# Patient Record
Sex: MALE | Race: BLACK OR AFRICAN AMERICAN | NOT HISPANIC OR LATINO | Employment: FULL TIME | URBAN - METROPOLITAN AREA
[De-identification: names, ages, dates, MRNs, and addresses within clinical notes are randomized per-mention and may not be internally consistent; named-entity substitution may affect disease eponyms.]

---

## 2017-07-27 ENCOUNTER — RADIANT APPOINTMENT (OUTPATIENT)
Dept: GENERAL RADIOLOGY | Facility: CLINIC | Age: 37
End: 2017-07-27
Attending: FAMILY MEDICINE
Payer: COMMERCIAL

## 2017-07-27 ENCOUNTER — OFFICE VISIT (OUTPATIENT)
Dept: FAMILY MEDICINE | Facility: CLINIC | Age: 37
End: 2017-07-27
Payer: COMMERCIAL

## 2017-07-27 VITALS
OXYGEN SATURATION: 98 % | SYSTOLIC BLOOD PRESSURE: 139 MMHG | WEIGHT: 195.7 LBS | BODY MASS INDEX: 26.51 KG/M2 | HEART RATE: 73 BPM | HEIGHT: 72 IN | TEMPERATURE: 99 F | DIASTOLIC BLOOD PRESSURE: 83 MMHG

## 2017-07-27 DIAGNOSIS — S69.92XA INJURY OF HAND, LEFT, INITIAL ENCOUNTER: ICD-10-CM

## 2017-07-27 DIAGNOSIS — Z00.00 ROUTINE HISTORY AND PHYSICAL EXAMINATION OF ADULT: Primary | ICD-10-CM

## 2017-07-27 DIAGNOSIS — Z13.220 LIPID SCREENING: ICD-10-CM

## 2017-07-27 DIAGNOSIS — Z13.1 SCREENING FOR DIABETES MELLITUS: ICD-10-CM

## 2017-07-27 PROCEDURE — 36415 COLL VENOUS BLD VENIPUNCTURE: CPT | Performed by: FAMILY MEDICINE

## 2017-07-27 PROCEDURE — 80061 LIPID PANEL: CPT | Performed by: FAMILY MEDICINE

## 2017-07-27 PROCEDURE — 82947 ASSAY GLUCOSE BLOOD QUANT: CPT | Performed by: FAMILY MEDICINE

## 2017-07-27 PROCEDURE — 99213 OFFICE O/P EST LOW 20 MIN: CPT | Mod: 25 | Performed by: FAMILY MEDICINE

## 2017-07-27 PROCEDURE — 99395 PREV VISIT EST AGE 18-39: CPT | Performed by: FAMILY MEDICINE

## 2017-07-27 PROCEDURE — 73130 X-RAY EXAM OF HAND: CPT | Mod: LT

## 2017-07-27 NOTE — LETTER
Yobani Baldwin  [address]  [facility] [address]      Dear Yobani    It was nice to see you at your recent visit.  Enclosed are the lab results taken at that time.  Resulted Orders   Lipid panel reflex to direct LDL   Result Value Ref Range    Cholesterol 178 <200 mg/dL    Triglycerides 159 (H) <150 mg/dL      Comment:      Borderline high:  150-199 mg/dl   High:             200-499 mg/dl   Very high:       >499 mg/dl      HDL Cholesterol 38 (L) >39 mg/dL    LDL Cholesterol Calculated 108 (H) <100 mg/dL      Comment:      Above desirable:  100-129 mg/dl   Borderline High:  130-159 mg/dL   High:             160-189 mg/dL   Very high:       >189 mg/dl      Non HDL Cholesterol 140 (H) <130 mg/dL      Comment:      Above Desirable:  130-159 mg/dl   Borderline high:  160-189 mg/dl   High:             190-219 mg/dl   Very high:       >219 mg/dl     Glucose   Result Value Ref Range    Glucose 82 70 - 99 mg/dL       Your test results are basically the same as your previous ones, slightly high cholesterol.  Follow up as needed or in 1 year.    Sincerely,    Jaciel Wilde MD MPH

## 2017-07-27 NOTE — LETTER
[facility]  [address]  [facility] [address]  Phone: [phone]    July 27, 2017        Yobani MICKY Baldwin  [address]  [facility] [address]          To whom it may concern:    RE: Green Lakedustin Baldwin    Patient was seen and treated today at our clinic.    Please contact me for questions or concerns.      Sincerely,        Jaciel Wilde MD

## 2017-07-27 NOTE — LETTER
July 27, 2017    Yobani Baldwin  [address]  [facility] [address]      Dear Yobani Hilton is under my professional care and was seen in clinic today, July 27, 2017.    Please call my office if you have any questions or concerns.      Sincerely,        Jaciel Wilde MD

## 2017-07-27 NOTE — NURSING NOTE
"Chief Complaint   Patient presents with     Physical     /83  Pulse 73  Temp 99  F (37.2  C) (Oral)  Ht 5' 11.5\" (1.816 m)  Wt 195 lb 11.2 oz (88.8 kg)  SpO2 98%  BMI 26.91 kg/m2 Estimated body mass index is 26.91 kg/(m^2) as calculated from the following:    Height as of this encounter: 5' 11.5\" (1.816 m).    Weight as of this encounter: 195 lb 11.2 oz (88.8 kg).  BP completed using cuff size: regular       Health Maintenance due pending provider review:  PHQ9    PHQ/ACT/JUAN LUIS--Gave pt questionnare      Iraida Jones CMA  "

## 2017-07-27 NOTE — MR AVS SNAPSHOT
After Visit Summary   7/27/2017    Yobani Baldwin    MRN: [id number]           Patient Information     Date Of Birth          1980        Visit Information        Provider Department      7/27/2017 3:00 PM Jaciel Wilde MD [facility]        Today's Diagnoses     Routine history and physical examination of adult    -  1    Injury of hand, left, initial encounter        Screening for diabetes mellitus        Lipid screening          Care Instructions      Preventive Health Recommendations  Male Ages 26 - 39    Yearly exam:             See your health care provider every year in order to  o   Review health changes.   o   Discuss preventive care.    o   Review your medicines if your doctor has prescribed any.    You should be tested each year for STDs (sexually transmitted diseases), if you re at risk.     After age 35, talk to your provider about cholesterol testing. If you are at risk for heart disease, have your cholesterol tested at least every 5 years.     If you are at risk for diabetes, you should have a diabetes test (fasting glucose).  Shots: Get a flu shot each year. Get a tetanus shot every 10 years.     Nutrition:    Eat at least 5 servings of fruits and vegetables daily.     Eat whole-grain bread, whole-wheat pasta and brown rice instead of white grains and rice.     Talk to your provider about Calcium and Vitamin D.     Lifestyle    Exercise for at least 150 minutes a week (30 minutes a day, 5 days a week). This will help you control your weight and prevent disease.     Limit alcohol to one drink per day.     No smoking.     Wear sunscreen to prevent skin cancer.     See your dentist every six months for an exam and cleaning.             Follow-ups after your visit        Follow-up notes from your care team     Return in about 1 year (around 7/27/2018), or if symptoms worsen or fail to improve.      Who to contact     If you have questions or need follow up  "information about today's clinic visit or your schedule please contact [facility] directly at [phone].  Normal or non-critical lab and imaging results will be communicated to you by Nyxoahhart, letter or phone within 4 business days after the clinic has received the results. If you do not hear from us within 7 days, please contact the clinic through Nyxoahhart or phone. If you have a critical or abnormal lab result, we will notify you by phone as soon as possible.  Submit refill requests through Chatterfly or call your pharmacy and they will forward the refill request to us. Please allow 3 business days for your refill to be completed.          Additional Information About Your Visit        [employer] Information     Chatterfly lets you send messages to your doctor, view your test results, renew your prescriptions, schedule appointments and more. To sign up, go to [email or web address] . Click on \"Log in\" on the left side of the screen, which will take you to the Welcome page. Then click on \"Sign up Now\" on the right side of the page.     You will be asked to enter the access code listed below, as well as some personal information. Please follow the directions to create your username and password.     Your access code is: [email or web address]  Expires: 10/29/2017 10:31 AM     Your access code will  in 90 days. If you need help or a new code, please call your [place] clinic or [phone].        Care EveryWhere ID     This is your Care EveryWhere ID. This could be used by other organizations to access your [place] medical records  [id number]        Your Vitals Were     Pulse Temperature Height Pulse Oximetry BMI (Body Mass Index)       73 99  F (37.2  C) (Oral) 5' 11.5\" (1.816 m) 98% 26.91 kg/m2        Blood Pressure from Last 3 Encounters:   17 139/83   10/17/16 139/80   16 120/80    Weight from Last 3 Encounters:   17 195 lb 11.2 oz (88.8 kg)   10/17/16 208 lb (94.3 kg)   16 199 lb " (90.3 kg)              We Performed the Following     Glucose     Lipid panel reflex to direct LDL     OFFICE/OUTPT VISIT,PREM LYLES III        Primary Care Provider Office Phone # Fax #    Jaciel Todd Wilde [phone]       [facility] [address] [facility] [address]        Equal Access to Services     [facility]: Hadii aad ku hadasho Soomaali, waaxda luqadaha, qaybta kaalmada adeegyada, waxay idiin hayaan adeeg kharash la'aakaitlin . So [facility] [phone].    ATENCIÓN: Si habla español, tiene a watson disposición servicios gratuitos de asistencia lingüística. Rickame al [phone].    We comply with applicable federal civil rights laws and [place] laws. We do not discriminate on the basis of race, color, national origin, age, disability sex, sexual orientation or gender identity.            Thank you!     Thank you for choosing [facility]  for your care. Our goal is always to provide you with excellent care. Hearing back from our patients is one way we can continue to improve our services. Please take a few minutes to complete the written survey that you may receive in the mail after your visit with us. Thank you!             Your Updated Medication List - Protect others around you: Learn how to safely use, store and throw away your medicines at www.disposemymeds.org.          This list is accurate as of: 7/27/17 11:59 PM.  Always use your most recent med list.                   Brand Name Dispense Instructions for use Diagnosis    NO ACTIVE MEDICATIONS           sildenafil 100 MG cap/tab    VIAGRA    6 tablet    Take 0.5-1 tablets ( mg) by mouth daily as needed for erectile dysfunction Take 30 min to 4 hours before intercourse.    Drug-induced erectile dysfunction

## 2017-07-27 NOTE — PROGRESS NOTES
SUBJECTIVE:   CC: Yobani Baldwin is an 37 year old male who presents for preventative health visit.     Healthy Habits:    Do you get at least three servings of calcium containing foods daily (dairy, green leafy vegetables, etc.)? No    Amount of exercise or daily activities, outside of work: 3-4 day(s) per week    Problems taking medications regularly No    Medication side effects: No    Have you had an eye exam in the past two years? no    Do you see a dentist twice per year? yes    Do you have sleep apnea, excessive snoring or daytime drowsiness?no     additional concerns     - left hand ring finger/ palm -  Over month ago - sx sore, hard to , tender to touch    Today's PHQ-2 Score:   PHQ-2 ( 1999 Pf[employer]) 7/27/2017 12/10/2015   Q1: Little interest or pleasure in doing things 0 0   Q2: Feeling down, depressed or hopeless 0 2   PHQ-2 Score 0 2         Abuse: Current or Past(Physical, Sexual or Emotional)- No  Do you feel safe in your environment - Yes  Social History   Substance Use Topics     Smoking status: Current Some Day Smoker     Smokeless tobacco: Never Used      Comment: sociallly     Alcohol use 0.0 oz/week     0 Standard drinks or equivalent per week      Comment: socially     The patient does not drink >3 drinks per day nor >7 drinks per week.    Last PSA: No results found for: PSA    Reviewed orders with patient. Reviewed health maintenance and updated orders accordingly - Yes    Reviewed and updated as needed this visit by clinical staff  Tobacco  Allergies  Meds  Med Hx  Surg Hx  Fam Hx  Soc Hx        Reviewed and updated as needed this visit by Provider          STD screening:  History   Sexual Activity     Sexual activity: Yes     Partners: Female     no screening desired today    in addition to health maintanance patient would like to discuss the following problem:    Also having some pain in his hand.  Worse with activity  1 month of pain in his had  Hard to  things    Work  related: maybe, was using a sledge hammer for knocking down a wall    Prob pert ROS:  Neuro: no significant localized weakness, numbness, tingling.    Prob pert exam .  Normal R & L upper extremity joints for ROM symmetry & tone/ no tenderness/ no effusions/ no crepitus/ or deformities.  Except:  Hand exam - some tenderness of the metacarpals of his left hand  Neurological exam reveals normal without focal findings. DTR's, motor strength and sensation normal.    X-ray  No sign of fracture    Findings c/w soft tissue injury or bone bruise    Patient was given instruction on use of ice, rest, exercises and NSAIDs for pain and swelling.  Call or return to clinic as needed if these symptoms worsen or fail to improve as anticipated.          ROS: As per HPI.  Constitutional: no recent illness, no fevers/sweats/chills, sleep normal  Eyes: No vision changes or eye irritation  Ears/Nose/Throat: No runny nose, sore throat or ear pain  CV: no palpitations, no chest pain, no lower extremity swelling.  Resp: no shortness of breath, wheezing, or cough.  GI: no nausea/vomiting/diarrhea, normal stooling pattern, no reflux symptoms, no black or bloody stools  : no burning or urgency with urination, no blood in urine, no sores or discharge.  Skin: no changing moles or other lesions, no rash  Musculoskeletal: As noted above  Psych: no depression, no concerns about anxiety  Neuro: no new headaches, dizziness    I have reviewed and updated the patient's past medical, social and family history in the EMR. Current problems are:  Patient Active Problem List    Diagnosis Date Noted     Major depression in remission (H) 01/14/2016     Priority: Medium     Olecranon bursitis 04/23/2011     Priority: Medium     CARDIOVASCULAR SCREENING; LDL GOAL LESS THAN 160 10/31/2010     Priority: Medium     Allergic rhinitis 05/19/2005     Priority: Medium     Problem list name updated by automated process. Provider to review       Family Hx:  Family  History   Problem Relation Age of Onset     Family History Negative Father      MENTAL ILLNESS Mother      DIABETES No family hx of      Coronary Artery Disease No family hx of      Hypertension No family hx of      Hyperlipidemia No family hx of      CEREBROVASCULAR DISEASE No family hx of      Breast Cancer No family hx of      Colon Cancer No family hx of      Prostate Cancer No family hx of      Other Cancer No family hx of      Depression No family hx of      Anxiety Disorder No family hx of      Substance Abuse No family hx of      Anesthesia Reaction No family hx of      Asthma No family hx of      OSTEOPOROSIS No family hx of      Genetic Disorder No family hx of      Thyroid Disease No family hx of      Obesity No family hx of      Unknown/Adopted No family hx of      Social History:  Social History   Substance Use Topics     Smoking status: Current Some Day Smoker     Smokeless tobacco: Never Used      Comment: sociallly     Alcohol use 0.0 oz/week     0 Standard drinks or equivalent per week      Comment: socially     Social History     Social History Narrative    Social Documentation:        Balanced Diet: YES    Calcium intake: 1-2 per day    Caffeine: 1-2 per day    Exercise:  type of activity na;  na times per week    Sunscreen: No    Seatbelts:  Yes    Self Breast Exam:  na    Self Testicular Exam: No    Physical/Emotional/Sexual Abuse: No    Do you feel safe in your environment? Yes        Cholesterol screen up to date: can do today    Eye Exam up to date: No    Dental Exam up to date: just due    Pap smear up to date: Does Not Apply    Mammogram up to date: Does Not Apply    Dexa Scan up to date: Does Not Apply    Colonoscopy up to date: Does Not Apply    Immunizations up to date: Yes    Glucose screen if over 40:  na                     Allergies:   No Known Allergies   Current Medications:  Current Outpatient Prescriptions   Medication Sig Dispense Refill     sildenafil (VIAGRA) 100 MG tablet  "Take 0.5-1 tablets ( mg) by mouth daily as needed for erectile dysfunction Take 30 min to 4 hours before intercourse. 6 tablet 1     NO ACTIVE MEDICATIONS           Objective:  /83  Pulse 73  Temp 99  F (37.2  C) (Oral)  Ht 5' 11.5\" (1.816 m)  Wt 195 lb 11.2 oz (88.8 kg)  SpO2 98%  BMI 26.91 kg/m2  General Appearance: Pleasant, alert, WN/WD in no acute respiratory distress.  Head Exam: Normal. Normocephalic, atraumatic. No sinus tenderness.  Eye Exam: Normal external eye, conjunctiva, lids. DUANE.  Ear Exam: Normal auditory canals and external ears. Non-tender.  Left TM-normal. Right TM-normal.  OroPharynx Exam: Dental hygiene adequate. Normal buccal mucosa. Normal pharynx.  Neck Exam: Supple, no masses or enlarged, tender nodes.  Thyroid Exam: No nodules or enlargement or tenderness.  Chest/Respiratory Exam: Normal, comfortable, easy respirations. Chest wall normal. Lungs are clear to auscultation. No wheezing, crackles, or rhonchi.  Cardiovascular Exam: Regular rate and rhythm. No murmur, gallop, or rubs. No pedal edema.  Gastrointestinal Exam: Soft, non-tender, no masses or organomegaly.  Musculoskeletal Exam: Back is non-tender, full ROM of upper and lower extremities.  Hand exam as noted above  Skin: no rash, warm and dry.  Neurologic Exam: Nonfocal, no tremor. Normal gait.  Psychiatric Exam: Alert - appropriate, normal affect    ASSESSMENT/PLAN:    ICD-10-CM    1. Routine history and physical examination of adult Z00.00    2. Injury of hand, left, initial encounter S69.92XA XR Hand Left G/E 3 Views   3. Screening for diabetes mellitus Z13.1 Glucose   4. Lipid screening Z13.220 Lipid panel reflex to direct LDL       COUNSELING:  Reviewed preventive health counseling, as reflected in patient instructions       Regular exercise       Healthy diet/nutrition       Safe sex practices/STD prevention       Colon cancer screening       Prostate cancer screening    BP Screening:   Last 3 BP Readings:  " "  BP Readings from Last 3 Encounters:   07/27/17 139/83   10/17/16 139/80   05/27/16 120/80       The following was recommended to the patient:  Re-screen BP within a year and recommended lifestyle modifications       reports that he has been smoking.  He has never used smokeless tobacco.    Estimated body mass index is 28.61 kg/(m^2) as calculated from the following:    Height as of 10/17/16: 5' 11.5\" (1.816 m).    Weight as of 10/17/16: 208 lb (94.3 kg).         Counseling Resources:  ATP IV Guidelines  Pooled Cohorts Equation Calculator  FRAX Risk Assessment  ICSI Preventive Guidelines  Dietary Guidelines for Americans, 2010  USDA's MyPlate  ASA Prophylaxis  Lung CA Screening    Jaciel Wilde MD  [facility]  "

## 2017-07-28 LAB
CHOLEST SERPL-MCNC: 178 MG/DL
GLUCOSE SERPL-MCNC: 82 MG/DL (ref 70–99)
HDLC SERPL-MCNC: 38 MG/DL
LDLC SERPL CALC-MCNC: 108 MG/DL
NONHDLC SERPL-MCNC: 140 MG/DL
TRIGL SERPL-MCNC: 159 MG/DL

## 2017-07-28 ASSESSMENT — PATIENT HEALTH QUESTIONNAIRE - PHQ9: SUM OF ALL RESPONSES TO PHQ QUESTIONS 1-9: 3

## 2017-11-24 ENCOUNTER — OFFICE VISIT (OUTPATIENT)
Dept: INTERNAL MEDICINE | Facility: CLINIC | Age: 37
End: 2017-11-24
Payer: COMMERCIAL

## 2017-11-24 VITALS
SYSTOLIC BLOOD PRESSURE: 128 MMHG | OXYGEN SATURATION: 100 % | DIASTOLIC BLOOD PRESSURE: 64 MMHG | HEART RATE: 68 BPM | BODY MASS INDEX: 27.6 KG/M2 | TEMPERATURE: 98.1 F | WEIGHT: 200.69 LBS

## 2017-11-24 DIAGNOSIS — Z30.09 ENCOUNTER FOR VASECTOMY COUNSELING: Primary | ICD-10-CM

## 2017-11-24 PROCEDURE — 99212 OFFICE O/P EST SF 10 MIN: CPT | Performed by: INTERNAL MEDICINE

## 2017-11-24 NOTE — MR AVS SNAPSHOT
After Visit Summary   11/24/2017    Yobani Baldwin    MRN: [id number]           Patient Information     Date Of Birth          1980        Visit Information        Provider Department      11/24/2017 3:20 PM Lucas Dhaliwal MD [facility]        Today's Diagnoses     Encounter for vasectomy counseling    -  1      Care Instructions    Make an appointment at your convenience for a vasectomy.     N: [employer], [facility].   --[place] [phone]   [email or web address]  --[place] [phone]   [email or web address]  --[place] [phone]   [email or web address]    [facility]: [facility] [phone]   [email or web address]/    FM[facility]: [facility] [phone]   [email or web address]    FM[facility]: [facility] [phone]   [email or web address]              Follow-ups after your visit        Additional Services     UROLOGY ADULT REFERRAL       Your provider has referred you to:     [facility]: [employer], [employer].   --[place] [phone]   [email or web address]  --[place] [phone]   [email or web address]  --[place] [phone]   [email or web address]    FM[facility]: [facility] [phone]   [email or web address]/[email or web address]/    FMG: [facility] [phone]   [email or web address]    FMG: [facility] [phone]   [email or web address]        Please be aware that coverage of these services is subject to the terms and limitations of your health insurance plan.  Call member services at your health plan with any benefit or coverage questions.      Please bring the following with you to your appointment:    (1) Any X-Rays, CTs or MRIs which have been  "performed.  Contact the facility where they were done to arrange for  prior to your scheduled appointment.    (2) List of current medications  (3) This referral request   (4) Any documents/labs given to you for this referral                  Who to contact     If you have questions or need follow up information about today's clinic visit or your schedule please contact [facility] directly at [phone].  Normal or non-critical lab and imaging results will be communicated to you by MegaBitshart, letter or phone within 4 business days after the clinic has received the results. If you do not hear from us within 7 days, please contact the clinic through MegaBitshart or phone. If you have a critical or abnormal lab result, we will notify you by phone as soon as possible.  Submit refill requests through [employer]rix or call your pharmacy and they will forward the refill request to us. Please allow 3 business days for your refill to be completed.          Additional Information About Your Visit        [employer] Information     Sentrix lets you send messages to your doctor, view your test results, renew your prescriptions, schedule appointments and more. To sign up, go to [email or web address] . Click on \"Log in\" on the left side of the screen, which will take you to the Welcome page. Then click on \"Sign up Now\" on the right side of the page.     You will be asked to enter the access code listed below, as well as some personal information. Please follow the directions to create your username and password.     Your access code is: [email or web address]  Expires: 2018  4:13 PM     Your access code will  in 90 days. If you need help or a new code, please call your [place] clinic or [phone].        Care EveryWhere ID     This is your Care EveryWhere ID. This could be used by other organizations to access your [place] medical records  [id number]        Your Vitals Were     Pulse Temperature Pulse " Oximetry BMI (Body Mass Index)          68 98.1  F (36.7  C) (Oral) 100% 27.6 kg/m2         Blood Pressure from Last 3 Encounters:   11/24/17 128/64   07/27/17 139/83   10/17/16 139/80    Weight from Last 3 Encounters:   11/24/17 200 lb 11 oz (91 kg)   07/27/17 195 lb 11.2 oz (88.8 kg)   10/17/16 208 lb (94.3 kg)              We Performed the Following     UROLOGY ADULT REFERRAL        Primary Care Provider Office Phone # Fax #    Jaciel Todd Wilde [phone] [address] [facility] [address]        Equal Access to Services     KIKI WIGGINS : Hadii elke baileyo [place], waaxda luqadaha, qaybta kaalmada adeegyada, liam mitchell . So [facility] [phone].    ATENCIÓN: Si habla español, tiene a watson disposición servicios gratuitos de asistencia lingüística. Llame al [phone].    We comply with applicable federal civil rights laws and [place] laws. We do not discriminate on the basis of race, color, national origin, age, disability, sex, sexual orientation, or gender identity.            Thank you!     Thank you for choosing [facility]  for your care. Our goal is always to provide you with excellent care. Hearing back from our patients is one way we can continue to improve our services. Please take a few minutes to complete the written survey that you may receive in the mail after your visit with us. Thank you!             Your Updated Medication List - Protect others around you: Learn how to safely use, store and throw away your medicines at www.disposemymeds.org.          This list is accurate as of: 11/24/17  4:13 PM.  Always use your most recent med list.                   Brand Name Dispense Instructions for use Diagnosis    NO ACTIVE MEDICATIONS           sildenafil 100 MG tablet    VIAGRA    6 tablet    Take 0.5-1 tablets ( mg) by mouth daily as needed for erectile dysfunction Take 30 min to 4 hours before  intercourse.    Drug-induced erectile dysfunction

## 2017-11-24 NOTE — PROGRESS NOTES
SUBJECTIVE:   Yobani Baldwin is a 37 year old male who presents to clinic today for the following health issues:      Wants referral for vasectomy.   Denies  problems or sx.         Problem list and histories reviewed & adjusted, as indicated.  Additional history: as documented        Reviewed and updated as needed this visit by clinical staff  Tobacco  Allergies  Meds  Problems  Med Hx  Surg Hx  Fam Hx  Soc Hx        Reviewed and updated as needed this visit by Provider  Allergies  Meds  Problems           Past Medical History:  ---------------------------  History reviewed. No pertinent past medical history.    Past Surgical History:  ---------------------------  History reviewed. No pertinent surgical history.    Current Medications:  ---------------------------  Current Outpatient Prescriptions   Medication Sig Dispense Refill     sildenafil (VIAGRA) 100 MG tablet Take 0.5-1 tablets ( mg) by mouth daily as needed for erectile dysfunction Take 30 min to 4 hours before intercourse. (Patient not taking: Reported on 11/24/2017) 6 tablet 1     NO ACTIVE MEDICATIONS          Allergies:  -------------  No Known Allergies    Social History:  -------------------  Social History     Social History     Marital status:      Spouse name: N/A     Number of children: 2     Years of education: N/A     Occupational History     Not on file.     Social History Main Topics     Smoking status: Current Some Day Smoker     Smokeless tobacco: Never Used      Comment: sociallly     Alcohol use 0.0 oz/week     0 Standard drinks or equivalent per week      Comment: socially     Drug use: No     Sexual activity: Yes     Partners: Female     Other Topics Concern     Parent/Sibling W/ Cabg, Mi Or Angioplasty Before 65f 55m? No     Social History Narrative    Social Documentation:        Balanced Diet: YES    Calcium intake: 1-2 per day    Caffeine: 1-2 per day    Exercise:  type of activity na;  na times per  week    Sunscreen: No    Seatbelts:  Yes    Self Breast Exam:  na    Self Testicular Exam: No    Physical/Emotional/Sexual Abuse: No    Do you feel safe in your environment? Yes        Cholesterol screen up to date: can do today    Eye Exam up to date: No    Dental Exam up to date: just due    Pap smear up to date: Does Not Apply    Mammogram up to date: Does Not Apply    Dexa Scan up to date: Does Not Apply    Colonoscopy up to date: Does Not Apply    Immunizations up to date: Yes    Glucose screen if over 40:  na                       Family Medical History:  ------------------------------  Family History   Problem Relation Age of Onset     Family History Negative Father      MENTAL ILLNESS Mother      DIABETES No family hx of      Coronary Artery Disease No family hx of      Hypertension No family hx of      Hyperlipidemia No family hx of      CEREBROVASCULAR DISEASE No family hx of      Breast Cancer No family hx of      Colon Cancer No family hx of      Prostate Cancer No family hx of      Other Cancer No family hx of      Depression No family hx of      Anxiety Disorder No family hx of      Substance Abuse No family hx of      Anesthesia Reaction No family hx of      Asthma No family hx of      OSTEOPOROSIS No family hx of      Genetic Disorder No family hx of      Thyroid Disease No family hx of      Obesity No family hx of      Unknown/Adopted No family hx of          ROS:  REVIEW OF SYSTEMS:    RESP: negative for cough, dyspnea, wheezing, hemoptysis  CV: negative for chest pain, palpitations, PND, TELLES, orthopnea; reports no changes in their ability to perform physical activity (from cardiovascular standpoint)  GI: negative for dysphagia, N/V, pain, melena, diarrhea and constipation  NEURO: negative for numbness/tingling, paralysis, incoordination, or focal weakness     OBJECTIVE:                                                    /64  Pulse 68  Temp 98.1  F (36.7  C) (Oral)  Wt 200 lb 11 oz (91 kg)   SpO2 100%  BMI 27.6 kg/m2     Exam deffered.          ASSESSMENT/PLAN:                                                      (Z30.09) Encounter for vasectomy counseling  (primary encounter diagnosis)  Comment: wants to have vasectomy.   Refer to Urology   Answered basic questions about vaectomies.   Plan: UROLOGY ADULT REFERRAL              See Patient Instructions    ERWIN SNEED M.D., MD  [facility]

## 2018-07-20 ENCOUNTER — OFFICE VISIT (OUTPATIENT)
Dept: FAMILY MEDICINE | Facility: CLINIC | Age: 38
End: 2018-07-20
Payer: COMMERCIAL

## 2018-07-20 VITALS
TEMPERATURE: 97.5 F | HEIGHT: 72 IN | DIASTOLIC BLOOD PRESSURE: 84 MMHG | OXYGEN SATURATION: 97 % | SYSTOLIC BLOOD PRESSURE: 140 MMHG | HEART RATE: 72 BPM | BODY MASS INDEX: 27.32 KG/M2 | WEIGHT: 201.7 LBS

## 2018-07-20 DIAGNOSIS — N41.0 ACUTE PROSTATITIS: Primary | ICD-10-CM

## 2018-07-20 PROCEDURE — 99214 OFFICE O/P EST MOD 30 MIN: CPT | Performed by: FAMILY MEDICINE

## 2018-07-20 RX ORDER — CIPROFLOXACIN 500 MG/1
500 TABLET, FILM COATED ORAL 2 TIMES DAILY
Qty: 56 TABLET | Refills: 0 | Status: SHIPPED | OUTPATIENT
Start: 2018-07-20 | End: 2018-08-17

## 2018-07-20 NOTE — MR AVS SNAPSHOT
"              After Visit Summary   7/20/2018    Yobani Baldwin    MRN: [id number]           Patient Information     Date Of Birth          1980        Visit Information        Provider Department      7/20/2018 12:30 PM Jaciel Wilde MD [facility]        Today's Diagnoses     Acute prostatitis    -  1       Follow-ups after your visit        Follow-up notes from your care team     Return if symptoms worsen or fail to improve.      Who to contact     If you have questions or need follow up information about today's clinic visit or your schedule please contact [facility] directly at [phone].  Normal or non-critical lab and imaging results will be communicated to you by MyChart, letter or phone within 4 business days after the clinic has received the results. If you do not hear from us within 7 days, please contact the clinic through MyChart or phone. If you have a critical or abnormal lab result, we will notify you by phone as soon as possible.  Submit refill requests through [employer] or call your pharmacy and they will forward the refill request to us. Please allow 3 business days for your refill to be completed.          Additional Information About Your Visit        Care EveryWhere ID     This is your Care EveryWhere ID. This could be used by other organizations to access your [place] medical records  [id number]        Your Vitals Were     Pulse Temperature Height Pulse Oximetry BMI (Body Mass Index)       72 97.5  F (36.4  C) (Oral) 5' 11.5\" (1.816 m) 97% 27.74 kg/m2        Blood Pressure from Last 3 Encounters:   07/20/18 140/84   11/24/17 128/64   07/27/17 139/83    Weight from Last 3 Encounters:   07/20/18 201 lb 11.2 oz (91.5 kg)   11/24/17 200 lb 11 oz (91 kg)   07/27/17 195 lb 11.2 oz (88.8 kg)              Today, you had the following     No orders found for display         Today's Medication Changes          These changes are accurate as of 7/20/18  " 4:26 PM.  If you have any questions, ask your nurse or doctor.               Start taking these medicines.        Dose/Directions    ciprofloxacin 500 MG tablet   Commonly known as:  CIPRO   Used for:  Acute prostatitis   Started by:  Jaciel Wilde MD        Dose:  500 mg   Take 1 tablet (500 mg) by mouth 2 times daily for 28 days   Quantity:  56 tablet   Refills:  0            Where to get your medicines      These medications were sent to [facility] [place] [address]  [address] [place] [address], [facility] [address]     Phone:  [phone]     ciprofloxacin 500 MG tablet                Primary Care Provider Office Phone # Fax #    Jaciel Wilde [phone] [address] [facility] [address]        Equal Access to Services     KIKI WIGGINS AH: Hadii aad ku hadasho Soomaali, waaxda luqadaha, qaybta kaalmada adeegyada, waxay apoorva hayrtent marcelino. So [facility] [phone].    ATENCIÓN: Si habla español, tiene a watson disposición servicios gratuitos de asistencia lingüística. [facility] [phone].    We comply with applicable federal civil rights laws and [place] laws. We do not discriminate on the basis of race, color, national origin, age, disability, sex, sexual orientation, or gender identity.            Thank you!     Thank you for choosing [facility]  for your care. Our goal is always to provide you with excellent care. Hearing back from our patients is one way we can continue to improve our services. Please take a few minutes to complete the written survey that you may receive in the mail after your visit with us. Thank you!             Your Updated Medication List - Protect others around you: Learn how to safely use, store and throw away your medicines at www.disposemymeds.org.          This list is accurate as of 7/20/18  4:26 PM.  Always use your most recent med list.                   Brand Name Dispense  Instructions for use Diagnosis    ciprofloxacin 500 MG tablet    CIPRO    56 tablet    Take 1 tablet (500 mg) by mouth 2 times daily for 28 days    Acute prostatitis       NO ACTIVE MEDICATIONS           sildenafil 100 MG tablet    VIAGRA    6 tablet    Take 0.5-1 tablets ( mg) by mouth daily as needed for erectile dysfunction Take 30 min to 4 hours before intercourse.    Drug-induced erectile dysfunction

## 2018-07-20 NOTE — PROGRESS NOTES
SUBJECTIVE:   Yobani Baldwin is a 38 year old male who presents to clinic today for the following health issues:      Back Pain       Duration: x1 week - happened during intercourse         Specific cause: none    Description:   Location of pain: low back left  Character of pain: dull ache, cramping and throbbing   Pain radiation: radiating into pelvic/groin and testicles - tugging/pulling feeling   New numbness or weakness in legs, not attributed to pain:  no     Intensity: Currently 5/10, At its worst 8-9/10    History:   Pain interferes with job: No  History of back problems: no prior back problems  Any previous MRI or X-rays: None  Sees a specialist for back pain:  No  Therapies tried without relief: ibuprofen     Alleviating factors:   Improved by: has only tried ibuprofen       Precipitating factors:  Worsened by: Standing and Sitting    Functional and Psychosocial Screen (Minor STarT Back):      Not performed today    Vasectomy June 1st and he wonders if it is related      ROS: As per HPI.  Constitutional:  no fevers/sweats/chills, But overall malaise  : no burning or urgency with urination  Skin: no rash      I have reviewed and updated the patient's past medical history in the EMR. Current problems are:    Patient Active Problem List   Diagnosis     Allergic rhinitis     CARDIOVASCULAR SCREENING; LDL GOAL LESS THAN 160     Olecranon bursitis     Major depression in remission (H)     No past surgical history on file.    Social History   Substance Use Topics     Smoking status: Current Some Day Smoker     Smokeless tobacco: Never Used      Comment: sociallly     Alcohol use 0.0 oz/week     0 Standard drinks or equivalent per week      Comment: socially     Family History   Problem Relation Age of Onset     Family History Negative Father      Mental Illness Mother      Diabetes No family hx of      Coronary Artery Disease No family hx of      Hypertension No family hx of      Hyperlipidemia No family  "hx of      Cerebrovascular Disease No family hx of      Breast Cancer No family hx of      Colon Cancer No family hx of      Prostate Cancer No family hx of      Other Cancer No family hx of      Depression No family hx of      Anxiety Disorder No family hx of      Substance Abuse No family hx of      Anesthesia Reaction No family hx of      Asthma No family hx of      Osteoperosis No family hx of      Genetic Disorder No family hx of      Thyroid Disease No family hx of      Obesity No family hx of      Unknown/Adopted No family hx of          Allergies, reviewed:   No Known Allergies    Current Outpatient Prescriptions   Medication Sig Dispense Refill     ciprofloxacin (CIPRO) 500 MG tablet Take 1 tablet (500 mg) by mouth 2 times daily for 28 days 56 tablet 0     NO ACTIVE MEDICATIONS        sildenafil (VIAGRA) 100 MG tablet Take 0.5-1 tablets ( mg) by mouth daily as needed for erectile dysfunction Take 30 min to 4 hours before intercourse. (Patient not taking: Reported on 11/24/2017) 6 tablet 1       Objective:  /84  Pulse 72  Temp 97.5  F (36.4  C) (Oral)  Ht 5' 11.5\" (1.816 m)  Wt 201 lb 11.2 oz (91.5 kg)  SpO2 97%  BMI 27.74 kg/m2  General Appearance: Pleasant, alert, WN/WD in no acute respiratory distress.  Gastrointestinal Exam: Soft, non-tender, no masses or organomegaly.  Musculoskeletal Exam: Back is non-tender, full ROM of upper and lower extremities.  Skin: no rash, warm and dry.  Gyn: Penis and testicles are normal, area around the vas deferens is not significantly tender but the perineal area is tender particularly with deep palpation  Neurologic Exam: Nonfocal, no tremor. Normal gait.  Psychiatric Exam: Alert - appropriate, normal affect    ASSESSMENT/PLAN:    ICD-10-CM    1. Acute prostatitis N41.0 ciprofloxacin (CIPRO) 500 MG tablet      Based on exam patient has acute prostatitis  I do not think this is necessarily related to his recent vasectomy  Discussed the use of " fluoroquinolones  Follow-up with urology if this does not get better within a few days    He has a relationship with urologist after his vasectomy    Jaciel Wilde MD MPH

## 2018-07-20 NOTE — NURSING NOTE
"Chief Complaint   Patient presents with     Back Pain     /84  Pulse 72  Temp 97.5  F (36.4  C) (Oral)  Ht 5' 11.5\" (1.816 m)  Wt 201 lb 11.2 oz (91.5 kg)  SpO2 97%  BMI 27.74 kg/m2 Estimated body mass index is 27.74 kg/(m^2) as calculated from the following:    Height as of this encounter: 5' 11.5\" (1.816 m).    Weight as of this encounter: 201 lb 11.2 oz (91.5 kg).  Medication Reconciliation: complete      Health Maintenance that is potentially due pending provider review:  PHQ9    Completing PHQ9 today.    GEORGIA Plata  "

## 2018-07-21 ASSESSMENT — PATIENT HEALTH QUESTIONNAIRE - PHQ9: SUM OF ALL RESPONSES TO PHQ QUESTIONS 1-9: 0

## 2019-04-12 ENCOUNTER — OFFICE VISIT (OUTPATIENT)
Dept: FAMILY MEDICINE | Facility: CLINIC | Age: 39
End: 2019-04-12
Payer: COMMERCIAL

## 2019-04-12 VITALS
DIASTOLIC BLOOD PRESSURE: 84 MMHG | RESPIRATION RATE: 18 BRPM | OXYGEN SATURATION: 96 % | SYSTOLIC BLOOD PRESSURE: 134 MMHG | WEIGHT: 203.44 LBS | HEIGHT: 72 IN | HEART RATE: 84 BPM | TEMPERATURE: 98.4 F | BODY MASS INDEX: 27.56 KG/M2

## 2019-04-12 DIAGNOSIS — F32.5 MAJOR DEPRESSION IN REMISSION (H): ICD-10-CM

## 2019-04-12 DIAGNOSIS — M54.50 ACUTE RIGHT-SIDED LOW BACK PAIN WITHOUT SCIATICA: Primary | ICD-10-CM

## 2019-04-12 LAB
ALBUMIN UR-MCNC: NEGATIVE MG/DL
APPEARANCE UR: CLEAR
BILIRUB UR QL STRIP: NEGATIVE
COLOR UR AUTO: YELLOW
GLUCOSE UR STRIP-MCNC: NEGATIVE MG/DL
HGB UR QL STRIP: NEGATIVE
KETONES UR STRIP-MCNC: NEGATIVE MG/DL
LEUKOCYTE ESTERASE UR QL STRIP: NEGATIVE
NITRATE UR QL: NEGATIVE
PH UR STRIP: 6 PH (ref 5–7)
SOURCE: NORMAL
SP GR UR STRIP: 1.02 (ref 1–1.03)
UROBILINOGEN UR STRIP-ACNC: 0.2 EU/DL (ref 0.2–1)

## 2019-04-12 PROCEDURE — 99214 OFFICE O/P EST MOD 30 MIN: CPT | Performed by: FAMILY MEDICINE

## 2019-04-12 PROCEDURE — 81003 URINALYSIS AUTO W/O SCOPE: CPT | Performed by: FAMILY MEDICINE

## 2019-04-12 ASSESSMENT — ANXIETY QUESTIONNAIRES
6. BECOMING EASILY ANNOYED OR IRRITABLE: NOT AT ALL
7. FEELING AFRAID AS IF SOMETHING AWFUL MIGHT HAPPEN: NOT AT ALL
GAD7 TOTAL SCORE: 1
5. BEING SO RESTLESS THAT IT IS HARD TO SIT STILL: NOT AT ALL
IF YOU CHECKED OFF ANY PROBLEMS ON THIS QUESTIONNAIRE, HOW DIFFICULT HAVE THESE PROBLEMS MADE IT FOR YOU TO DO YOUR WORK, TAKE CARE OF THINGS AT HOME, OR GET ALONG WITH OTHER PEOPLE: NOT DIFFICULT AT ALL
2. NOT BEING ABLE TO STOP OR CONTROL WORRYING: NOT AT ALL
3. WORRYING TOO MUCH ABOUT DIFFERENT THINGS: NOT AT ALL
1. FEELING NERVOUS, ANXIOUS, OR ON EDGE: NOT AT ALL

## 2019-04-12 ASSESSMENT — PATIENT HEALTH QUESTIONNAIRE - PHQ9
5. POOR APPETITE OR OVEREATING: SEVERAL DAYS
SUM OF ALL RESPONSES TO PHQ QUESTIONS 1-9: 1

## 2019-04-12 ASSESSMENT — PAIN SCALES - GENERAL: PAINLEVEL: SEVERE PAIN (7)

## 2019-04-12 ASSESSMENT — MIFFLIN-ST. JEOR: SCORE: 1872.85
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
## 2019-04-12 NOTE — PATIENT INSTRUCTIONS
Acute Low Back Pain: Self Care at Home Instructions  Conservative Treatment    Remaining active supports a quicker recovery, keep moving. (ex. Walking, increase time & speed as tolerated).    Bed rest is not recommended. Minimize sitting. Do not remain in one position for extended periods of time.    Maintain routine activity with attention to correct posture; stop aggravating activities.    Over-the-counter pain medications for short term symptom control. (See below)    Muscle relaxants are sometimes helpful for few days, but may cause drowsiness. (See below)    Cold packs or heat based upon your preference.    Most people improve within 2 weeks; most have significant improvement within 4 weeks.    If symptoms worsen or you experience numbness, contact your provider immediately.    Medications for Pain Relief  Recommended over-the-counter non-steroidal anti-inflammatories:     Ibuprofen (Advil, Motrin) 600 mg. three times a day as needed for pain      OR   Naproxen Sodium (Aleve) 220-440 mg. two times per day as needed for pain    If you have been prescribed a muscle relaxant (*cyclobenzapine 10 mg.)  Take    - 1 tablet at bedtime  *May cause drowsiness. Do not drive when taking this medication.    Low Back Pain Exercises   Exercises that stretch and strengthen the muscles of your abdomen and spine can help prevent back problems. If your back and abdominal muscles are strong, you can maintain good posture and keep your spine in its correct position.     If your muscles are tight, take a warm shower or bath before doing the exercises. Exercise on a rug or mat. Stop doing any exercise that causes pain until you have talked with your provider.     These exercises are intended only as suggestions. Ask your provider or physical therapist to help you develop an exercise program. Check with your provider before starting the exercises. Ask your provider how many times a week you need to do the exercises.     Low Back Pain  Exercises                       Cat and camel: Get down on your hands and knees. Let your stomach sag, allowing your back to curve downward. Hold this position for 5 seconds. Then arch your back and hold for 5 seconds. Do 3 sets of 10.       Pelvic tilt: Lie on your back with your knees bent and your feet flat on the floor. Tighten your abdominal muscles and push your lower back into the floor. Hold this position for 5 seconds, then relax. Do 3 sets of 10.       Extension exercise: Lie face down on the floor for 5 minutes. If this hurts too much, lie face down with a pillow under your stomach. This should relieve your leg or back pain. When you can lie on your stomach for 5 minutes without a pillow, then you can continue with the rest of this exercise.     After lying on your stomach for 5 minutes, prop yourself up on your elbows for another 5 minutes. Lie flat again for 1 minute, then press down on your hands and extend your elbows while keeping your hips flat on the floor. Hold for 1 second and lower yourself to the floor. Repeat 10 times. Do 4 sets. Rest for 2 minutes between sets. You should have no pain in your legs when you do this, but it is normal to feel pain in your lower back. Do this several times a day.     Developed by [employer]   Published by [employer].   Last modified: 2009-02-08   Last reviewed: 2008-07-07   This content is reviewed periodically and is subject to change as new health information becomes available. The information is intended to inform and educate and is not a replacement for medical evaluation, advice, diagnosis or treatment by a healthcare professional.   Adult Health Advisor 2009.1 Index  Adult Health Advisor 2009.1 Credits     2009 [facility] and/or its affiliates. All Rights Reserved.

## 2019-04-12 NOTE — NURSING NOTE
"Chief Complaint   Patient presents with     Back Pain     /84 (BP Location: Right arm, Patient Position: Sitting, Cuff Size: Adult Regular)   Pulse 84   Temp 98.4  F (36.9  C) (Oral)   Resp 18   Ht 1.816 m (5' 11.5\")   Wt 92.3 kg (203 lb 7 oz)   SpO2 96%   BMI 27.98 kg/m   Estimated body mass index is 27.98 kg/m  as calculated from the following:    Height as of this encounter: 1.816 m (5' 11.5\").    Weight as of this encounter: 92.3 kg (203 lb 7 oz).  bp completed using cuff size: large      Health Maintenance addressed:  NONE    n/a              "

## 2019-04-13 ASSESSMENT — ANXIETY QUESTIONNAIRES: GAD7 TOTAL SCORE: 1

## 2019-07-08 NOTE — PATIENT INSTRUCTIONS
Make an appointment at your convenience for a vasectomy.     FHN: [facility]   --[place] [phone]   [email or web address]  --[place] [phone]   [email or web address]  --[place] [phone]   [email or web address]    FMG: [facility] [place] [phone]   [email or web address]    FMG: [facility] [place] [phone]   [email or web address]    FMG: [facility] - [place] [phone]   [email or web address]       no

## 2020-05-06 ENCOUNTER — TRANSFERRED RECORDS (OUTPATIENT)
Dept: HEALTH INFORMATION MANAGEMENT | Facility: CLINIC | Age: 40
End: 2020-05-06
Payer: COMMERCIAL

## 2020-10-20 ENCOUNTER — OFFICE VISIT (OUTPATIENT)
Dept: FAMILY MEDICINE | Facility: CLINIC | Age: 40
End: 2020-10-20
Payer: COMMERCIAL

## 2020-10-20 VITALS
DIASTOLIC BLOOD PRESSURE: 77 MMHG | OXYGEN SATURATION: 98 % | HEIGHT: 70 IN | TEMPERATURE: 96.7 F | HEART RATE: 77 BPM | SYSTOLIC BLOOD PRESSURE: 145 MMHG | RESPIRATION RATE: 16 BRPM | BODY MASS INDEX: 27.77 KG/M2 | WEIGHT: 194 LBS

## 2020-10-20 DIAGNOSIS — Z13.220 LIPID SCREENING: ICD-10-CM

## 2020-10-20 DIAGNOSIS — Z23 NEED FOR VACCINATION: ICD-10-CM

## 2020-10-20 DIAGNOSIS — F32.2 MAJOR DEPRESSIVE DISORDER, SINGLE EPISODE, SEVERE WITHOUT PSYCHOTIC FEATURES (H): ICD-10-CM

## 2020-10-20 DIAGNOSIS — Z13.1 DIABETES MELLITUS SCREENING: ICD-10-CM

## 2020-10-20 DIAGNOSIS — Z00.00 ROUTINE GENERAL MEDICAL EXAMINATION AT A HEALTH CARE FACILITY: Primary | ICD-10-CM

## 2020-10-20 PROCEDURE — 80061 LIPID PANEL: CPT | Performed by: FAMILY MEDICINE

## 2020-10-20 PROCEDURE — 99396 PREV VISIT EST AGE 40-64: CPT | Mod: 25 | Performed by: FAMILY MEDICINE

## 2020-10-20 PROCEDURE — 82947 ASSAY GLUCOSE BLOOD QUANT: CPT | Performed by: FAMILY MEDICINE

## 2020-10-20 PROCEDURE — 90686 IIV4 VACC NO PRSV 0.5 ML IM: CPT | Performed by: FAMILY MEDICINE

## 2020-10-20 PROCEDURE — 90471 IMMUNIZATION ADMIN: CPT | Performed by: FAMILY MEDICINE

## 2020-10-20 PROCEDURE — 90715 TDAP VACCINE 7 YRS/> IM: CPT | Performed by: FAMILY MEDICINE

## 2020-10-20 PROCEDURE — 36415 COLL VENOUS BLD VENIPUNCTURE: CPT | Performed by: FAMILY MEDICINE

## 2020-10-20 PROCEDURE — 90472 IMMUNIZATION ADMIN EACH ADD: CPT | Performed by: FAMILY MEDICINE

## 2020-10-20 RX ORDER — FLUOXETINE 20 MG/1
40 TABLET, FILM COATED ORAL DAILY
Qty: 60 TABLET | Refills: 11 | Status: SHIPPED | OUTPATIENT
Start: 2020-10-20 | End: 2021-01-25

## 2020-10-20 ASSESSMENT — MIFFLIN-ST. JEOR: SCORE: 1796.23

## 2020-10-20 NOTE — LETTER
October 22, 2020      Yobani MICKY Denny  [address]  [place] [facility] [address]        Dear ,    We are writing to inform you of your test results.    Your test results fall within the expected range(s) or remain unchanged from previous results.  Please continue with current treatment plan.    Resulted Orders   Lipid Profile (Chol, Trig, HDL, LDL calc)   Result Value Ref Range    Cholesterol 198 <200 mg/dL    Triglycerides 169 (H) <150 mg/dL      Comment:      Borderline high:  150-199 mg/dl  High:             200-499 mg/dl  Very high:       >499 mg/dl  Non Fasting      HDL Cholesterol 32 (L) >39 mg/dL    LDL Cholesterol Calculated 132 (H) <100 mg/dL      Comment:      Above desirable:  100-129 mg/dl  Borderline High:  130-159 mg/dL  High:             160-189 mg/dL  Very high:       >189 mg/dl      Non HDL Cholesterol 166 (H) <130 mg/dL      Comment:      Above Desirable:  130-159 mg/dl  Borderline high:  160-189 mg/dl  High:             190-219 mg/dl  Very high:       >219 mg/dl     Glucose   Result Value Ref Range    Glucose 96 70 - 99 mg/dL      Comment:      Non Fasting       If you have any questions or concerns, please call the clinic at the number listed above.       Sincerely,        Jaciel Wilde MD

## 2020-10-20 NOTE — PROGRESS NOTES
SUBJECTIVE:   CC: Yobani Baldwin is an 40 year old male who presents for preventative health visit.       Patient has been advised of split billing requirements and indicates understanding: Yes  Healthy Habits:     Getting at least 3 servings of Calcium per day:  Yes    Bi-annual eye exam:  NO    Dental care twice a year:  NO    Sleep apnea or symptoms of sleep apnea:  None    Diet:  Regular (no restrictions)    Frequency of exercise:  None    Taking medications regularly:  Yes    Medication side effects:  None    PHQ-2 Total Score: 2        Today's PHQ-2 Score:   PHQ-2 ( 1999 Pfizer) 10/20/2020   Q1: Little interest or pleasure in doing things 0   Q2: Feeling down, depressed or hopeless 2   PHQ-2 Score 2   Q1: Little interest or pleasure in doing things Not at all   Q2: Feeling down, depressed or hopeless More than half the days   PHQ-2 Score 2       Abuse: Current or Past(Physical, Sexual or Emotional)- No  Do you feel safe in your environment? Yes        Social History     Tobacco Use     Smoking status: Current Some Day Smoker     Smokeless tobacco: Never Used     Tobacco comment: sociallly   Substance Use Topics     Alcohol use: Yes     Alcohol/week: 0.0 standard drinks     Comment: socially     If you drink alcohol do you typically have >3 drinks per day or >7 drinks per week? No    Alcohol Use 10/20/2020   Prescreen: >3 drinks/day or >7 drinks/week? No   Prescreen: >3 drinks/day or >7 drinks/week? -   No flowsheet data found.    Last PSA: No results found for: PSA    Reviewed orders with patient. Reviewed health maintenance and updated orders accordingly - Yes  Lab work is in process  Labs reviewed in Epic    BP Readings from Last 3 Encounters:   10/20/20 (!) 145/77   04/12/19 134/84   07/20/18 140/84    Wt Readings from Last 3 Encounters:   10/20/20 88 kg (194 lb)   04/12/19 92.3 kg (203 lb 7 oz)   07/20/18 91.5 kg (201 lb 11.2 oz)                  Patient Active Problem List   Diagnosis     Allergic  rhinitis     CARDIOVASCULAR SCREENING; LDL GOAL LESS THAN 160     Olecranon bursitis     Major depression in remission (H)     History reviewed. No pertinent surgical history.    Social History     Tobacco Use     Smoking status: Current Some Day Smoker     Smokeless tobacco: Never Used     Tobacco comment: sociallly   Substance Use Topics     Alcohol use: Yes     Alcohol/week: 0.0 standard drinks     Comment: socially     Family History   Problem Relation Age of Onset     Family History Negative Father      Mental Illness Mother      Diabetes No family hx of      Coronary Artery Disease No family hx of      Hypertension No family hx of      Hyperlipidemia No family hx of      Cerebrovascular Disease No family hx of      Breast Cancer No family hx of      Colon Cancer No family hx of      Prostate Cancer No family hx of      Other Cancer No family hx of      Depression No family hx of      Anxiety Disorder No family hx of      Substance Abuse No family hx of      Anesthesia Reaction No family hx of      Asthma No family hx of      Osteoporosis No family hx of      Genetic Disorder No family hx of      Thyroid Disease No family hx of      Obesity No family hx of      Unknown/Adopted No family hx of          Current Outpatient Medications   Medication Sig Dispense Refill     sildenafil (VIAGRA) 100 MG tablet Take 0.5-1 tablets ( mg) by mouth daily as needed for erectile dysfunction Take 30 min to 4 hours before intercourse. (Patient not taking: Reported on 11/24/2017) 6 tablet 1     No Known Allergies    Reviewed and updated as needed this visit by clinical staff                 Reviewed and updated as needed this visit by Provider                    Review of Systems  CONSTITUTIONAL: NEGATIVE for fever, chills, change in weight  INTEGUMENTARY/SKIN: NEGATIVE for worrisome rashes, moles or lesions  EYES: NEGATIVE for vision changes or irritation  ENT: NEGATIVE for ear, mouth and throat problems  RESP: NEGATIVE  "for significant cough or SOB  CV: NEGATIVE for chest pain, palpitations or peripheral edema  GI: NEGATIVE for nausea, abdominal pain, heartburn, or change in bowel habits   male: negative for dysuria, hematuria, decreased urinary stream, erectile dysfunction, urethral discharge  MUSCULOSKELETAL: NEGATIVE for significant arthralgias or myalgia  NEURO: NEGATIVE for weakness, dizziness or paresthesias  PSYCHIATRIC: NEGATIVE for changes in mood or affect    OBJECTIVE:   BP (!) 145/77   Pulse 77   Temp 96.7  F (35.9  C) (Tympanic)   Resp 16   Ht 1.778 m (5' 10\")   Wt 88 kg (194 lb)   SpO2 98%   BMI 27.84 kg/m      Physical Exam    General Appearance: Pleasant, alert, in no acute respiratory distress.  Head Exam: Normal. Normocephalic, atraumatic. No sinus tenderness.  Eye Exam: Normal external eye, conjunctiva, lids. DUANE.  Ear Exam: Normal auditory canals and external ears. Non-tender.  Left TM-normal. Right TM-normal.  OroPharynx Exam: Dental hygiene adequate. Normal buccal mucosa. Normal pharynx.  Neck Exam: Supple, no masses or enlarged, tender nodes.  Thyroid Exam: No nodules or enlargement or tenderness.  Chest/Respiratory Exam: Normal, comfortable, easy respirations. Chest wall normal. Lungs are clear to auscultation. No wheezing, crackles, or rhonchi.  Cardiovascular Exam: Regular rate and rhythm. No murmur, gallop, or rubs. No pedal edema.  Gastrointestinal Exam: Soft, non-tender, no masses or organomegaly.  Musculoskeletal Exam: Back is non-tender, full ROM of upper and lower extremities.  Skin: no rash, warm and dry.    Neurologic Exam: Nonfocal, no tremor. Normal gait.  Psychiatric Exam: Alert - appropriate, normal affect      ASSESSMENT/PLAN:   1. Routine general medical examination at a health care facility      2. Major depressive disorder, single episode, severe without psychotic features (H)    reoccurance    - FLUoxetine 20 MG tablet; Take 2 tablets (40 mg) by mouth daily  Dispense: 60 tablet; " "Refill: 11  - MENTAL HEALTH REFERRAL  - Adult; Outpatient Treatment; Individual/Couples/Family/Group Therapy/Health Psychology; [facility] [place]: [facility] [phone]; We will contact you to schedule the appointment or please call with any questions    3. Need for vaccination    - INFLUENZA VACCINE IM > 6 MONTHS VALENT IIV4 [66025]  - TDAP VACCINE (Adacel, Boostrix)  [[id number]]    4. Lipid screening    - Lipid Profile (Chol, Trig, HDL, LDL calc)    5. Diabetes mellitus screening    - Glucose    Patient has been advised of split billing requirements and indicates understanding: Yes  COUNSELING:   Reviewed preventive health counseling, as reflected in patient instructions       Regular exercise       Healthy diet/nutrition    Estimated body mass index is 27.98 kg/m  as calculated from the following:    Height as of 4/12/19: 1.816 m (5' 11.5\").    Weight as of 4/12/19: 92.3 kg (203 lb 7 oz).         He reports that he has been smoking. He has never used smokeless tobacco.  Tobacco Cessation Action Plan:   Information offered: Patient not interested at this time      Counseling Resources:  ATP IV Guidelines  Pooled Cohorts Equation Calculator  FRAX Risk Assessment  ICSI Preventive Guidelines  Dietary Guidelines for Americans, 2010  Personal [employer]'s MyPlate  ASA Prophylaxis  Lung CA Screening    Jaciel Wilde MD  [facility] [place]  "

## 2020-10-21 LAB
CHOLEST SERPL-MCNC: 198 MG/DL
GLUCOSE SERPL-MCNC: 96 MG/DL (ref 70–99)
HDLC SERPL-MCNC: 32 MG/DL
LDLC SERPL CALC-MCNC: 132 MG/DL
NONHDLC SERPL-MCNC: 166 MG/DL
TRIGL SERPL-MCNC: 169 MG/DL

## 2021-01-25 ENCOUNTER — OFFICE VISIT (OUTPATIENT)
Dept: FAMILY MEDICINE | Facility: CLINIC | Age: 41
End: 2021-01-25
Payer: COMMERCIAL

## 2021-01-25 VITALS
HEIGHT: 70 IN | SYSTOLIC BLOOD PRESSURE: 136 MMHG | TEMPERATURE: 96.3 F | DIASTOLIC BLOOD PRESSURE: 82 MMHG | OXYGEN SATURATION: 99 % | BODY MASS INDEX: 27.75 KG/M2 | HEART RATE: 67 BPM | WEIGHT: 193.8 LBS | RESPIRATION RATE: 20 BRPM

## 2021-01-25 DIAGNOSIS — F32.5 MAJOR DEPRESSION IN REMISSION (H): ICD-10-CM

## 2021-01-25 DIAGNOSIS — Z80.42 FAMILY HX OF PROSTATE CANCER: ICD-10-CM

## 2021-01-25 DIAGNOSIS — E78.2 MODERATE MIXED HYPERLIPIDEMIA NOT REQUIRING STATIN THERAPY: Primary | ICD-10-CM

## 2021-01-25 PROCEDURE — 99213 OFFICE O/P EST LOW 20 MIN: CPT | Performed by: FAMILY MEDICINE

## 2021-01-25 ASSESSMENT — PATIENT HEALTH QUESTIONNAIRE - PHQ9
10. IF YOU CHECKED OFF ANY PROBLEMS, HOW DIFFICULT HAVE THESE PROBLEMS MADE IT FOR YOU TO DO YOUR WORK, TAKE CARE OF THINGS AT HOME, OR GET ALONG WITH OTHER PEOPLE: NOT DIFFICULT AT ALL
SUM OF ALL RESPONSES TO PHQ QUESTIONS 1-9: 1
SUM OF ALL RESPONSES TO PHQ QUESTIONS 1-9: 1

## 2021-01-25 ASSESSMENT — MIFFLIN-ST. JEOR: SCORE: 1801.67

## 2021-01-25 NOTE — PROGRESS NOTES
"SUBJECTIVE:   CC: Yobani Baldwin is an 40 year old male who presents for \"Checkup\"  Though just had a preventative visit 3 months ago    We discussed instead talking about high cholesterol and Depression follow up    Also he stated that he has a family history of Colon cancer  After the visit he called back and told us that this actually was prostate cancer, in which case we are cancelling the planned colonoscopy      AP:      Moderate mixed hyperlipidemia not requiring statin therapy    The 10-year ASCVD risk score (Emily DEWEY Jr., et al., 2013) is: 6.7%    Values used to calculate the score:      Age: 40 years      Sex: Male      Is Non- : Yes      Diabetic: No      Tobacco smoker: Yes      Systolic Blood Pressure: 136 mmHg      Is BP treated: No      HDL Cholesterol: 32 mg/dL      Total Cholesterol: 198 mg/dL    Reviewed dietary guidelines. Rec smoking cessation      Major depression in remission (H)  Off of prozac, but could use therapy  Referred    - MENTAL HEALTH REFERRAL  - Adult; Outpatient Treatment; Individual/Couples/Family/Group Therapy/Health Psychology; [facility] [place]: [facility] [phone]; We will contact you to schedule the appointment or please call with any questions          Family history of prostate cancer       Jaciel Wilde MD  [facility]            Chief Complaint   Patient presents with     Physical         Healthy Habits:     Getting at least 3 servings of Calcium per day:  NO    Bi-annual eye exam:  NO    Dental care twice a year:  NO    Sleep apnea or symptoms of sleep apnea:  None    Diet:  Regular (no restrictions)    Frequency of exercise:  2-3 days/week    Duration of exercise:  30-45 minutes    Taking medications regularly:  Yes    Medication side effects:  None and Other    PHQ-2 Total Score: 0    Additional concerns today:  Yes    Answers for HPI/ROS submitted by the patient on 1/25/2021   Annual Exam:  If you " checked off any problems, how difficult have these problems made it for you to do your work, take care of things at home, or get along with other people?: Not difficult at all  PHQ9 TOTAL SCORE: 1      Today's PHQ-2 Score:   PHQ-2 ( 1999 Pfizer) 1/25/2021   Q1: Little interest or pleasure in doing things 0   Q2: Feeling down, depressed or hopeless 0   PHQ-2 Score 0   Q1: Little interest or pleasure in doing things Not at all   Q2: Feeling down, depressed or hopeless Not at all   PHQ-2 Score 0       Abuse: Current or Past(Physical, Sexual or Emotional)- No  Do you feel safe in your environment? Yes        Social History     Tobacco Use     Smoking status: Current Some Day Smoker     Smokeless tobacco: Never Used     Tobacco comment: sociallly   Substance Use Topics     Alcohol use: Yes     Alcohol/week: 0.0 standard drinks     Comment: socially     If you drink alcohol do you typically have >3 drinks per day or >7 drinks per week? No    Alcohol Use 1/25/2021   Prescreen: >3 drinks/day or >7 drinks/week? No   Prescreen: >3 drinks/day or >7 drinks/week? -   No flowsheet data found.    Last PSA: No results found for: PSA    Reviewed orders with patient. Reviewed health maintenance and updated orders accordingly - Yes  Labs reviewed in EPIC  BP Readings from Last 3 Encounters:   01/25/21 136/82   10/20/20 (!) 145/77   04/12/19 134/84    Wt Readings from Last 3 Encounters:   01/25/21 87.9 kg (193 lb 12.8 oz)   10/20/20 88 kg (194 lb)   04/12/19 92.3 kg (203 lb 7 oz)                    Reviewed and updated as needed this visit by clinical staff  Tobacco  Allergies  Meds   Med Hx  Surg Hx  Fam Hx  Soc Hx        Reviewed and updated as needed this visit by Provider                    Review of Systems  10 point ROS of systems including Constitutional, Eyes, Respiratory, Cardiovascular, Gastroenterology, Genitourinary, Integumentary, Muscularskeletal, Psychiatric were all negative except for pertinent positives noted in  "my HPI.      OBJECTIVE:   /86 (Patient Position: Sitting, Cuff Size: Adult Regular)   Pulse 67   Temp 96.3  F (35.7  C) (Tympanic)   Resp 20   Ht 1.788 m (5' 10.4\")   Wt 87.9 kg (193 lb 12.8 oz)   SpO2 99%   BMI 27.49 kg/m      Physical Exam    GROOMING: Adequately groomed.  ATTIRE: Appropriate.  AGE: Appears stated.  BEHAVIOR TOWARDS EXAMINER: Cooperative.  MOTOR ACTIVITY: Unremarkable.  EYE CONTACT: Appropriate.  Mood:  good  Affect:  mood congruent  SPEECH/LANGUAGE: Unremarkable.  ATTENTION: Unremarkable.  CONCENTRATION: Unremarkable.  THOUGHT PROCESS: Unremarkable  THOUGHT CONTENT: Unremarkable for hallucinations and delusions.  ORIENTATION: Times 3.  MEMORY: No evidence of impairment.  JUDGMENT: No evidence of impairment.  ESTIMATED INTELLIGENCE: Average.  DEMONSTRATED INSIGHT: Adequate.  FUND OF KNOWLEDGE: Adequate.  SUICIDE RISK: None apparent and denied.      Diagnostic Test Results:  Labs reviewed in Epic      "

## 2021-01-25 NOTE — PATIENT INSTRUCTIONS
Elevated cholesterol numbers come from a combination of age, heredity and lifestyle.  Elevated cholesterol is not a disease in and of itself but a sign of a metabolic problem that needs changing.  Since you can't change age and heredity, you'll have to work on the lifestyle.  In general I have come under the belief that eating specifically to lower cholesterol makes little sense, and the advice is confusing and contradictory.  For example intake of dietary cholesterol does not actually translate to high blood cholesterol.  Overall what you want is good health, and eating toward that general idea should be your goal. A plant-rich diet that is low in refined foods, processed meats, and added sugars is best. If you have excess weight to lose, eating well should help you lose it, and the cholesterol is just a marker that should improve with your overall health.    In general diets that are more whole food and plant-based tend to have lower risk for heart disease.  The traditional Mediterranean diet is well-known for being a diet like this and while it has some meat in it, that is mostly fish, and not with every meal.  Also, large amounts of refined carbohydrates such as white bread and pasta is the [employer], not the traditional Mediterranean diet.  For information on this  I would refer you to https://oldOpenTablept.org/traditional-diets    Exercise is also important for overall health.  You shouldn't make the mistake thinking that exercise is the mireles to either weight loss or lowering cholesterol, as research shows it's not a terribly effective method to achieve either,  though generally people will have improved cholesterol numbers when their fitness is improved.  Shoot for 30 minutes of sweat-producing exercise 5 times a week.

## 2021-03-17 ENCOUNTER — TRANSFERRED RECORDS (OUTPATIENT)
Dept: HEALTH INFORMATION MANAGEMENT | Facility: CLINIC | Age: 41
End: 2021-03-17
Payer: COMMERCIAL

## 2021-08-23 ENCOUNTER — TRANSFERRED RECORDS (OUTPATIENT)
Dept: HEALTH INFORMATION MANAGEMENT | Facility: CLINIC | Age: 41
End: 2021-08-23

## 2021-09-03 ENCOUNTER — TRANSFERRED RECORDS (OUTPATIENT)
Dept: HEALTH INFORMATION MANAGEMENT | Facility: CLINIC | Age: 41
End: 2021-09-03

## 2021-09-09 ENCOUNTER — OFFICE VISIT (OUTPATIENT)
Dept: FAMILY MEDICINE | Facility: CLINIC | Age: 41
End: 2021-09-09
Payer: COMMERCIAL

## 2021-09-09 VITALS
RESPIRATION RATE: 20 BRPM | OXYGEN SATURATION: 99 % | BODY MASS INDEX: 26.05 KG/M2 | HEIGHT: 72 IN | TEMPERATURE: 98.5 F | HEART RATE: 64 BPM | WEIGHT: 192.3 LBS | SYSTOLIC BLOOD PRESSURE: 128 MMHG | DIASTOLIC BLOOD PRESSURE: 79 MMHG

## 2021-09-09 DIAGNOSIS — R93.89 ABNORMAL FINDING ON RADIOLOGY EXAM: Primary | ICD-10-CM

## 2021-09-09 PROCEDURE — 99212 OFFICE O/P EST SF 10 MIN: CPT | Performed by: FAMILY MEDICINE

## 2021-09-09 ASSESSMENT — MIFFLIN-ST. JEOR: SCORE: 1815.27

## 2021-09-09 NOTE — PROGRESS NOTES
Assessment & Plan     Abnormal finding on radiology exam  Some coordination of care records requested  I will call him once I get this to review it      Jaciel Wilde MD  [facility]      Tran Hilton is a 41 year old who presents for the following health issues     HPI     Pt here to discuss CDI XR results.  He thought that these results would be here because he was told that they would fax over but we were unable to find them  Apparently there was an abnormality in a CT scan of his head    We reviewed some recent CT scans these were related to other injuries    But could not find what he is talking about    Records obtained through care everywhere    Review of Systems   Constitutional, HEENT, cardiovascular, pulmonary, gi and gu systems are negative, except as otherwise noted.      Objective    /79 (Patient Position: Sitting, Cuff Size: Adult Regular)   Pulse 64   Temp 98.5  F (36.9  C)   Resp 20   Ht 1.829 m (6')   Wt 87.2 kg (192 lb 4.8 oz)   SpO2 99%   BMI 26.08 kg/m    Body mass index is 26.08 kg/m .  Physical Exam   GENERAL: healthy, alert and no distress

## 2021-11-02 ENCOUNTER — TRANSFERRED RECORDS (OUTPATIENT)
Dept: HEALTH INFORMATION MANAGEMENT | Facility: CLINIC | Age: 41
End: 2021-11-02
Payer: COMMERCIAL

## 2022-01-20 NOTE — PROGRESS NOTES
SUBJECTIVE:   CC: Yobani Baldwin is an 41 year old male who presents for preventative health visit.         Healthy Habits:     Getting at least 3 servings of Calcium per day:  Yes    Bi-annual eye exam:  NO    Dental care twice a year:  NO    Sleep apnea or symptoms of sleep apnea:  Daytime drowsiness    Diet:  Vegetarian/vegan    Frequency of exercise:  1 day/week    Duration of exercise:  15-30 minutes    Taking medications regularly:  Yes    Medication side effects:  None and Other    PHQ-2 Total Score: 0    Additional concerns today:  No     Answers for HPI/ROS submitted by the patient on 1/25/2022  If you checked off any problems, how difficult have these problems made it for you to do your work, take care of things at home, or get along with other people?: Somewhat difficult  PHQ9 TOTAL SCORE: 2      Stools were watery, and very dark    Almost black    Has hip pain, every other day taking ibuprofen 800mg     Also night sweats while sleeping    Having daily headaches    Doing workup today for melena  Upper abdominal pain Differential Dx includes Gall bladder issue, ulcer, dyspepsia, pancreatitis, bowel problem,  GI bleeding is the major concern    Will Follow up pending lab tests with patient by phone        Today's PHQ-2 Score:   PHQ-2 ( 1999 [employer]) 1/25/2022   Q1: Little interest or pleasure in doing things 0   Q2: Feeling down, depressed or hopeless 0   PHQ-2 Score 0   PHQ-2 Total Score (12-17 Years)- Positive if 3 or more points; Administer PHQ-A if positive -   Q1: Little interest or pleasure in doing things Not at all   Q2: Feeling down, depressed or hopeless Not at all   PHQ-2 Score 0       Abuse: Current or Past(Physical, Sexual or Emotional)- No  Do you feel safe in your environment? Yes    Have you ever done Advance Care Planning? (For example, a Health Directive, POLST, or a discussion with a medical provider or your loved ones about your wishes): No, advance care planning information given  to patient to review.  Patient declined advance care planning discussion at this time.    Social History     Tobacco Use     Smoking status: Current Some Day Smoker     Types: Cigars     Smokeless tobacco: Never Used     Tobacco comment: sociallly   Substance Use Topics     Alcohol use: Yes     Alcohol/week: 0.0 standard drinks     Comment: socially     If you drink alcohol do you typically have >3 drinks per day or >7 drinks per week? No    Alcohol Use 1/25/2022   Prescreen: >3 drinks/day or >7 drinks/week? No   Prescreen: >3 drinks/day or >7 drinks/week? -   No flowsheet data found.    Last PSA: No results found for: PSA    Reviewed orders with patient. Reviewed health maintenance and updated orders accordingly - Yes  Lab work is in process  Labs reviewed in EPIC  BP Readings from Last 3 Encounters:   01/25/22 (!) 154/89   09/09/21 128/79   01/25/21 136/82    Wt Readings from Last 3 Encounters:   01/25/22 88.5 kg (195 lb 3.2 oz)   09/09/21 87.2 kg (192 lb 4.8 oz)   01/25/21 87.9 kg (193 lb 12.8 oz)                  Patient Active Problem List   Diagnosis     Allergic rhinitis     CARDIOVASCULAR SCREENING; LDL GOAL LESS THAN 160     Olecranon bursitis     Major depression in remission (H)     Family hx of prostate cancer     No past surgical history on file.    Social History     Tobacco Use     Smoking status: Current Some Day Smoker     Types: Cigars     Smokeless tobacco: Never Used     Tobacco comment: sociallly   Substance Use Topics     Alcohol use: Yes     Alcohol/week: 0.0 standard drinks     Comment: socially     Family History   Problem Relation Age of Onset     Family History Negative Father      Mental Illness Mother      Diabetes No family hx of      Coronary Artery Disease No family hx of      Hypertension No family hx of      Hyperlipidemia No family hx of      Cerebrovascular Disease No family hx of      Breast Cancer No family hx of      Colon Cancer No family hx of      Prostate Cancer No family  "hx of      Other Cancer No family hx of      Depression No family hx of      Anxiety Disorder No family hx of      Substance Abuse No family hx of      Anesthesia Reaction No family hx of      Asthma No family hx of      Osteoporosis No family hx of      Genetic Disorder No family hx of      Thyroid Disease No family hx of      Obesity No family hx of      Unknown/Adopted No family hx of          No current outpatient medications on file.       Reviewed and updated as needed this visit by clinical staff                Reviewed and updated as needed this visit by Provider                 Review of Systems   Constitutional: Negative for chills and fever.   HENT: Negative for congestion, ear pain, hearing loss and sore throat.    Eyes: Negative for pain.   Respiratory: Negative for cough.    Cardiovascular: Negative for chest pain, palpitations and peripheral edema.   Gastrointestinal: Positive for diarrhea and heartburn. Negative for abdominal pain, constipation, hematochezia and nausea.   Genitourinary: Negative for dysuria, frequency, genital sores, hematuria and urgency.   Musculoskeletal: Positive for arthralgias and myalgias. Negative for joint swelling.   Skin: Negative for rash.   Neurological: Positive for headaches. Negative for dizziness and paresthesias.   Psychiatric/Behavioral: Negative for mood changes. The patient is nervous/anxious.          OBJECTIVE:   BP (!) 154/89   Pulse 66   Temp 98  F (36.7  C) (Temporal)   Ht 1.774 m (5' 9.84\")   Wt 88.5 kg (195 lb 3.2 oz)   SpO2 100%   BMI 28.13 kg/m      Physical Exam    General Appearance: Pleasant, alert, in no acute respiratory distress.  Head Exam: Normal. Normocephalic, atraumatic. No sinus tenderness.  Eye Exam: Normal external eye, conjunctiva, lids. DUANE.  Ear Exam: Normal auditory canals and external ears. Non-tender.  Left TM-normal. Right TM-normal.  Neck Exam: Supple, no obvious thyroid enlargement  Chest/Respiratory Exam: Normal, " "comfortable, easy respirations. Chest wall normal. Lungs are clear to auscultation. No wheezing, crackles, or rhonchi.  Cardiovascular Exam: Regular rate and rhythm. No murmur, gallop, or rubs.  Musculoskeletal Exam: Back is non-tender, full ROM of upper and lower extremities.  Skin: no rash, warm and dry.    Neurologic Exam: Nonfocal, no tremor. Normal gait.  Psychiatric Exam: Alert - appropriate, normal affect        ASSESSMENT/PLAN:       ICD-10-CM    1. Routine general medical examination at a health care facility  Z00.00    2. Melena  K92.1 CBC with platelets and differential     Fecal colorectal cancer screen (FIT)     Comprehensive metabolic panel (BMP + Alb, Alk Phos, ALT, AST, Total. Bili, TP)   3. Need for vaccination  Z23 COVID-19,PF,PFIZER (12+ Yrs GRAY LABEL)         COUNSELING:   Reviewed preventive health counseling, as reflected in patient instructions       Regular exercise       Healthy diet/nutrition    Estimated body mass index is 28.13 kg/m  as calculated from the following:    Height as of this encounter: 1.774 m (5' 9.84\").    Weight as of this encounter: 88.5 kg (195 lb 3.2 oz).       He reports that he has been smoking cigars. He has never used smokeless tobacco.  Tobacco Cessation Action Plan:   Information offered: Patient not interested at this time      Counseling Resources:  ATP IV Guidelines  Pooled Cohorts Equation Calculator  FRAX Risk Assessment  ICSI Preventive Guidelines  Dietary Guidelines for Americans, 2010  USDA's MyPlate  ASA Prophylaxis  Lung CA Screening    Jaciel Wilde MD  [facility] [place]  "

## 2022-01-25 ENCOUNTER — OFFICE VISIT (OUTPATIENT)
Dept: FAMILY MEDICINE | Facility: CLINIC | Age: 42
End: 2022-01-25
Payer: COMMERCIAL

## 2022-01-25 VITALS
DIASTOLIC BLOOD PRESSURE: 89 MMHG | TEMPERATURE: 98 F | WEIGHT: 195.2 LBS | BODY MASS INDEX: 27.94 KG/M2 | SYSTOLIC BLOOD PRESSURE: 154 MMHG | HEIGHT: 70 IN | OXYGEN SATURATION: 100 % | HEART RATE: 66 BPM

## 2022-01-25 DIAGNOSIS — F32.5 MAJOR DEPRESSION IN REMISSION (H): ICD-10-CM

## 2022-01-25 DIAGNOSIS — Z23 NEED FOR VACCINATION: ICD-10-CM

## 2022-01-25 DIAGNOSIS — Z00.00 ROUTINE GENERAL MEDICAL EXAMINATION AT A HEALTH CARE FACILITY: Primary | ICD-10-CM

## 2022-01-25 DIAGNOSIS — K92.1 MELENA: ICD-10-CM

## 2022-01-25 LAB
BASOPHILS # BLD AUTO: 0 10E3/UL (ref 0–0.2)
BASOPHILS NFR BLD AUTO: 0 %
EOSINOPHIL # BLD AUTO: 0.3 10E3/UL (ref 0–0.7)
EOSINOPHIL NFR BLD AUTO: 3 %
ERYTHROCYTE [DISTWIDTH] IN BLOOD BY AUTOMATED COUNT: 14.1 % (ref 10–15)
HCT VFR BLD AUTO: 44.7 % (ref 40–53)
HGB BLD-MCNC: 14.7 G/DL (ref 13.3–17.7)
LYMPHOCYTES # BLD AUTO: 2.8 10E3/UL (ref 0.8–5.3)
LYMPHOCYTES NFR BLD AUTO: 29 %
MCH RBC QN AUTO: 29.1 PG (ref 26.5–33)
MCHC RBC AUTO-ENTMCNC: 32.9 G/DL (ref 31.5–36.5)
MCV RBC AUTO: 88 FL (ref 78–100)
MONOCYTES # BLD AUTO: 0.7 10E3/UL (ref 0–1.3)
MONOCYTES NFR BLD AUTO: 7 %
NEUTROPHILS # BLD AUTO: 5.7 10E3/UL (ref 1.6–8.3)
NEUTROPHILS NFR BLD AUTO: 61 %
PLATELET # BLD AUTO: 239 10E3/UL (ref 150–450)
RBC # BLD AUTO: 5.06 10E6/UL (ref 4.4–5.9)
WBC # BLD AUTO: 9.4 10E3/UL (ref 4–11)

## 2022-01-25 PROCEDURE — 91305 COVID-19,PF,PFIZER (12+ YRS): CPT | Performed by: FAMILY MEDICINE

## 2022-01-25 PROCEDURE — 99213 OFFICE O/P EST LOW 20 MIN: CPT | Mod: 25 | Performed by: FAMILY MEDICINE

## 2022-01-25 PROCEDURE — 80053 COMPREHEN METABOLIC PANEL: CPT | Performed by: FAMILY MEDICINE

## 2022-01-25 PROCEDURE — 85025 COMPLETE CBC W/AUTO DIFF WBC: CPT | Performed by: FAMILY MEDICINE

## 2022-01-25 PROCEDURE — 82274 ASSAY TEST FOR BLOOD FECAL: CPT | Performed by: FAMILY MEDICINE

## 2022-01-25 PROCEDURE — 0054A COVID-19,PF,PFIZER (12+ YRS): CPT | Performed by: FAMILY MEDICINE

## 2022-01-25 PROCEDURE — 36415 COLL VENOUS BLD VENIPUNCTURE: CPT | Performed by: FAMILY MEDICINE

## 2022-01-25 PROCEDURE — 99396 PREV VISIT EST AGE 40-64: CPT | Performed by: FAMILY MEDICINE

## 2022-01-25 RX ORDER — CYCLOBENZAPRINE HCL 10 MG
10 TABLET ORAL PRN
Status: ON HOLD | COMMUNITY
Start: 2021-05-21 | End: 2023-02-02

## 2022-01-25 RX ORDER — IBUPROFEN 800 MG/1
800 TABLET, FILM COATED ORAL EVERY 8 HOURS PRN
COMMUNITY
Start: 2021-09-20 | End: 2023-07-05

## 2022-01-25 ASSESSMENT — ENCOUNTER SYMPTOMS
FEVER: 0
PALPITATIONS: 0
CHILLS: 0
COUGH: 0
HEADACHES: 1
HEMATURIA: 0
HEMATOCHEZIA: 0
PARESTHESIAS: 0
FREQUENCY: 0
DIARRHEA: 1
DIZZINESS: 0
NAUSEA: 0
NERVOUS/ANXIOUS: 1
MYALGIAS: 1
ABDOMINAL PAIN: 0
CONSTIPATION: 0
ARTHRALGIAS: 1
SORE THROAT: 0
DYSURIA: 0
EYE PAIN: 0
HEARTBURN: 1
JOINT SWELLING: 0

## 2022-01-25 ASSESSMENT — PATIENT HEALTH QUESTIONNAIRE - PHQ9
SUM OF ALL RESPONSES TO PHQ QUESTIONS 1-9: 2
SUM OF ALL RESPONSES TO PHQ QUESTIONS 1-9: 2
10. IF YOU CHECKED OFF ANY PROBLEMS, HOW DIFFICULT HAVE THESE PROBLEMS MADE IT FOR YOU TO DO YOUR WORK, TAKE CARE OF THINGS AT HOME, OR GET ALONG WITH OTHER PEOPLE: SOMEWHAT DIFFICULT

## 2022-01-25 ASSESSMENT — MIFFLIN-ST. JEOR: SCORE: 1794.18

## 2022-01-26 ENCOUNTER — TELEPHONE (OUTPATIENT)
Dept: GASTROENTEROLOGY | Facility: CLINIC | Age: 42
End: 2022-01-26
Payer: COMMERCIAL

## 2022-01-26 LAB
ALBUMIN SERPL-MCNC: 4 G/DL (ref 3.4–5)
ALP SERPL-CCNC: 78 U/L (ref 40–150)
ALT SERPL W P-5'-P-CCNC: 50 U/L (ref 0–70)
ANION GAP SERPL CALCULATED.3IONS-SCNC: 2 MMOL/L (ref 3–14)
AST SERPL W P-5'-P-CCNC: 28 U/L (ref 0–45)
BILIRUB SERPL-MCNC: 0.3 MG/DL (ref 0.2–1.3)
BUN SERPL-MCNC: 12 MG/DL (ref 7–30)
CALCIUM SERPL-MCNC: 9.2 MG/DL (ref 8.5–10.1)
CHLORIDE BLD-SCNC: 114 MMOL/L (ref 94–109)
CO2 SERPL-SCNC: 26 MMOL/L (ref 20–32)
CREAT SERPL-MCNC: 0.93 MG/DL (ref 0.66–1.25)
GFR SERPL CREATININE-BSD FRML MDRD: >90 ML/MIN/1.73M2
GLUCOSE BLD-MCNC: 80 MG/DL (ref 70–99)
POTASSIUM BLD-SCNC: 4.5 MMOL/L (ref 3.4–5.3)
PROT SERPL-MCNC: 7.3 G/DL (ref 6.8–8.8)
SODIUM SERPL-SCNC: 142 MMOL/L (ref 133–144)

## 2022-01-26 NOTE — TELEPHONE ENCOUNTER
Screening Questions  Blue=prep questions Red=location Green=sedation   1. Are you active on mychart? NO - USPS     2. What insurance is in the chart? PO      3.  Ordering/Referring Provider: Jaciel Wilde MD    4. BMI 26.4, If greater than 40 review exclusion criteria also will need EXTENDED PREP    5.  Respiratory Screening (If yes to any of the following HOSPITAL setting only):     Do you use daily home oxygen? N  Do you have mod to severe Obstructive Sleep Apnea? N (can be seen at [facility] or hospital setting)    Do you have Pulmonary Hypertension? N   Do you have UNCONTROLLED asthma? N    6. Have you had a heart or lung transplant? N  (If yes, please review exclusion criteria)    7. Are you currently on dialysis?N  (If yes, schedule in HOSPITAL setting only)(If yes, please send Golytely prep)    8. Do you have chronic kidney disease? N (If yes, please send Golytely prep)    9. Have you had a stroke or Transient ischemic attack (TIA) within 6 months? N (If yes, do not schedule at [facility])    10. In the past 6 months, have you had any heart related issues including cardiomyopathy or heart attack? N (If yes, please review exclusion criteria)           If yes, did it require cardiac stenting or other implantable device?N  (If yes, please review exclusion criteria)      11. Do you have any implantable devices in your body (pacemaker, defib, LVAD)? N (If yes, schedule at [facility])    12. Do you take nitroglycerin? If yes, how often? N (if yes, schedule at HOSPITAL setting)    13. Are you currently taking any blood thinners?N (If yes- inform patient to follow up with PCP or provider for follow up instructions)     14. Are you a diabetic? N (If yes, please send Golytely prep)    15. (Females) Are you currently pregnant?   If yes, how many weeks?      16. Are you taking any prescription pain medications on a routine schedule? N  If yes, MAC sedation and patient will need EXTENDED PREP.    17. Do you have any chemical  dependencies such as alcohol, street drugs, or methadone? N If yes, MAC sedation     18. Do you have any history of post-traumatic stress syndrome, severe anxiety or history of psychosis? N  If yes, MAC sedation.     19. Do you transfer independently? Y    20.  Do you have any issues with constipation? N   If yes, pt will need EXTENDED PREP     21. Preferred Pharmacy for Pre Prescription [facility]/ On Chart     Scheduling Details    Which Colonoscopy Prep was Sent?: LESTER   Type of Procedure Scheduled: Colonoscopy   Surgeon: Elise   Date of Procedure: 02/09/2022  Location:  GI   Caller (Please ask for phone number if not scheduled by patient): Yobani Baldwin         Sedation Type: CS   Conscious Sedation- Needs  for 6 hours after the procedure  MAC/General-Needs  for 24 hours after procedure    Pre-op Required at [facility], [place], [place] and OR for MAC sedation: N  (if yes advise patient they will need a pre-op prior to procedure)      Informed patient they will need an adult  Y  Cannot take any type of public or medical transportation alone    Pre-Procedure Covid test to be completed at [facility] or Externally: Y    Confirmed Nurse will call to complete assessment Y    Additional comments: N  ([facility] PATI[facility] NEED EXTENDED PREP)

## 2022-01-28 LAB — HEMOCCULT STL QL IA: POSITIVE

## 2022-01-31 ENCOUNTER — TELEPHONE (OUTPATIENT)
Dept: FAMILY MEDICINE | Facility: CLINIC | Age: 42
End: 2022-01-31
Payer: COMMERCIAL

## 2022-01-31 DIAGNOSIS — Z11.59 ENCOUNTER FOR SCREENING FOR OTHER VIRAL DISEASES: Primary | ICD-10-CM

## 2022-01-31 NOTE — TELEPHONE ENCOUNTER
JF,  Informed pt below.  He says you also left a message about his kidneys but he had more questions.  I am not able to see that message in chart.  Please advise.  Thanks,  La Steve RN

## 2022-01-31 NOTE — TELEPHONE ENCOUNTER
Left message for patient to call [facility] back  When patient calls back please transfer to LAZ ARRIAZA RN

## 2022-01-31 NOTE — TELEPHONE ENCOUNTER
Good.  He should also get an Upper gi scheduled as well.  And do that if the colonoscopy looks normal.  Or whichever he can get done first really

## 2022-01-31 NOTE — TELEPHONE ENCOUNTER
JF,   Pt calling for results  Couple things off in CMP  FIT positive - pt already has colonoscopy scheduled for 2/9/2022  Please advise  Thanks,  Lana ARRIAZA RN

## 2022-02-05 ENCOUNTER — LAB (OUTPATIENT)
Dept: LAB | Facility: CLINIC | Age: 42
End: 2022-02-05
Payer: COMMERCIAL

## 2022-02-05 DIAGNOSIS — Z11.59 ENCOUNTER FOR SCREENING FOR OTHER VIRAL DISEASES: ICD-10-CM

## 2022-02-05 PROCEDURE — 99000 SPECIMEN HANDLING OFFICE-LAB: CPT | Performed by: PATHOLOGY

## 2022-02-05 PROCEDURE — U0003 INFECTIOUS AGENT DETECTION BY NUCLEIC ACID (DNA OR RNA); SEVERE ACUTE RESPIRATORY SYNDROME CORONAVIRUS 2 (SARS-COV-2) (CORONAVIRUS DISEASE [COVID-19]), AMPLIFIED PROBE TECHNIQUE, MAKING USE OF HIGH THROUGHPUT TECHNOLOGIES AS DESCRIBED BY CMS-2020-01-R: HCPCS | Mod: 90 | Performed by: PATHOLOGY

## 2022-02-05 PROCEDURE — U0005 INFEC AGEN DETEC AMPLI PROBE: HCPCS | Mod: 90 | Performed by: PATHOLOGY

## 2022-02-06 LAB — SARS-COV-2 RNA RESP QL NAA+PROBE: NEGATIVE

## 2022-02-09 ENCOUNTER — HOSPITAL ENCOUNTER (OUTPATIENT)
Facility: CLINIC | Age: 42
Discharge: HOME | End: 2022-02-09
Attending: INTERNAL MEDICINE | Admitting: INTERNAL MEDICINE
Payer: COMMERCIAL

## 2022-02-09 VITALS
RESPIRATION RATE: 10 BRPM | BODY MASS INDEX: 27.24 KG/M2 | DIASTOLIC BLOOD PRESSURE: 79 MMHG | HEART RATE: 55 BPM | WEIGHT: 189 LBS | OXYGEN SATURATION: 100 % | SYSTOLIC BLOOD PRESSURE: 136 MMHG

## 2022-02-09 DIAGNOSIS — Z12.11 SPECIAL SCREENING FOR MALIGNANT NEOPLASMS, COLON: Primary | ICD-10-CM

## 2022-02-09 LAB
COLONOSCOPY: NORMAL
UPPER GI ENDOSCOPY: NORMAL

## 2022-02-09 PROCEDURE — 43239 EGD BIOPSY SINGLE/MULTIPLE: CPT | Performed by: INTERNAL MEDICINE

## 2022-02-09 PROCEDURE — 99153 MOD SED SAME PHYS/QHP EA: CPT | Performed by: INTERNAL MEDICINE

## 2022-02-09 PROCEDURE — 250N000011 HC RX IP 250 OP 636: Performed by: INTERNAL MEDICINE

## 2022-02-09 PROCEDURE — 88305 TISSUE EXAM BY PATHOLOGIST: CPT | Mod: 26 | Performed by: PATHOLOGY

## 2022-02-09 PROCEDURE — 88305 TISSUE EXAM BY PATHOLOGIST: CPT | Mod: TC | Performed by: INTERNAL MEDICINE

## 2022-02-09 PROCEDURE — 45385 COLONOSCOPY W/LESION REMOVAL: CPT | Performed by: INTERNAL MEDICINE

## 2022-02-09 PROCEDURE — 250N000009 HC RX 250: Performed by: INTERNAL MEDICINE

## 2022-02-09 PROCEDURE — G0500 MOD SEDAT ENDO SERVICE >5YRS: HCPCS | Performed by: INTERNAL MEDICINE

## 2022-02-09 RX ORDER — FENTANYL CITRATE 50 UG/ML
INJECTION, SOLUTION INTRAMUSCULAR; INTRAVENOUS PRN
Status: COMPLETED | OUTPATIENT
Start: 2022-02-09 | End: 2022-02-09

## 2022-02-09 RX ORDER — LIDOCAINE 40 MG/G
CREAM TOPICAL
Status: DISCONTINUED | OUTPATIENT
Start: 2022-02-09 | End: 2022-02-09 | Stop reason: HOSPADM

## 2022-02-09 RX ORDER — METHOCARBAMOL 750 MG/1
750 TABLET, FILM COATED ORAL PRN
Status: ON HOLD | COMMUNITY
Start: 2022-01-21 | End: 2023-02-02

## 2022-02-09 RX ORDER — ONDANSETRON 2 MG/ML
4 INJECTION INTRAMUSCULAR; INTRAVENOUS
Status: DISCONTINUED | OUTPATIENT
Start: 2022-02-09 | End: 2022-02-09 | Stop reason: HOSPADM

## 2022-02-09 RX ADMIN — TOPICAL ANESTHETIC 1 SPRAY: 200 SPRAY DENTAL; PERIODONTAL at 15:13

## 2022-02-09 RX ADMIN — FENTANYL CITRATE 50 MCG: 50 INJECTION, SOLUTION INTRAMUSCULAR; INTRAVENOUS at 15:16

## 2022-02-09 RX ADMIN — FENTANYL CITRATE 50 MCG: 50 INJECTION, SOLUTION INTRAMUSCULAR; INTRAVENOUS at 15:35

## 2022-02-09 RX ADMIN — MIDAZOLAM 1 MG: 1 INJECTION INTRAMUSCULAR; INTRAVENOUS at 15:15

## 2022-02-09 RX ADMIN — MIDAZOLAM 1 MG: 1 INJECTION INTRAMUSCULAR; INTRAVENOUS at 15:13

## 2022-02-09 RX ADMIN — MIDAZOLAM 1 MG: 1 INJECTION INTRAMUSCULAR; INTRAVENOUS at 15:27

## 2022-02-09 RX ADMIN — FENTANYL CITRATE 50 MCG: 50 INJECTION, SOLUTION INTRAMUSCULAR; INTRAVENOUS at 15:14

## 2022-02-09 RX ADMIN — FENTANYL CITRATE 50 MCG: 50 INJECTION, SOLUTION INTRAMUSCULAR; INTRAVENOUS at 15:26

## 2022-02-09 RX ADMIN — MIDAZOLAM 1 MG: 1 INJECTION INTRAMUSCULAR; INTRAVENOUS at 15:34

## 2022-02-09 RX ADMIN — FENTANYL CITRATE 100 MCG: 50 INJECTION, SOLUTION INTRAMUSCULAR; INTRAVENOUS at 15:11

## 2022-02-09 RX ADMIN — MIDAZOLAM 2 MG: 1 INJECTION INTRAMUSCULAR; INTRAVENOUS at 15:12

## 2022-02-09 NOTE — H&P
ENDOSCOPY PRE-SEDATION H&P FOR OUTPATIENT PROCEDURES    Yobani Baldwin  [id number]  1980    Procedure: Colonoscopy Endoscopy    Pre-procedure diagnosis: Melena, heartburn    Past medical history:   Past Medical History:   Diagnosis Date     Back pain      Patient Active Problem List   Diagnosis     Allergic rhinitis     CARDIOVASCULAR SCREENING; LDL GOAL LESS THAN 160     Olecranon bursitis     Major depression in remission (H)     Family hx of prostate cancer       Past surgical history: History reviewed. No pertinent surgical history.    Current Facility-Administered Medications   Medication     lidocaine (LMX4) cream     lidocaine 1 % 0.1-1 mL     ondansetron (ZOFRAN) injection 4 mg     sodium chloride (PF) 0.9% PF flush 3 mL     sodium chloride (PF) 0.9% PF flush 3 mL       No Known Allergies        Physical Exam:    Mental status: alert  Heart: Normal  Lung: Normal  Assessment of patient's airway: Normal  Other as pertinent for procedure: None     Lab Results   Component Value Date    WBC 9.4 01/25/2022    WBC 8.8 03/17/2015     Lab Results   Component Value Date    RBC 5.06 01/25/2022    RBC 5.50 03/17/2015     Lab Results   Component Value Date    HGB 14.7 01/25/2022    HGB 15.9 03/17/2015     Lab Results   Component Value Date    HCT 44.7 01/25/2022    HCT 48.8 03/17/2015     Lab Results   Component Value Date    MCV 88 01/25/2022    MCV 89 03/17/2015     Lab Results   Component Value Date    MCH 29.1 01/25/2022    MCH 28.9 03/17/2015     Lab Results   Component Value Date    MCHC 32.9 01/25/2022    MCHC 32.6 03/17/2015     Lab Results   Component Value Date    RDW 14.1 01/25/2022    RDW 13.5 03/17/2015     Lab Results   Component Value Date     01/25/2022     03/17/2015     No results found for: INR     ASA Score: See Provation note    Mallampati score:  I - Faucial pillars, soft palate, and uvula are visible    Assessment/Plan:     The patient is an appropriate candidate to receive  sedation.    Informed consent was discussed with the patient/family, including the risks, benefits, potential complications and any alternative options associated with sedation.    Patient assessment completed just prior to sedation and while under constant observation by the provider. Condition determined to be adequate for proceeding with sedation.    The specific risks for the procedure were discussed with the patient at the time of informed consent and include but are not limited to perforation which could require surgery, missing significant neoplasm or lesion, hemorrhage and adverse sedative complication.      Cory Grande MD

## 2022-02-11 LAB
PATH REPORT.COMMENTS IMP SPEC: NORMAL
PATH REPORT.COMMENTS IMP SPEC: NORMAL
PATH REPORT.FINAL DX SPEC: NORMAL
PATH REPORT.GROSS SPEC: NORMAL
PATH REPORT.MICROSCOPIC SPEC OTHER STN: NORMAL
PATH REPORT.RELEVANT HX SPEC: NORMAL
PHOTO IMAGE: NORMAL

## 2022-03-30 ENCOUNTER — TRANSFERRED RECORDS (OUTPATIENT)
Dept: HEALTH INFORMATION MANAGEMENT | Facility: CLINIC | Age: 42
End: 2022-03-30
Payer: COMMERCIAL

## 2022-04-12 NOTE — TELEPHONE ENCOUNTER
DIAGNOSIS: LBP fusion consult, 1st opinion done at [facility], MRI and Xrays at [facility]   APPOINTMENT DATE: 4.13.22   NOTES STATUS DETAILS   OFFICE NOTE from referring provider     OFFICE NOTE from other specialist     DISCHARGE SUMMARY from hospital     DISCHARGE REPORT from the ER     OPERATIVE REPORT     MEDICATION LIST Internal    EMG (for Spine)     IMPLANT RECORD/STICKER     LABS     CBC/DIFF Internal    CULTURES     INJECTIONS DONE IN RADIOLOGY Received 1.17.22   MRI Received 3.30.22 lumbar  3.17.21 lumbar   CT SCAN     XRAYS (IMAGES & REPORTS) Received 3.30.22 lumbar   TUMOR     PATHOLOGY  Slides & report       Action 4.12.22 MJ   Action Taken Called [facility] and transferred to Amy . WENDI.  Sent urgent request to [facility]     Action 4.13.22 MJ   Action Taken Received report from [facility] Sent to urgent scanning.      Action 4.14.22 MJ   Action Taken Received phone call from Amy at [place]. Stated there are zero medical records.      Action 4.19.22 MJ   Action Taken Called pt to see where else he has been seen. MISTY

## 2022-04-13 ENCOUNTER — PRE VISIT (OUTPATIENT)
Dept: ORTHOPEDICS | Facility: CLINIC | Age: 42
End: 2022-04-13
Payer: COMMERCIAL

## 2022-04-13 DIAGNOSIS — M54.50 LOW BACK PAIN: Primary | ICD-10-CM

## 2022-04-20 ENCOUNTER — TELEPHONE (OUTPATIENT)
Dept: ORTHOPEDICS | Facility: CLINIC | Age: 42
End: 2022-04-20
Payer: COMMERCIAL

## 2022-04-20 ENCOUNTER — OFFICE VISIT (OUTPATIENT)
Dept: ORTHOPEDICS | Facility: CLINIC | Age: 42
End: 2022-04-20
Payer: COMMERCIAL

## 2022-04-20 VITALS — BODY MASS INDEX: 27.16 KG/M2 | WEIGHT: 194 LBS | HEIGHT: 71 IN

## 2022-04-20 DIAGNOSIS — M43.10 SPONDYLOLISTHESIS, GRADE 1: Primary | ICD-10-CM

## 2022-04-20 DIAGNOSIS — M54.16 LUMBAR RADICULOPATHY: ICD-10-CM

## 2022-04-20 PROCEDURE — 99205 OFFICE O/P NEW HI 60 MIN: CPT | Mod: GC | Performed by: ORTHOPAEDIC SURGERY

## 2022-04-20 NOTE — LETTER
"[employer]  [facility]  [address] , [address]  [place] [address]  Fax: [phone]  Phone: [phone]      2022      Yobani Baldwin  [address] [facility] [address]        Dear Yobani Baldwin,    Thank you for your interest in becoming a [employer] user!    Your access code is: [email or web address]  Expires: 2022  5:24 AM     Please access the [employer] website at [email or web address].  Below the ID and password fields, select the  Sign Up Now  as New User.  You will be prompted to enter the access code listed above as well as additional personal information.  Please follow the directions carefully when creating your username and password.    If you allow your access code to , or if you have any questions please call a [employer] Representative at [phone] during normal clinic hours.     Sincerely,      "[employer]  [facility]
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
## 2022-04-20 NOTE — TELEPHONE ENCOUNTER
Called patient and offered him an appointment with Dr. Zheng.  Patient declined the appointment and stated that he was fine and thanked me for the call.
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
## 2022-04-20 NOTE — TELEPHONE ENCOUNTER
[facility] Call Center    Phone Message    May a detailed message be left on voicemail: yes     Reason for Call: Other: Patient would like a call back to discuss some further surgery questions that didn't come up during his visit this morning.     Action Taken: Message routed to:  [facility] ([facility] Orthopedics    Travel Screening: Not Applicable

## 2022-04-21 PROBLEM — M54.16 LUMBAR RADICULOPATHY: Status: ACTIVE | Noted: 2022-04-21

## 2022-04-21 PROBLEM — M43.10 SPONDYLOLISTHESIS, GRADE 1: Status: ACTIVE | Noted: 2022-04-21

## 2022-04-25 NOTE — TELEPHONE ENCOUNTER
DIAGNOSIS: 3rd opinion on LBP , per pt *prev seen by [place] & 1st opinion done by [place]   APPOINTMENT DATE: 4/26/22   NOTES STATUS DETAILS   OFFICE NOTE from referring provider Internal  Ov 4/20/22- Cristo Zheng MD- [facility]   LABS     CBC/DIFF Internal CBC done 1/25/22   MRI Internal/received MR Lumbar Spine 3/30/22- [facility]  MR Hip 9/30/21- [facility]  MR Cervical Spine 3/17/21- [facility]     XRAYS (IMAGES & REPORTS) Internal/received  XR Lumbar Spine 4/20/22- Internal  XR Lumbar Spine 3/30/22- [facility]  XR Pelvis/hip 8/23/21

## 2022-04-26 ENCOUNTER — PRE VISIT (OUTPATIENT)
Dept: ORTHOPEDICS | Facility: CLINIC | Age: 42
End: 2022-04-26
Payer: COMMERCIAL

## 2022-04-26 ENCOUNTER — OFFICE VISIT (OUTPATIENT)
Dept: ORTHOPEDICS | Facility: CLINIC | Age: 42
End: 2022-04-26
Payer: COMMERCIAL

## 2022-04-26 VITALS — BODY MASS INDEX: 26.28 KG/M2 | WEIGHT: 194 LBS | HEIGHT: 72 IN

## 2022-04-26 DIAGNOSIS — M54.16 LUMBAR RADICULOPATHY: Primary | ICD-10-CM

## 2022-04-26 PROCEDURE — 99214 OFFICE O/P EST MOD 30 MIN: CPT | Mod: GC | Performed by: ORTHOPAEDIC SURGERY
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
## 2022-05-06 ENCOUNTER — TELEPHONE (OUTPATIENT)
Dept: ORTHOPEDICS | Facility: CLINIC | Age: 42
End: 2022-05-06
Payer: COMMERCIAL

## 2022-05-06 NOTE — TELEPHONE ENCOUNTER
RN called and spoke with patient. Patient expressed desire to proceed with surgery with Dr. Verde.  RN then explain to patient the PA process, PAC appt, Covid 19 testing, potentially other imaging needed pre surgery and patient expressed understanding and stated he would like to do so late summer, early Fall. RN told patient sometimes we can hold a date and advise patient to reach back out to Shannan. Patient expressed understanding.    Alex Stewart RN

## 2022-09-07 ENCOUNTER — TELEPHONE (OUTPATIENT)
Dept: ORTHOPEDICS | Facility: CLINIC | Age: 42
End: 2022-09-07

## 2022-09-07 NOTE — TELEPHONE ENCOUNTER
Returned call to patient regarding scheduling surgery with Dr Verde. I left him my direct number to call back at his convenience. [phone]

## 2022-09-16 ENCOUNTER — TELEPHONE (OUTPATIENT)
Dept: ORTHOPEDICS | Facility: CLINIC | Age: 42
End: 2022-09-16

## 2022-09-16 NOTE — TELEPHONE ENCOUNTER
Patient is scheduled for surgery with Dr. Verde    Spoke with: Patient    Date of Surgery: 10/12/22    Location: [facility]    Post op: 6 weeks    Pre op with Provider: Complete    H&P: Scheduled with PAC 9/23/22    Pre-procedure COVID-19 Test: Will do home test    Additional imaging/appointments: N/A    Surgery packet: Mailed to patient today     Additional comments: N/A

## 2022-09-19 NOTE — TELEPHONE ENCOUNTER
FUTURE VISIT INFORMATION      SURGERY INFORMATION:    Date: 10/12/22    [facility]: [facility]    Surgeon:  Florin Verde MD    Anesthesia Type:  General    Procedure: Right Far Lateral lumbar 4-5 discetomy and midline Lumbar 5 to sacral 1 decompression  RECORDS REQUESTED FROM:        Primary Care Provider: Jaciel Wilde MD- [facility]    Most recent EKG+ Tracing: 10/13/19- [facility]    Most recent ECHO: 3/9/11    Most recent Cardiac Stress Test: 3/9/11

## 2022-09-23 ENCOUNTER — PRE VISIT (OUTPATIENT)
Dept: SURGERY | Facility: CLINIC | Age: 42
End: 2022-09-23

## 2022-09-23 ENCOUNTER — ANESTHESIA EVENT (OUTPATIENT)
Dept: SURGERY | Facility: CLINIC | Age: 42
End: 2022-09-23

## 2022-09-23 NOTE — TELEPHONE ENCOUNTER
Returned call to patient to confirm cancellation. Patient stated he will call back when he knows it will align better with his work schedule.

## 2022-09-23 NOTE — TELEPHONE ENCOUNTER
[facility] Call Center    Phone Message    May a detailed message be left on voicemail: yes     Reason for Call: Other: patient needs to cancel his surgery     Action Taken: Message routed to:  [facility]o    Travel Screening: Not Applicable     10/12 will not work for patient and his work -- he would like to cancel surgery

## 2022-10-04 ENCOUNTER — TELEPHONE (OUTPATIENT)
Dept: ORTHOPEDICS | Facility: CLINIC | Age: 42
End: 2022-10-04

## 2022-10-04 NOTE — TELEPHONE ENCOUNTER
Received call from patient.   Patient stated that he would like to rescheduled but had a couple of clinical questions that wants answered before he is ready to proceed with rescheduling.     Patient was transferred to [facility].

## 2022-12-25 ENCOUNTER — HEALTH MAINTENANCE LETTER (OUTPATIENT)
Age: 42
End: 2022-12-25

## 2023-01-11 ENCOUNTER — TELEPHONE (OUTPATIENT)
Dept: ORTHOPEDICS | Facility: CLINIC | Age: 43
End: 2023-01-11
Payer: COMMERCIAL

## 2023-01-11 NOTE — TELEPHONE ENCOUNTER
Returned call to patient about rescheduling surgery with Dr Verde. I left him my direct number to call back when he is able. [phone]

## 2023-01-17 NOTE — TELEPHONE ENCOUNTER
Received call back from patient, rescheduled surgery with Dr Verde from 10/12/22 to 2/2/23. He will have his H&P with PAC on 1/25/23 and a phone visit with Dr Verde to go over questions on 1/30/23.

## 2023-01-17 NOTE — TELEPHONE ENCOUNTER
FUTURE VISIT INFORMATION      SURGERY INFORMATION:    Date: 2/2/23    Location: [facility]    Surgeon:  Florin Verde MD    Anesthesia Type:  General    Procedure: Right Far Lateral lumbar 4-5 discetomy and midline Lumbar 5 to sacral 1 decompression    RECORDS REQUESTED FROM:          Primary Care Provider: Jaciel Wilde MD- [facility]     Most recent EKG+ Tracing: 10/13/19- [facility]     Most recent ECHO: 3/9/11     Most recent Cardiac Stress Test: 3/9/11

## 2023-01-25 ENCOUNTER — VIRTUAL VISIT (OUTPATIENT)
Dept: SURGERY | Facility: CLINIC | Age: 43
End: 2023-01-25
Payer: COMMERCIAL

## 2023-01-25 ENCOUNTER — PRE VISIT (OUTPATIENT)
Dept: SURGERY | Facility: CLINIC | Age: 43
End: 2023-01-25

## 2023-01-25 ENCOUNTER — ANESTHESIA EVENT (OUTPATIENT)
Dept: SURGERY | Facility: CLINIC | Age: 43
End: 2023-01-25
Payer: COMMERCIAL

## 2023-01-25 ENCOUNTER — TELEPHONE (OUTPATIENT)
Dept: ORTHOPEDICS | Facility: CLINIC | Age: 43
End: 2023-01-25

## 2023-01-25 DIAGNOSIS — Z01.818 PREOP EXAMINATION: Primary | ICD-10-CM

## 2023-01-25 PROCEDURE — 99203 OFFICE O/P NEW LOW 30 MIN: CPT | Mod: TEL | Performed by: PHYSICIAN ASSISTANT

## 2023-01-25 ASSESSMENT — ENCOUNTER SYMPTOMS: SEIZURES: 0

## 2023-01-25 NOTE — TELEPHONE ENCOUNTER
RN called and spoke with and verified patient does want to proceed with surgery with Dr. Verde next Thursday.  RN then informed the Neuro/spine Prior Auth team via e-mail.    Alex Stewart RN

## 2023-01-25 NOTE — PROGRESS NOTES
Yobani is a 42 year old who is being evaluated via a billable video visit.      How would you like to obtain your AVS? Araseli Mayfield   Yobani is a 42 year old; presenting for the following health issues:  Pre-Op Exam (/)      HPI         Review of Systems       Physical Exam       GABY Cortes LPN

## 2023-01-25 NOTE — PATIENT INSTRUCTIONS
Preparing for Your Surgery      Name:  Yobani Baldwin   MRN:  [id number]   :  1980   Today's Date:  2023       Arriving for surgery:  Surgery date:  23  Arrival time:  8:20AM     Surgeries and procedures: Adult patients can have 2 visitors all through the surgery process.     Visiting hours: 8 a.m. to 8:30 p.m.     Hospital: Adult patients and children under age 18 can have 4 visitor at a time     No visitors under the age of 5 are allowed for hospital patients.  Double occupancy rooms: Patients can have only two visitors at a time.     Patients with disabilities: Can have a support person with them (family member, service provider     Or someone well informed about their needs) plus the allowed number of visitors     Patients confirmed or suspected to have symptoms of COVID 19 or flu:     No visitors allowed for adult patients.   Children (under age 18) can have 1 named visitor.     People who are sick or showing symptoms of COVID 19 or flu:    Are not allowed to visit patients--we can only make exceptions in special situations.       Please follow these guidelines for your visit:   Arrive wearing a mask over your mouth and nose; we will give you a medical mask to wear    If you arrive wearing a cloth mask.   Keep it on during your entire visit, even when in patient's room.   If you don't wear a mask we'll ask you to leave.     Clean your hands with alcohol hand . Do this when you arrive at and leave the building and patient room,    And again after you touch your mask or anything in the room.     You can t visit if you have a fever, cough, shortness of breath, muscle aches, headaches, sore throat    Or diarrhea      Stay 6 feet away from others during your visit and between visits     Go directly to and from the room you are visiting.     Stay in the patient s room during your visit. Limit going to other places in the hospital as much as possible     Leave bags and jackets at home  or in the car.     For everyone s health, please don t come and go during your visit. That includes for smoking   during your visit.     Please come to:     [facility] [address]  [address] [place]  [address]    - parking is available in front of [facility] from 5:15AM to 8:00PM. If you prefer, park your car in the Green Lot.    -Proceed to the 3rd floor, check in at the Adult Surgery Waiting Lounge. [phone]    If an escort is needed stop at the Information Desk in the lobby. Inform the information person that you are here for surgery. An escort to the Adult Surgery Waiting Lounge will be provided.    What can I eat or drink?  -  You may eat and drink normally up to 8 hours prior to arrival time. (Until Midnight)  -  You may have clear liquids until 2 hours prior to arrival time. (Until 2/2/23, 6:20AM)    Examples of clear liquids:  Water  Clear broth  Juices (apple, white grape, white cranberry  and cider) without pulp  Noncarbonated, powder based beverages  (lemonade and Demond-Aid)  Sodas (Sprite, 7-Up, ginger ale and seltzer)  Coffee or tea (without milk or cream)  Gatorade    -  No Alcohol for at least 24 hours before surgery.     Which medicines can I take?    Hold Aspirin for 7 days before surgery.   Hold Multivitamins for 7 days before surgery.  Hold Supplements for 7 days before surgery.  Hold Ibuprofen (Advil, Motrin) for 1 day before surgery--unless otherwise directed by surgeon.  Hold Naproxen (Aleve) for 4 days before surgery.      -  PLEASE TAKE these medications the day of surgery:    Methocarbamol(Robaxin) as needed    How do I prepare myself?  - Please take 2 showers before surgery using Scrubcare or Hibiclens soap.    Use this soap only from the neck to your toes.     Leave the soap on your skin for one minute--then rinse thoroughly.      You may use your own shampoo and conditioner. No other hair products.    - Please remove all jewelry and body piercings.  - No lotions, deodorants or fragrance.   - Bring your ID and insurance card.    -If you have a Deep Brain Stimulator, Spinal Cord Stimulator, or any Neuro Stimulator device---you must bring the remote control to the hospital.      Covid testing policy as of 12/06/2022  Your surgeon will notify and schedule you for a COVID test if one is needed before surgery--please direct any questions or COVID symptoms to your surgeon      Questions or Concerns:    - For any questions regarding the day of surgery or your hospital stay, please contact the Pre Admission Nursing Office at [phone].       - If you have health changes between today and your surgery, please call your surgeon.       - For questions after surgery, please call your surgeons office.
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
## 2023-01-26 ENCOUNTER — TELEPHONE (OUTPATIENT)
Dept: ORTHOPEDICS | Facility: CLINIC | Age: 43
End: 2023-01-26
Payer: COMMERCIAL

## 2023-01-26 NOTE — TELEPHONE ENCOUNTER
RN received call from patient. Returned call to patient.  Patient has new insurance, [employer].   RN told patient to call our main clinic line so a representative can assist him with updating his insurance information. Of note, he has upcoming surgery with Dr. Verde next week but RN wanted to play it safe so RN advised patient to call Mary with this new information.    Alex Stewart RN

## 2023-01-30 ENCOUNTER — VIRTUAL VISIT (OUTPATIENT)
Dept: ORTHOPEDICS | Facility: CLINIC | Age: 43
End: 2023-01-30
Payer: COMMERCIAL

## 2023-01-30 DIAGNOSIS — M54.16 LUMBAR RADICULOPATHY: Primary | ICD-10-CM

## 2023-01-30 PROCEDURE — 99214 OFFICE O/P EST MOD 30 MIN: CPT | Mod: 95 | Performed by: ORTHOPAEDIC SURGERY

## 2023-01-30 NOTE — NURSING NOTE
Yobani is a 42 year old who is being evaluated via a billable telephone visit.      What phone number would you like to be contacted at? [phone]   How would you like to obtain your AVS? Araseli

## 2023-01-30 NOTE — LETTER
1/30/2023       RE: Yobani Baldwin  [address]  [facility] [address]      Dear Colleague,    Thank you for referring your patient, Yobani Baldwin, to the [facility] [place]. Please see a copy of my visit note below.    Yobani is a 42 year old who is being evaluated via a billable telephone visit.      What phone number would you like to be contacted at? Home  How would you like to obtain your AVS? MyChart    Distant Location (provider location):  Off-site  Phone call duration: 14 minutes    Diagnosis: Lumbar Stenosis    I called and spoke with Yobani.  He saw me last year with radicular leg symptoms inboth right L4 and bilateral S1 distributions.  He had a far lateral L4-5 herniation and bilateral latera recess stenosis at 5-1.  We planned  Decompressions in both areas.  I went over the risks and benefits and he wishes to proceed.    Risks of this surgery include risk of infection, risk of dural tear resulting in CSF leak which might result in headaches, or possible need for lumbar drain, or possible revision surgery in the setting of a persistent leak. Risk of seroma or hematoma requiring revision surgery. Possible nerve root injury resulting in numbness weakness or paralysis into the leg. Possible radiculitis which could result in similar symptoms or could result in significant neurogenic type pain into the leg. Risk of incomplete decompression which might require revision surgery in the future.  Risk of pars fracture or postoperative instability requiring conversion to a fusion in the future. Risk of adjacent segment problems requiring surgery in the future. Risk of incomplete relief of symptoms possibly requiring revision surgery in the future. There is a risk of blood clots in the legs or the lungs.  Furthermore, although rare, there are risks of major vessel or major organ injury from the surgery, and risks of the anesthetic including stroke heart  attack and death.      Florin Verde MD

## 2023-01-30 NOTE — PROGRESS NOTES
Yobani is a 42 year old who is being evaluated via a billable telephone visit.      What phone number would you like to be contacted at? Home  How would you like to obtain your AVS? Araseli    Distant Location (provider location):  Off-site  Phone call duration: 14 minutes    Diagnosis: Lumbar Stenosis    I called and spoke with Yobani.  He saw me last year with radicular leg symptoms inboth right L4 and bilateral S1 distributions.  He had a far lateral L4-5 herniation and bilateral latera recess stenosis at 5-1.  We planned  Decompressions in both areas.  I went over the risks and benefits and he wishes to proceed.    Risks of this surgery include risk of infection, risk of dural tear resulting in CSF leak which might result in headaches, or possible need for lumbar drain, or possible revision surgery in the setting of a persistent leak. Risk of seroma or hematoma requiring revision surgery. Possible nerve root injury resulting in numbness weakness or paralysis into the leg. Possible radiculitis which could result in similar symptoms or could result in significant neurogenic type pain into the leg. Risk of incomplete decompression which might require revision surgery in the future.  Risk of pars fracture or postoperative instability requiring conversion to a fusion in the future. Risk of adjacent segment problems requiring surgery in the future. Risk of incomplete relief of symptoms possibly requiring revision surgery in the future. There is a risk of blood clots in the legs or the lungs.  Furthermore, although rare, there are risks of major vessel or major organ injury from the surgery, and risks of the anesthetic including stroke heart attack and death.

## 2023-02-02 ENCOUNTER — APPOINTMENT (OUTPATIENT)
Dept: GENERAL RADIOLOGY | Facility: CLINIC | Age: 43
End: 2023-02-02
Attending: ORTHOPAEDIC SURGERY
Payer: COMMERCIAL

## 2023-02-02 ENCOUNTER — ANESTHESIA (OUTPATIENT)
Dept: SURGERY | Facility: CLINIC | Age: 43
End: 2023-02-02
Payer: COMMERCIAL

## 2023-02-02 ENCOUNTER — HOSPITAL ENCOUNTER (OUTPATIENT)
Facility: CLINIC | Age: 43
Discharge: HOME | End: 2023-02-04
Attending: ORTHOPAEDIC SURGERY | Admitting: ORTHOPAEDIC SURGERY
Payer: COMMERCIAL

## 2023-02-02 DIAGNOSIS — M54.16 LUMBAR RADICULOPATHY: Primary | ICD-10-CM

## 2023-02-02 DIAGNOSIS — Z98.890 S/P LUMBAR DISCECTOMY: ICD-10-CM

## 2023-02-02 LAB
ANION GAP SERPL CALCULATED.3IONS-SCNC: 5 MMOL/L (ref 3–14)
BUN SERPL-MCNC: 13 MG/DL (ref 7–30)
CALCIUM SERPL-MCNC: 9 MG/DL (ref 8.5–10.1)
CHLORIDE BLD-SCNC: 111 MMOL/L (ref 94–109)
CO2 SERPL-SCNC: 22 MMOL/L (ref 20–32)
CREAT SERPL-MCNC: 0.94 MG/DL (ref 0.66–1.25)
ERYTHROCYTE [DISTWIDTH] IN BLOOD BY AUTOMATED COUNT: 13.3 % (ref 10–15)
GFR SERPL CREATININE-BSD FRML MDRD: >90 ML/MIN/1.73M2
GLUCOSE BLD-MCNC: 82 MG/DL (ref 70–99)
HCT VFR BLD AUTO: 48.5 % (ref 40–53)
HGB BLD-MCNC: 15.5 G/DL (ref 13.3–17.7)
MCH RBC QN AUTO: 28.8 PG (ref 26.5–33)
MCHC RBC AUTO-ENTMCNC: 32 G/DL (ref 31.5–36.5)
MCV RBC AUTO: 90 FL (ref 78–100)
PLATELET # BLD AUTO: 265 10E3/UL (ref 150–450)
POTASSIUM BLD-SCNC: 3.8 MMOL/L (ref 3.4–5.3)
RBC # BLD AUTO: 5.38 10E6/UL (ref 4.4–5.9)
SODIUM SERPL-SCNC: 138 MMOL/L (ref 133–144)
WBC # BLD AUTO: 16.5 10E3/UL (ref 4–11)

## 2023-02-02 PROCEDURE — 250N000011 HC RX IP 250 OP 636: Performed by: INTERNAL MEDICINE

## 2023-02-02 PROCEDURE — 250N000013 HC RX MED GY IP 250 OP 250 PS 637: Performed by: PHYSICIAN ASSISTANT

## 2023-02-02 PROCEDURE — 99222 1ST HOSP IP/OBS MODERATE 55: CPT | Performed by: INTERNAL MEDICINE

## 2023-02-02 PROCEDURE — 93010 ELECTROCARDIOGRAM REPORT: CPT | Performed by: INTERNAL MEDICINE

## 2023-02-02 PROCEDURE — 63047 LAM FACETEC & FORAMOT LUMBAR: CPT | Mod: GC | Performed by: ORTHOPAEDIC SURGERY

## 2023-02-02 PROCEDURE — 250N000011 HC RX IP 250 OP 636: Performed by: PHYSICIAN ASSISTANT

## 2023-02-02 PROCEDURE — 272N000004 HC RX 272: Performed by: ORTHOPAEDIC SURGERY

## 2023-02-02 PROCEDURE — 250N000025 HC SEVOFLURANE, PER MIN: Performed by: ORTHOPAEDIC SURGERY

## 2023-02-02 PROCEDURE — 85027 COMPLETE CBC AUTOMATED: CPT | Performed by: PHYSICIAN ASSISTANT

## 2023-02-02 PROCEDURE — 93005 ELECTROCARDIOGRAM TRACING: CPT

## 2023-02-02 PROCEDURE — 999N000063 XR CROSSTABLE LATERAL LUMBAR SPINE PORTABLE

## 2023-02-02 PROCEDURE — 72020 X-RAY EXAM OF SPINE 1 VIEW: CPT | Mod: 26 | Performed by: STUDENT IN AN ORGANIZED HEALTH CARE EDUCATION/TRAINING PROGRAM

## 2023-02-02 PROCEDURE — 999N000141 HC STATISTIC PRE-PROCEDURE NURSING ASSESSMENT: Performed by: ORTHOPAEDIC SURGERY

## 2023-02-02 PROCEDURE — 250N000011 HC RX IP 250 OP 636: Performed by: ANESTHESIOLOGY

## 2023-02-02 PROCEDURE — 250N000009 HC RX 250: Performed by: NURSE ANESTHETIST, CERTIFIED REGISTERED

## 2023-02-02 PROCEDURE — 258N000003 HC RX IP 258 OP 636: Performed by: NURSE ANESTHETIST, CERTIFIED REGISTERED

## 2023-02-02 PROCEDURE — 272N000001 HC OR GENERAL SUPPLY STERILE: Performed by: ORTHOPAEDIC SURGERY

## 2023-02-02 PROCEDURE — 370N000017 HC ANESTHESIA TECHNICAL FEE, PER MIN: Performed by: ORTHOPAEDIC SURGERY

## 2023-02-02 PROCEDURE — 360N000084 HC SURGERY LEVEL 4 W/ FLUORO, PER MIN: Performed by: ORTHOPAEDIC SURGERY

## 2023-02-02 PROCEDURE — 250N000013 HC RX MED GY IP 250 OP 250 PS 637: Performed by: ANESTHESIOLOGY

## 2023-02-02 PROCEDURE — 710N000010 HC RECOVERY PHASE 1, LEVEL 2, PER MIN: Performed by: ORTHOPAEDIC SURGERY

## 2023-02-02 PROCEDURE — 63056 DECOMPRESS SPINAL CORD LMBR: CPT | Mod: GC | Performed by: ORTHOPAEDIC SURGERY

## 2023-02-02 PROCEDURE — 250N000009 HC RX 250: Performed by: ORTHOPAEDIC SURGERY

## 2023-02-02 PROCEDURE — 80048 BASIC METABOLIC PNL TOTAL CA: CPT | Performed by: PHYSICIAN ASSISTANT

## 2023-02-02 PROCEDURE — 63048 LAM FACETEC &FORAMOT EA ADDL: CPT | Mod: GC | Performed by: ORTHOPAEDIC SURGERY

## 2023-02-02 PROCEDURE — 250N000011 HC RX IP 250 OP 636: Performed by: NURSE ANESTHETIST, CERTIFIED REGISTERED

## 2023-02-02 RX ORDER — NALOXONE HYDROCHLORIDE 0.4 MG/ML
0.2 INJECTION, SOLUTION INTRAMUSCULAR; INTRAVENOUS; SUBCUTANEOUS
Status: DISCONTINUED | OUTPATIENT
Start: 2023-02-02 | End: 2023-02-04 | Stop reason: HOSPADM

## 2023-02-02 RX ORDER — NALOXONE HYDROCHLORIDE 0.4 MG/ML
0.4 INJECTION, SOLUTION INTRAMUSCULAR; INTRAVENOUS; SUBCUTANEOUS
Status: DISCONTINUED | OUTPATIENT
Start: 2023-02-02 | End: 2023-02-02 | Stop reason: HOSPADM

## 2023-02-02 RX ORDER — HYDROMORPHONE HYDROCHLORIDE 1 MG/ML
0.2 INJECTION, SOLUTION INTRAMUSCULAR; INTRAVENOUS; SUBCUTANEOUS
Status: DISCONTINUED | OUTPATIENT
Start: 2023-02-02 | End: 2023-02-04 | Stop reason: HOSPADM

## 2023-02-02 RX ORDER — PROPOFOL 10 MG/ML
INJECTION, EMULSION INTRAVENOUS PRN
Status: DISCONTINUED | OUTPATIENT
Start: 2023-02-02 | End: 2023-02-02

## 2023-02-02 RX ORDER — ACETAMINOPHEN 325 MG/1
975 TABLET ORAL EVERY 8 HOURS
Status: DISCONTINUED | OUTPATIENT
Start: 2023-02-02 | End: 2023-02-04 | Stop reason: HOSPADM

## 2023-02-02 RX ORDER — METHOCARBAMOL 750 MG/1
750 TABLET, FILM COATED ORAL EVERY 6 HOURS PRN
Qty: 60 TABLET | Refills: 0 | Status: SHIPPED | OUTPATIENT
Start: 2023-02-02 | End: 2023-08-15

## 2023-02-02 RX ORDER — ACETAMINOPHEN 325 MG/1
650 TABLET ORAL EVERY 4 HOURS PRN
Qty: 50 TABLET | Refills: 0 | Status: SHIPPED | OUTPATIENT
Start: 2023-02-02

## 2023-02-02 RX ORDER — CEFAZOLIN SODIUM 2 G/100ML
2 INJECTION, SOLUTION INTRAVENOUS EVERY 8 HOURS
Status: DISCONTINUED | OUTPATIENT
Start: 2023-02-02 | End: 2023-02-04 | Stop reason: HOSPADM

## 2023-02-02 RX ORDER — ONDANSETRON 4 MG/1
4 TABLET, ORALLY DISINTEGRATING ORAL EVERY 6 HOURS PRN
Status: DISCONTINUED | OUTPATIENT
Start: 2023-02-02 | End: 2023-02-04 | Stop reason: HOSPADM

## 2023-02-02 RX ORDER — HYDROMORPHONE HYDROCHLORIDE 1 MG/ML
0.2 INJECTION, SOLUTION INTRAMUSCULAR; INTRAVENOUS; SUBCUTANEOUS EVERY 5 MIN PRN
Status: DISCONTINUED | OUTPATIENT
Start: 2023-02-02 | End: 2023-02-02 | Stop reason: HOSPADM

## 2023-02-02 RX ORDER — FENTANYL CITRATE 50 UG/ML
25 INJECTION, SOLUTION INTRAMUSCULAR; INTRAVENOUS EVERY 5 MIN PRN
Status: DISCONTINUED | OUTPATIENT
Start: 2023-02-02 | End: 2023-02-02 | Stop reason: HOSPADM

## 2023-02-02 RX ORDER — ACETAMINOPHEN 325 MG/1
975 TABLET ORAL ONCE
Status: COMPLETED | OUTPATIENT
Start: 2023-02-02 | End: 2023-02-02

## 2023-02-02 RX ORDER — OXYCODONE HYDROCHLORIDE 5 MG/1
5 TABLET ORAL EVERY 6 HOURS PRN
Qty: 26 TABLET | Refills: 0 | Status: SHIPPED | OUTPATIENT
Start: 2023-02-02 | End: 2023-02-20

## 2023-02-02 RX ORDER — ONDANSETRON 2 MG/ML
4 INJECTION INTRAMUSCULAR; INTRAVENOUS EVERY 6 HOURS PRN
Status: DISCONTINUED | OUTPATIENT
Start: 2023-02-02 | End: 2023-02-04 | Stop reason: HOSPADM

## 2023-02-02 RX ORDER — DIAZEPAM 10 MG/2ML
2.5 INJECTION, SOLUTION INTRAMUSCULAR; INTRAVENOUS EVERY 6 HOURS PRN
Status: DISCONTINUED | OUTPATIENT
Start: 2023-02-02 | End: 2023-02-02

## 2023-02-02 RX ORDER — BISACODYL 10 MG
10 SUPPOSITORY, RECTAL RECTAL DAILY PRN
Status: DISCONTINUED | OUTPATIENT
Start: 2023-02-02 | End: 2023-02-04 | Stop reason: HOSPADM

## 2023-02-02 RX ORDER — SODIUM CHLORIDE, SODIUM LACTATE, POTASSIUM CHLORIDE, CALCIUM CHLORIDE 600; 310; 30; 20 MG/100ML; MG/100ML; MG/100ML; MG/100ML
INJECTION, SOLUTION INTRAVENOUS CONTINUOUS
Status: DISCONTINUED | OUTPATIENT
Start: 2023-02-02 | End: 2023-02-02 | Stop reason: HOSPADM

## 2023-02-02 RX ORDER — SODIUM CHLORIDE, SODIUM LACTATE, POTASSIUM CHLORIDE, CALCIUM CHLORIDE 600; 310; 30; 20 MG/100ML; MG/100ML; MG/100ML; MG/100ML
INJECTION, SOLUTION INTRAVENOUS CONTINUOUS PRN
Status: DISCONTINUED | OUTPATIENT
Start: 2023-02-02 | End: 2023-02-02

## 2023-02-02 RX ORDER — AMOXICILLIN 250 MG
1 CAPSULE ORAL 2 TIMES DAILY
Status: DISCONTINUED | OUTPATIENT
Start: 2023-02-02 | End: 2023-02-04 | Stop reason: HOSPADM

## 2023-02-02 RX ORDER — ONDANSETRON 4 MG/1
4 TABLET, ORALLY DISINTEGRATING ORAL EVERY 30 MIN PRN
Status: DISCONTINUED | OUTPATIENT
Start: 2023-02-02 | End: 2023-02-02 | Stop reason: HOSPADM

## 2023-02-02 RX ORDER — OXYCODONE HYDROCHLORIDE 5 MG/1
5 TABLET ORAL EVERY 4 HOURS PRN
Qty: 20 TABLET | Refills: 0 | Status: SHIPPED | OUTPATIENT
Start: 2023-02-02 | End: 2023-02-03

## 2023-02-02 RX ORDER — MAGNESIUM SULFATE HEPTAHYDRATE 40 MG/ML
2 INJECTION, SOLUTION INTRAVENOUS ONCE
Status: COMPLETED | OUTPATIENT
Start: 2023-02-02 | End: 2023-02-02

## 2023-02-02 RX ORDER — CALCIUM CARBONATE 500 MG/1
500 TABLET, CHEWABLE ORAL 4 TIMES DAILY PRN
Status: DISCONTINUED | OUTPATIENT
Start: 2023-02-02 | End: 2023-02-04 | Stop reason: HOSPADM

## 2023-02-02 RX ORDER — LIDOCAINE HYDROCHLORIDE 20 MG/ML
INJECTION, SOLUTION INFILTRATION; PERINEURAL PRN
Status: DISCONTINUED | OUTPATIENT
Start: 2023-02-02 | End: 2023-02-02

## 2023-02-02 RX ORDER — AMOXICILLIN 250 MG
1 CAPSULE ORAL DAILY
Qty: 30 TABLET | Refills: 0 | Status: SHIPPED | OUTPATIENT
Start: 2023-02-02

## 2023-02-02 RX ORDER — OXYCODONE HYDROCHLORIDE 10 MG/1
10 TABLET ORAL EVERY 4 HOURS PRN
Status: DISCONTINUED | OUTPATIENT
Start: 2023-02-02 | End: 2023-02-02 | Stop reason: HOSPADM

## 2023-02-02 RX ORDER — HYDROMORPHONE HYDROCHLORIDE 1 MG/ML
0.4 INJECTION, SOLUTION INTRAMUSCULAR; INTRAVENOUS; SUBCUTANEOUS EVERY 5 MIN PRN
Status: DISCONTINUED | OUTPATIENT
Start: 2023-02-02 | End: 2023-02-02 | Stop reason: HOSPADM

## 2023-02-02 RX ORDER — FENTANYL CITRATE 50 UG/ML
INJECTION, SOLUTION INTRAMUSCULAR; INTRAVENOUS PRN
Status: DISCONTINUED | OUTPATIENT
Start: 2023-02-02 | End: 2023-02-02

## 2023-02-02 RX ORDER — ONDANSETRON 2 MG/ML
INJECTION INTRAMUSCULAR; INTRAVENOUS PRN
Status: DISCONTINUED | OUTPATIENT
Start: 2023-02-02 | End: 2023-02-02

## 2023-02-02 RX ORDER — METHOCARBAMOL 500 MG/1
500-1000 TABLET, FILM COATED ORAL 4 TIMES DAILY PRN
COMMUNITY
End: 2023-07-05

## 2023-02-02 RX ORDER — CEFAZOLIN SODIUM/WATER 2 G/20 ML
2 SYRINGE (ML) INTRAVENOUS
Status: COMPLETED | OUTPATIENT
Start: 2023-02-02 | End: 2023-02-02

## 2023-02-02 RX ORDER — OXYCODONE HYDROCHLORIDE 5 MG/1
5 TABLET ORAL EVERY 4 HOURS PRN
Status: DISCONTINUED | OUTPATIENT
Start: 2023-02-02 | End: 2023-02-04 | Stop reason: HOSPADM

## 2023-02-02 RX ORDER — METHOCARBAMOL 750 MG/1
750 TABLET, FILM COATED ORAL EVERY 6 HOURS PRN
Status: DISCONTINUED | OUTPATIENT
Start: 2023-02-02 | End: 2023-02-04 | Stop reason: HOSPADM

## 2023-02-02 RX ORDER — NALOXONE HYDROCHLORIDE 0.4 MG/ML
0.2 INJECTION, SOLUTION INTRAMUSCULAR; INTRAVENOUS; SUBCUTANEOUS
Status: DISCONTINUED | OUTPATIENT
Start: 2023-02-02 | End: 2023-02-02 | Stop reason: HOSPADM

## 2023-02-02 RX ORDER — HYDROMORPHONE HYDROCHLORIDE 1 MG/ML
0.4 INJECTION, SOLUTION INTRAMUSCULAR; INTRAVENOUS; SUBCUTANEOUS
Status: DISCONTINUED | OUTPATIENT
Start: 2023-02-02 | End: 2023-02-04 | Stop reason: HOSPADM

## 2023-02-02 RX ORDER — HYDROXYZINE HYDROCHLORIDE 25 MG/1
25 TABLET, FILM COATED ORAL EVERY 6 HOURS PRN
Qty: 30 TABLET | Refills: 0 | Status: SHIPPED | OUTPATIENT
Start: 2023-02-02 | End: 2023-07-05

## 2023-02-02 RX ORDER — HYDROXYZINE HYDROCHLORIDE 25 MG/1
25 TABLET, FILM COATED ORAL EVERY 6 HOURS PRN
Status: DISCONTINUED | OUTPATIENT
Start: 2023-02-02 | End: 2023-02-04 | Stop reason: HOSPADM

## 2023-02-02 RX ORDER — ACETAMINOPHEN 325 MG/1
650 TABLET ORAL EVERY 4 HOURS PRN
Status: DISCONTINUED | OUTPATIENT
Start: 2023-02-05 | End: 2023-02-04 | Stop reason: HOSPADM

## 2023-02-02 RX ORDER — NALOXONE HYDROCHLORIDE 0.4 MG/ML
0.4 INJECTION, SOLUTION INTRAMUSCULAR; INTRAVENOUS; SUBCUTANEOUS
Status: DISCONTINUED | OUTPATIENT
Start: 2023-02-02 | End: 2023-02-04 | Stop reason: HOSPADM

## 2023-02-02 RX ORDER — LIDOCAINE 40 MG/G
CREAM TOPICAL
Status: DISCONTINUED | OUTPATIENT
Start: 2023-02-02 | End: 2023-02-04 | Stop reason: HOSPADM

## 2023-02-02 RX ORDER — BUPIVACAINE HYDROCHLORIDE AND EPINEPHRINE 2.5; 5 MG/ML; UG/ML
INJECTION, SOLUTION INFILTRATION; PERINEURAL PRN
Status: DISCONTINUED | OUTPATIENT
Start: 2023-02-02 | End: 2023-02-02 | Stop reason: HOSPADM

## 2023-02-02 RX ORDER — ACETAMINOPHEN 500 MG
500-1000 TABLET ORAL EVERY 6 HOURS PRN
Status: ON HOLD | COMMUNITY
End: 2023-02-03

## 2023-02-02 RX ORDER — POLYETHYLENE GLYCOL 3350 17 G/17G
17 POWDER, FOR SOLUTION ORAL DAILY
Status: DISCONTINUED | OUTPATIENT
Start: 2023-02-03 | End: 2023-02-04 | Stop reason: HOSPADM

## 2023-02-02 RX ORDER — LIDOCAINE 40 MG/G
CREAM TOPICAL
Status: DISCONTINUED | OUTPATIENT
Start: 2023-02-02 | End: 2023-02-02 | Stop reason: HOSPADM

## 2023-02-02 RX ORDER — OXYCODONE HYDROCHLORIDE 5 MG/1
5 TABLET ORAL EVERY 4 HOURS PRN
Status: DISCONTINUED | OUTPATIENT
Start: 2023-02-02 | End: 2023-02-02 | Stop reason: HOSPADM

## 2023-02-02 RX ORDER — OXYCODONE HYDROCHLORIDE 10 MG/1
10 TABLET ORAL EVERY 4 HOURS PRN
Status: DISCONTINUED | OUTPATIENT
Start: 2023-02-02 | End: 2023-02-04 | Stop reason: HOSPADM

## 2023-02-02 RX ORDER — DEXAMETHASONE SODIUM PHOSPHATE 4 MG/ML
INJECTION, SOLUTION INTRA-ARTICULAR; INTRALESIONAL; INTRAMUSCULAR; INTRAVENOUS; SOFT TISSUE PRN
Status: DISCONTINUED | OUTPATIENT
Start: 2023-02-02 | End: 2023-02-02

## 2023-02-02 RX ORDER — DEXMEDETOMIDINE HYDROCHLORIDE 4 UG/ML
INJECTION, SOLUTION INTRAVENOUS PRN
Status: DISCONTINUED | OUTPATIENT
Start: 2023-02-02 | End: 2023-02-02

## 2023-02-02 RX ORDER — MAGNESIUM HYDROXIDE 1200 MG/15ML
LIQUID ORAL PRN
Status: DISCONTINUED | OUTPATIENT
Start: 2023-02-02 | End: 2023-02-02 | Stop reason: HOSPADM

## 2023-02-02 RX ORDER — FENTANYL CITRATE 50 UG/ML
50 INJECTION, SOLUTION INTRAMUSCULAR; INTRAVENOUS EVERY 5 MIN PRN
Status: DISCONTINUED | OUTPATIENT
Start: 2023-02-02 | End: 2023-02-02 | Stop reason: HOSPADM

## 2023-02-02 RX ORDER — ONDANSETRON 2 MG/ML
4 INJECTION INTRAMUSCULAR; INTRAVENOUS EVERY 30 MIN PRN
Status: DISCONTINUED | OUTPATIENT
Start: 2023-02-02 | End: 2023-02-02 | Stop reason: HOSPADM

## 2023-02-02 RX ORDER — KETAMINE HYDROCHLORIDE 10 MG/ML
INJECTION INTRAMUSCULAR; INTRAVENOUS PRN
Status: DISCONTINUED | OUTPATIENT
Start: 2023-02-02 | End: 2023-02-02

## 2023-02-02 RX ORDER — CEFAZOLIN SODIUM/WATER 2 G/20 ML
2 SYRINGE (ML) INTRAVENOUS SEE ADMIN INSTRUCTIONS
Status: DISCONTINUED | OUTPATIENT
Start: 2023-02-02 | End: 2023-02-02 | Stop reason: HOSPADM

## 2023-02-02 RX ADMIN — PROCHLORPERAZINE EDISYLATE 5 MG: 5 INJECTION INTRAMUSCULAR; INTRAVENOUS at 14:27

## 2023-02-02 RX ADMIN — Medication 20 MG: at 08:06

## 2023-02-02 RX ADMIN — CEFAZOLIN SODIUM 2 G: 2 INJECTION, SOLUTION INTRAVENOUS at 22:14

## 2023-02-02 RX ADMIN — FENTANYL CITRATE 25 MCG: 50 INJECTION INTRAMUSCULAR; INTRAVENOUS at 10:01

## 2023-02-02 RX ADMIN — Medication 10 MG: at 08:19

## 2023-02-02 RX ADMIN — HYDROMORPHONE HYDROCHLORIDE 0.4 MG: 1 INJECTION, SOLUTION INTRAMUSCULAR; INTRAVENOUS; SUBCUTANEOUS at 10:40

## 2023-02-02 RX ADMIN — HYDROMORPHONE HYDROCHLORIDE 0.4 MG: 1 INJECTION, SOLUTION INTRAMUSCULAR; INTRAVENOUS; SUBCUTANEOUS at 10:33

## 2023-02-02 RX ADMIN — METHOCARBAMOL 750 MG: 750 TABLET ORAL at 19:13

## 2023-02-02 RX ADMIN — Medication 10 MG: at 08:13

## 2023-02-02 RX ADMIN — OXYCODONE HYDROCHLORIDE 10 MG: 10 TABLET ORAL at 15:20

## 2023-02-02 RX ADMIN — SODIUM CHLORIDE, POTASSIUM CHLORIDE, SODIUM LACTATE AND CALCIUM CHLORIDE: 600; 310; 30; 20 INJECTION, SOLUTION INTRAVENOUS at 08:56

## 2023-02-02 RX ADMIN — FENTANYL CITRATE 50 MCG: 50 INJECTION, SOLUTION INTRAMUSCULAR; INTRAVENOUS at 08:06

## 2023-02-02 RX ADMIN — Medication 10 MG: at 09:10

## 2023-02-02 RX ADMIN — PHENYLEPHRINE HYDROCHLORIDE 100 MCG: 10 INJECTION INTRAVENOUS at 08:30

## 2023-02-02 RX ADMIN — Medication 2 G: at 07:23

## 2023-02-02 RX ADMIN — METHOCARBAMOL 750 MG: 750 TABLET ORAL at 11:20

## 2023-02-02 RX ADMIN — PROPOFOL 200 MG: 10 INJECTION, EMULSION INTRAVENOUS at 07:36

## 2023-02-02 RX ADMIN — FENTANYL CITRATE 50 MCG: 50 INJECTION, SOLUTION INTRAMUSCULAR; INTRAVENOUS at 07:32

## 2023-02-02 RX ADMIN — FENTANYL CITRATE 25 MCG: 50 INJECTION INTRAMUSCULAR; INTRAVENOUS at 10:08

## 2023-02-02 RX ADMIN — FENTANYL CITRATE 50 MCG: 50 INJECTION INTRAMUSCULAR; INTRAVENOUS at 10:22

## 2023-02-02 RX ADMIN — DEXAMETHASONE SODIUM PHOSPHATE 4 MG: 4 INJECTION, SOLUTION INTRA-ARTICULAR; INTRALESIONAL; INTRAMUSCULAR; INTRAVENOUS; SOFT TISSUE at 08:13

## 2023-02-02 RX ADMIN — CEFAZOLIN SODIUM 2 G: 2 INJECTION, SOLUTION INTRAVENOUS at 15:20

## 2023-02-02 RX ADMIN — PHENYLEPHRINE HYDROCHLORIDE 100 MCG: 10 INJECTION INTRAVENOUS at 08:55

## 2023-02-02 RX ADMIN — Medication 10 MG: at 09:22

## 2023-02-02 RX ADMIN — DEXMEDETOMIDINE 20 MCG: 100 INJECTION, SOLUTION, CONCENTRATE INTRAVENOUS at 09:13

## 2023-02-02 RX ADMIN — OXYCODONE HYDROCHLORIDE 10 MG: 5 TABLET ORAL at 11:19

## 2023-02-02 RX ADMIN — PHENYLEPHRINE HYDROCHLORIDE 100 MCG: 10 INJECTION INTRAVENOUS at 08:50

## 2023-02-02 RX ADMIN — SODIUM CHLORIDE, POTASSIUM CHLORIDE, SODIUM LACTATE AND CALCIUM CHLORIDE: 600; 310; 30; 20 INJECTION, SOLUTION INTRAVENOUS at 07:23

## 2023-02-02 RX ADMIN — HYDROMORPHONE HYDROCHLORIDE 0.4 MG: 1 INJECTION, SOLUTION INTRAMUSCULAR; INTRAVENOUS; SUBCUTANEOUS at 12:02

## 2023-02-02 RX ADMIN — SUGAMMADEX 400 MG: 100 INJECTION, SOLUTION INTRAVENOUS at 09:24

## 2023-02-02 RX ADMIN — MIDAZOLAM 2 MG: 1 INJECTION INTRAMUSCULAR; INTRAVENOUS at 07:23

## 2023-02-02 RX ADMIN — LIDOCAINE HYDROCHLORIDE 80 MG: 20 INJECTION, SOLUTION INFILTRATION; PERINEURAL at 07:35

## 2023-02-02 RX ADMIN — ACETAMINOPHEN 975 MG: 325 TABLET, FILM COATED ORAL at 12:04

## 2023-02-02 RX ADMIN — SENNOSIDES AND DOCUSATE SODIUM 1 TABLET: 50; 8.6 TABLET ORAL at 19:13

## 2023-02-02 RX ADMIN — PHENYLEPHRINE HYDROCHLORIDE 100 MCG: 10 INJECTION INTRAVENOUS at 08:38

## 2023-02-02 RX ADMIN — MAGNESIUM SULFATE 2 G: 2 INJECTION INTRAVENOUS at 07:50

## 2023-02-02 RX ADMIN — Medication 10 MG: at 08:24

## 2023-02-02 RX ADMIN — DIAZEPAM 2.5 MG: 5 INJECTION, SOLUTION INTRAMUSCULAR; INTRAVENOUS at 12:46

## 2023-02-02 RX ADMIN — Medication 50 MG: at 07:37

## 2023-02-02 RX ADMIN — HYDROMORPHONE HYDROCHLORIDE 0.4 MG: 1 INJECTION, SOLUTION INTRAMUSCULAR; INTRAVENOUS; SUBCUTANEOUS at 11:16

## 2023-02-02 RX ADMIN — Medication 10 MG: at 08:05

## 2023-02-02 RX ADMIN — OXYCODONE HYDROCHLORIDE 10 MG: 10 TABLET ORAL at 19:57

## 2023-02-02 RX ADMIN — ACETAMINOPHEN 975 MG: 325 TABLET ORAL at 07:07

## 2023-02-02 RX ADMIN — FENTANYL CITRATE 50 MCG: 50 INJECTION, SOLUTION INTRAMUSCULAR; INTRAVENOUS at 07:30

## 2023-02-02 RX ADMIN — CALCIUM CARBONATE (ANTACID) CHEW TAB 500 MG 500 MG: 500 CHEW TAB at 23:41

## 2023-02-02 RX ADMIN — ONDANSETRON 4 MG: 2 INJECTION INTRAMUSCULAR; INTRAVENOUS at 11:28

## 2023-02-02 RX ADMIN — ONDANSETRON 4 MG: 2 INJECTION INTRAMUSCULAR; INTRAVENOUS at 09:23

## 2023-02-02 RX ADMIN — HYDROMORPHONE HYDROCHLORIDE 0.2 MG: 1 INJECTION, SOLUTION INTRAMUSCULAR; INTRAVENOUS; SUBCUTANEOUS at 18:53

## 2023-02-02 RX ADMIN — ACETAMINOPHEN 975 MG: 325 TABLET, FILM COATED ORAL at 19:57

## 2023-02-02 ASSESSMENT — ACTIVITIES OF DAILY LIVING (ADL)
ADLS_ACUITY_SCORE: 18

## 2023-02-02 ASSESSMENT — ENCOUNTER SYMPTOMS
ORTHOPNEA: 0
SEIZURES: 0

## 2023-02-02 NOTE — OR NURSING
Spoke with Dr Umana about pt's continued rating of back pain 9/10 and amount of pain meds given already by PACU nursing.  Pt able to move in bed slowly and able to stand at the bedside in an attempt to use urinal. Pt seemingly otherwise comfortable.  Sleeps between cares and VSS on RA.  No c/o numbness/tingling. Asked to give pt his next dose of Tylenol and give another dose of Dilaudid. Dr Umana will come to bedside to assess.

## 2023-02-02 NOTE — OR NURSING
Pt continues to rate pain between 7-9 in back.  Generalized soreness.  Surgery paged regarding pain level and they declined to do a ketamine drip stating it was too much pain medication based on the surgery the pt had done.  Surgery came to bedside to assess pt.  All neuros are intact.  VSS on room air.  2.5 mg valium ordered to help ease muscle pain for pt.  Given as ordered.  Pt is resting comfortably now and sleeping after administration.

## 2023-02-02 NOTE — ANESTHESIA POSTPROCEDURE EVALUATION
Patient: Yobani Baldwin    Procedure: Procedure(s):  Right Far Lateral lumbar 4-5 discetomy and midline Lumbar 5 to sacral 1 decompression       Anesthesia Type:  General    Note:  Disposition: Admission   Postop Pain Control: Challenging            Challenges/Interventions: Acute Pain; Multimodal therapy            Sign Out: Well controlled pain   PONV: No   Neuro/Psych: Uneventful            Sign Out: Acceptable/Baseline neuro status   Airway/Respiratory: Uneventful            Sign Out: Acceptable/Baseline resp. status   CV/Hemodynamics: Uneventful            Sign Out: Acceptable CV status; No obvious hypovolemia; No obvious fluid overload   Other NRE:    DID A NON-ROUTINE EVENT OCCUR?     Event details/Postop Comments:  Doing well. Alert, oriented. Mild sore throat. No nausea. Multimodal analgesics for pain control.  Patient denies any concerns.                Last vitals:  Vitals Value Taken Time   /83 02/02/23 1315   Temp 36.6  C (97.9  F) 02/02/23 1300   Pulse 63 02/02/23 1316   Resp 12 02/02/23 1316   SpO2 100 % 02/02/23 1317   Vitals shown include unvalidated device data.    Electronically Signed By: Naty Umana MD  February 2, 2023  2:13 PM

## 2023-02-02 NOTE — OR NURSING
"Pt attempted x 2 to stand at bedside and void into urinal. Pt unable to void and states there is \"pressure\" down there. Pt initially resistent to being straight cath'd but explained reasoning to pt. Pt straight cath'd for 550 mls at 1240.  States relief afterwards.  "

## 2023-02-02 NOTE — ANESTHESIA PROCEDURE NOTES
Airway       Patient location during procedure: OR       Procedure Start/Stop Times: 2/2/2023 7:40 AM  Staff -        Anesthesiologist:  Naty Umana MD       CRNA: Sea Ordoñez APRN CRNA       Performed By: CRNA  Consent for Airway        Urgency: elective  Indications and Patient Condition       Indications for airway management: paulina-procedural       Induction type:intravenous       Mask difficulty assessment: 1 - vent by mask    Final Airway Details       Final airway type: endotracheal airway       Successful airway: ETT - single  Endotracheal Airway Details        ETT size (mm): 8.0       Cuffed: yes       Inital cuff pressure (cm H2O): 25       Successful intubation technique: video laryngoscopy       VL Blade Size: MAC 4       Grade View of Cords: 1       Adjucts: stylet       Position: Right       Measured from: gums/teeth       Secured at (cm): 24       Bite block used: None    Post intubation assessment        Placement verified by: capnometry, equal breath sounds and chest rise        Number of attempts at approach: 1       Number of other approaches attempted: 0       Secured with: pink tape       Ease of procedure: easy       Dentition: Intact and Unchanged    Medication(s) Administered   Medication Administration Time: 2/2/2023 7:40 AM

## 2023-02-02 NOTE — OR NURSING
Spoke with surgery again as well as Dr Umana regarding pt pain level.  Pt with expected amount of pain per surgery.  All neuros intact.  VSS.  Sleepy between cares/movement. PACU has given full amount of narcotics and benzodiazepines possible to make pt as comfortable as possible.  Pt rating pain at 4 while at rest.  Ok to send to floor per Dr Umana.  Full report given to [facility] 5th floor.  Pt also sent with his discharge medications to floor that were filled by pharmacy.

## 2023-02-02 NOTE — PHARMACY-ADMISSION MEDICATION HISTORY
Admission Medication History Completed by Pharmacy    See [facility] Admission Navigator for allergy information, preferred outpatient pharmacy, prior to admission medications and immunization status.     Medication History Sources:     Patient     Surescripts Dispense History    Changes made to PTA medication list (reason):    Added: melatonin, acetaminophen (per patient)    Deleted: None    Changed:   o Added directions to ibuprofen and methocarbamol  o Changed methocarbamol 750 PRN to 500-1000 mg QID PRN (per Surescripts, last dispensed 11/1/22)    Additional Information:    Patient reports that he still has both cyclobenzaprine and methocarbamol 750 mg and 500 mg tablets that he uses as needed. States he used cyclobenzaprine at night and methocarbamol during the day.    There were no fills in past year for cyclobenzaprine or 750 mg methocarbamol tablets but may have some from previous prescription.     Prior to Admission medications    Medication Sig Last Dose Taking? Auth Provider Long Term End Date   acetaminophen (TYLENOL) 325 MG tablet Take 2 tablets (650 mg) by mouth every 4 hours as needed for mild pain  Yes Bishop BA Peralta, CHIQUIS     ibuprofen (ADVIL/MOTRIN) 800 MG tablet Take 800 mg by mouth every 8 hours as needed Past Month Yes Reported, Patient     melatonin 5 MG tablet Take 5 mg by mouth nightly as needed for sleep Past Month Yes Unknown, Entered By History     methocarbamol (ROBAXIN) 500 MG tablet Take 500-1,000 mg by mouth 4 times daily as needed for muscle spasms  Yes Unknown, Entered By History     oxyCODONE (ROXICODONE) 5 MG tablet Take 1 tablet (5 mg) by mouth every 4 hours as needed for moderate to severe pain  Yes Florin Verde MD  2/5/23       Date completed: 02/02/23    Medication history completed by: Talia Hui [facility]

## 2023-02-02 NOTE — PROGRESS NOTES
PACU to Inpatient Nursing Handoff    Patient Yobani Baldwin is a 42 year old male who speaks English.   Procedure Procedure(s):  Right Far Lateral lumbar 4-5 discetomy and midline Lumbar 5 to sacral 1 decompression   Surgeon(s) Primary: Florin Verde MD  Assisting: Bishop BA Peralta PA-C  Resident - Assisting: Clifford Pereira MD     No Known Allergies    Isolation  No active isolations     Past Medical History   has a past medical history of Back pain and Lumbar radiculopathy.    Anesthesia General   Dermatome Level     Preop Meds acetaminophen (Tylenol) - time given: 0707   Nerve block Not applicable   Intraop Meds dexamethasone (Decadron)  dexmedetomidine (Precedex): 20 mcg total  fentanyl (Sublimaze): 150 mcg total  ketamine (Ketalar): 50 mg given  ondansetron (Zofran): last given at 0830  versed 2mg, MGSO4 2g, lidocaine drip   Local Meds No   Antibiotics cefazolin (Ancef) - last given at 0723     Pain Patient Currently in Pain: other (see comments) (patient sleeping)   PACU meds  fentanyl (Sublimaze): 100 mcg (total dose) last given at 1023   hydromorphone (Dilaudid): 1.2 mg (total dose) last given at 1119   oxycodone (Roxicodone): 10 mg (total dose) last given at 1120   Robaxin 750 mg at 1120, Zofran given at 1128   PCA / epidural No   Capnography     Telemetry ECG Rhythm: Normal sinus rhythm   Inpatient Telemetry Monitor Ordered? No        Labs Glucose Lab Results   Component Value Date    GLC 82 02/02/2023    GLC 96 10/20/2020       Hgb Lab Results   Component Value Date    HGB 15.5 02/02/2023    HGB 15.9 03/17/2015       INR No results found for: INR   PACU Imaging Not applicable     Wound/Incision Incision/Surgical Site 02/02/23 Right;Lower Back (Active)   Incision Assessment UTV 02/02/23 1100   Gita-Incision Assessment UTV 02/02/23 0954   Closure Liquid bandage 02/02/23 0930   Incision Drainage Amount None 02/02/23 1100   Dressing Intervention Clean, dry, intact 02/02/23 1100    Number of days: 0       Incision/Surgical Site 02/02/23 Midline;Lower Back (Active)   Incision Assessment UTV 02/02/23 1100   Gita-Incision Assessment UTV 02/02/23 0954   Closure Liquid bandage 02/02/23 0931   Incision Drainage Amount None 02/02/23 1100   Dressing Intervention Clean, dry, intact 02/02/23 1100   Number of days: 0      CMS        Equipment ice pack   Other LDA       IV Access Peripheral IV 02/02/23 Right Hand (Active)   Site Assessment L 02/02/23 1100   Line Status Infusing 02/02/23 1100   Dressing Intervention New dressing  02/02/23 0715   Number of days: 0       Peripheral IV 02/02/23 Left Lower forearm (Active)   Site Assessment [facility] 02/02/23 1100   Line Status Saline locked 02/02/23 1100   Number of days: 0      Blood Products Not applicable EBL 10 mL   Intake/Output Date 02/02/23 0700 - 02/03/23 0659   Shift 1769-3577 7101-6871 7243-4759 24 Hour Total   INTAKE   I.V. 1300   1300   Shift Total(mL/kg) 1300(14.76)   1300(14.76)   OUTPUT   Blood 10   10   Shift Total(mL/kg) 10(0.11)   10(0.11)   Weight (kg) 88.1 88.1 88.1 88.1      Drains / Lilly Closed/Suction Drain 1 Left;Midline Back Accordion 10 Syriac hemovac placed to the upper left of midline incision (Active)   Site Description [facility] 02/02/23 1100   Drainage Appearance Bloody/Bright Red 02/02/23 1100   Status Other (Comment) 02/02/23 1100   Number of days: 0      Time of void PreOp Void Prior to Procedure: 0515 (02/02/23 0710)    PostOp      Diapered? No   Bladder Scan     PO    tolerating sips     Vitals    B/P: 133/81  T: 97.5  F (36.4  C)    Temp src: Axillary  P:  Pulse: 62 (02/02/23 1100)          R: (!) 7  O2:  SpO2: 98 %    O2 Device: None (Room air) (02/02/23 1100)    Oxygen Delivery: 8 LPM (02/02/23 0954)         Family/support present significant other updated   Patient belongings  2 bags returned to patient   Patient transported on cart and air mat   DC meds/scripts (obs/outpt) Yes, meds    Inpatient Pain Meds Released? Yes        Special needs/considerations None   Tasks needing completion None       Astrid Rico, RN  ASCOM ***

## 2023-02-02 NOTE — OP NOTE
DATE OF SURGERY: 2/2/2023    PREOPERATIVE DIAGNOSIS:  Lumbar radiculopathy  Lumbar stenosis with claudication              POSTOPERATIVE DIAGNOSIS: Same    PROCEDURES:  1. L5 and S1 laminectomy and partial medial facetectomies and foraminotomies for decompression of the L5 and S1 nerve roots.  2. Far Lateral Right L4-5 discectomy for decompression of the exiting L4 root, which was performed through a separate skin and fascial incision.    PRIMARY SURGEON: Florin Verde MD    FIRST ASSISTANT: Marck Pereira, PGY4    ANESTHESIA: General Endotracheal    COMPLICATIONS:  None.    SPECIMENS: None.    ESTIMATED BLOOD LOSS: 10 mL    INDICATIONS:                          Yobani Baldwin is a 42 year old male who elected surgical treatment, and understood the indications for this surgery, as well as its risks, benefits, and alternatives as documented in the pre-operative H&P.  Specifically, we reviewed the risks and benefits of the surgery in detail. The risks include, but are not limited to, the general risks associated with anesthesia, including death, pulmonary embolism, DVT, stroke, myocardial infarction, pneumonia, and urinary tract infection. Additional risks specific to the surgery include the risk of infection, dural tear with resultant CSF leak which might necessitate placement of a drain or revision surgery or could result in headaches, nerve injury resulting in weakness or paralysis, risk of adjacent segment disease, the risks of vascular injury, need for revision surgery in the future due to one of the above issues, or risk of incomplete symptom relief. Yobani Baldwin understands the risks of the surgery and wishes to proceed.  No Guarantees were given.       DESCRIPTION OF PROCEDURE:           Yobani Baldwin was taken to the operating room, where the Anesthesiology Service induced satisfactory general anesthesia. Ancef was given IV.  Venous thromboembolic prophylaxis was performed with  sequential devices.  The patient was placed prone on an open OSI frame with the abdomen hanging free and all bony prominences well padded.  The low back was then prepped and draped in its entirety in the usual sterile fashion.  We then held a multidisciplinary time out in which we verified the patient, procedure, antibiotics, and operative plan.  All team members were in agreement.    Digital Radiography was brought into the sterile field to obtain a true lateral view and needles were placed to thomas the intended point of incision.  We made a midline incision and a bilateral subperiosteal exposure was performed of the L5 through S1 spinous processes and medial lamina.  A needle was placed into the medial facet joint at the caudal most level and a final image was then obtained to verify our position.     The decompression was performed in the same fashion at each level sequentially including the L5 and S1 levels which resulted in the decompression of the L5 and S1 nerve roots.      For each level, the spinous process was removed with a rongeur. The dorsal cortex of the lamina was then thinned with the rongeur.  We palpated the lateral border of the pars to verify the planned width of the decompression.  I used a cautery to thomas out the planned limits of the resection to provide a visual reference.  A 4mm round bur was then used to remove the remaining dorsal cortical and cancellous layers.  Eventually, the ligamentum flavum was uncovered by the bur in the midline.  The proximal origin of the ligamentum flavum and epidural fat were identified. A curette was used to split and elevate the flavum in the midline, exposing the epidural fat underneath.  Kerrison punches were then used to resect the flavum down to the caudad laminar edge.  At this point the central decompression was complete, and I turned my attention to the partial medial facetectomy.  A 5mm strait osteotome was used to resect the remaining overhanging  medial facet.  Where necessary the resection was completed with a kerrison.  This was continued laterally until the medial wall of the pedicle was readily palpable.  I then completed the foraminotomies.  A pina was used to trace out the edge of the pedicle.  I then introduced a 2mm kerrison into the foramen below the pedicle, keeping the shoe of the kerrison facing the nerve root.  Using multiple bites in this way, I was able to remove the remaining facet capsule and osteophytes that were compressing the exiting nerve root.  In the same fashion, I was able to reach out into the foramen above the pedicle and remove any compression on the exiting nerve root above. This process was performed sequentially at each of the levels noted.       In performing the decompression, I found he was quite tight in the lateral recess.  After the decompression this area seemed quite free.    I then moved up to the L4-5 far lateral disc herniation.  A separate right paramedian incision was made and I dssected down to the L4 and L5 transverse processes.  A digital image was taken to evaluate the location.  I then detached the inter-transverse ligaments with a curette to expose the exiting L4 root underneath.  I identified the L4-5 disc underneath this and there was an obvious ventral herniation and bulging of the annulus.  It seemed quite scarred in this area and the nerve was purple in color.  I moblized the root and then incised into the disc with a 15 blade.  About 1cc of loose disc material was removed.  I also used an osteotomy to cut away the bulging annulus from the inferior aspect of L4 body and to make sure the root was completely free from this bulge throughout its course.      The wound was then thoroughly irrigated.  Hemostasis was achieved.  A hemovac drain was placed.  The wound was closed in layers with vicryl suture, followed by monocryl and dermabond for the skin.  A sterile dressing was applied. The patient was  turned supine, extubated, and returned to the recovery area in stable condition.      I was present and scrubbed for the critical portions of the procedure including the exposure and decompression.    Florin Verde MD

## 2023-02-02 NOTE — CONSULTS
"[facility]  Consult Note - Hospitalist Service, GOLD TEAM 19  Date of Admission:  2/2/2023  Consult Requested by: Bishop Kathryn PA-C  Reason for Consult: Co-medical management    Assessment & Plan   Yobani Baldwin is a very pleasant 42-year-old black male admitted on 2/2/2023.  Patient underwent lumbosacral spinal surgery on 2/2/2023 with Dr. Florin Verde.  Patient is understandably reporting some postoperative back discomfort.  Patient reports nausea, despite recent Zofran administration.  Per nursing staff, there is some concern for urinary retention.  Patient has already required straight catheterization.  Internal medicine hospitalist service was consulted for co-medical management.    #Status post lumbosacral spinal surgery  -- Orthopedic surgery primary service  -- As needed pain control  -- Will add pneumatic compression devices for postoperative DVT prophylaxis  -- Physical, occupational therapy evaluations    #Post-operative nausea  -- Patient previously administered Zofran  -- Will add additional order for Compazine as needed  -- Check EKG to evaluate corrected QT duration    #Urinary retention  -- Continue to monitor bladder volume  -- Will evaluate the need for straight cath on an ongoing basis     Clinically Significant Risk Factors Present on Admission                       # Overweight: Estimated body mass index is 27.09 kg/m  as calculated from the following:    Height as of this encounter: 1.803 m (5' 11\").    Weight as of this encounter: 88.1 kg (194 lb 3.6 oz).           Charles Guy DO, S  Hospitalist Service, GOLD TEAM 19  Securely message with [employer] (more info)  Text page via [place]. com[employer] cn Paging/Directory   See signed in provider for up to date coverage information  ______________________________________________________________________    Chief Complaint   Status post lumbosacral spinal surgery.    History is obtained from the patient " & electronic medical record.    History of Present Illness   Yobani Baldwin is a very pleasant 42-year-old black male admitted on 2/2/2023.  Patient underwent lumbosacral spinal surgery on 2/2/2023 with Dr. Florin Verde.  Patient is understandably reporting some postoperative back discomfort.  Patient reports nausea, despite recent Zofran administration.  Per nursing staff, there is some concern for urinary retention.  Patient has already required straight catheterization.  Internal medicine hospitalist service was consulted for co-medical management.    Past Medical History    Past Medical History:   Diagnosis Date     Back pain      Lumbar radiculopathy        Past Surgical History   Past Surgical History:   Procedure Laterality Date     COLONOSCOPY N/A 02/09/2022    Procedure: COLONOSCOPY, FLEXIBLE, WITH LESION REMOVAL USING SNARE;  Surgeon: Cory Grande MD;  Location:  GI     DENTAL SURGERY      wisdom teeth     ESOPHAGOSCOPY, GASTROSCOPY, DUODENOSCOPY (EGD), COMBINED N/A 02/09/2022    Procedure: Esophagoscopy, gastroscopy, duodenoscopy (EGD), combined;  Surgeon: Cory Grande MD;  Location:  GI       Medications   Medications Prior to Admission   Medication Sig Dispense Refill Last Dose     ibuprofen (ADVIL/MOTRIN) 800 MG tablet Take 800 mg by mouth as needed   Past Month     methocarbamol (ROBAXIN) 750 MG tablet Take 750 mg by mouth as needed   Past Month     [DISCONTINUED] cyclobenzaprine (FLEXERIL) 10 MG tablet Take 10 mg by mouth as needed   Past Month          Review of Systems    The 5 point Review of Systems is negative other than noted in the HPI.    Social History   I have reviewed this patient's social history and updated it with pertinent information if needed.  Social History     Tobacco Use     Smoking status: Some Days     Types: Cigars     Smokeless tobacco: Never     Tobacco comments:     sociallly   Vaping Use     Vaping Use: Never used   Substance  Use Topics     Alcohol use: Yes     Alcohol/week: 0.0 standard drinks     Comment: socially     Drug use: No       Family History   I have reviewed this patient's family history and updated it with pertinent information if needed.  Family History   Problem Relation Age of Onset     Mental Illness Mother      Family History Negative Father      Diabetes No family hx of      Coronary Artery Disease No family hx of      Hypertension No family hx of      Hyperlipidemia No family hx of      Cerebrovascular Disease No family hx of      Breast Cancer No family hx of      Colon Cancer No family hx of      Prostate Cancer No family hx of      Other Cancer No family hx of      Depression No family hx of      Anxiety Disorder No family hx of      Substance Abuse No family hx of      Anesthesia Reaction No family hx of      Asthma No family hx of      Osteoporosis No family hx of      Genetic Disorder No family hx of      Thyroid Disease No family hx of      Obesity No family hx of      Unknown/Adopted No family hx of      Deep Vein Thrombosis (DVT) No family hx of        Allergies   No Known Allergies     Physical Exam   Vital Signs: Temp: 97.9  F (36.6  C) Temp src: Axillary BP: 130/83 Pulse: 60   Resp: 16 SpO2: 100 % O2 Device: None (Room air) Oxygen Delivery: 8 LPM  Weight: 194 lbs 3.6 oz    GENERAL: Alert and oriented x 3; some difficulty transferring from sitting to laying flat.  HEENT: Normocephalic; atraumatic; PERRLA; MMM.  CV: RRR; normal S1, S2; no rubs, murmurs, or gallops.  RESP: Lung fields clear to aucultation B/L; no wheezing or crepitations.  GI: Abdomen is soft, nontender, nondistended; no organomegaly; normal bowel sounds.  : Deferred genital examination.   MSK: No clubbing, cyanosis, or edema.  DERM: Skin is intact; no rash, lesions, or skin breakdown.  NEURO: No focal deficits appreciated; strength & sensorium are grossly intact.  PSYCH: No active hallucinations; affect, insight appear within normal  limits.    Medical Decision Making       45 MINUTES SPENT BY ME on the date of service doing chart review, history, exam, documentation & further activities per the note.      Data     I have personally reviewed the following data over the past 24 hrs:    16.5 (H)  \   15.5   / 265     138 111 (H) 13 /  82   3.8 22 0.94 \       Imaging results reviewed over the past 24 hrs:   Recent Results (from the past 24 hour(s))   XR Lumbar Spine Port 1 View    Narrative    EXAMINATION:  XR CROSSTABLE LATERAL LUMBAR SPINE PORTABLE 2/2/2023  8:44 AM.    COMPARISON: 3/30/2020.    HISTORY:  right far lateral L4-L5 discectomy & midline L5-S1  decompression    FINDINGS: Lateral intraoperative view of the lumbar spine. 5 lumbar  type vertebral bodies are assumed.    Radiopaque probes at the level of the inferior endplate of L5 and at  the S2 vertebral body. No acute osseous abnormalities.      Impression    IMPRESSION: Radiopaque probes at the level of the inferior endplate of  L5 and at the S2 vertebral body segment. No acute osseous  abnormalities.    OLIVER FU DO         SYSTEM ID:  [id number]   XR Lumbar Spine Port 1 View    Narrative    EXAMINATION:  XR CROSSTABLE LATERAL LUMBAR SPINE PORTABLE 2/2/2023  8:45 AM.    COMPARISON: Same-day radiograph.    HISTORY:  right far lateral L4-L5 discectomy & midline L5-S1  decompression    FINDINGS: Lateral intraoperative views of the lumbar spine. 5 lumbar  type vertebral bodies are seen.    Radiopaque probes at the level of the superior plate of L5 and at the  superior endplate of the S1 vertebral body segment. No acute osseous  abnormalities.      Impression    IMPRESSION: Radiopaque probes at the level of the superior plate of L5  and at the superior endplate of the S1 vertebral body segment. No  acute osseous abnormalities.    OLIVER FU DO         SYSTEM ID:  [id number]   XR Lumbar Spine Port 1 View    Narrative    EXAMINATION:  XR CROSSTABLE LATERAL LUMBAR SPINE PORTABLE  2/2/2023  8:46 AM.    COMPARISON: Same-day radiograph.    HISTORY:  right far lateral L4-L5 discectomy & midline L5-S1  decompression    FINDINGS: Lateral intraoperative view of the lumbar spine. 5 lumbar  type vertebral bodies are seen.    Radiopaque intraoperative probe has been advanced and is located near  the superior endplate of L5. Probe at the level of the S1 superior  endplate has been removed. No acute osseous abnormalities.      Impression    IMPRESSION: Radiopaque intraoperative probe has been advanced and is  located near the superior endplate of L5.     OLIVER FU DO         SYSTEM ID:  [id number]   XR Lumbar Spine Port 1 View    Narrative    EXAMINATION:  XR CROSSTABLE LATERAL LUMBAR SPINE PORTABLE 2/2/2023  8:47 AM.    COMPARISON: Same-day radiographs.    HISTORY:  right far lateral L4-L5 discectomy & midline L5-S1  decompression    FINDINGS: Lateral intraoperative view of the lumbar spine. Radiopaque  probe at the level of the superior endplate of the L5 vertebral body.  Soft tissue retractors in place. No acute osseous abnormalities. Soft  tissue air. Additional metallic foreign body projects over the gluteal  soft tissues.      Impression    IMPRESSION: Radiopaque probe at the superior endplate of L5. Soft  tissue retractors in place.    OLIVER FU DO         SYSTEM ID:  [id number]

## 2023-02-02 NOTE — ANESTHESIA CARE TRANSFER NOTE
Patient: Yobani Baldwin    Procedure: Procedure(s):  Right Far Lateral lumbar 4-5 discetomy and midline Lumbar 5 to sacral 1 decompression       Diagnosis: Lumbar radiculopathy [M54.16]  Diagnosis Additional Information: No value filed.    Anesthesia Type:   General     Note:    Oropharynx: oropharynx clear of all foreign objects and spontaneously breathing  Level of Consciousness: awake and drowsy  Oxygen Supplementation: face mask  Level of Supplemental Oxygen (L/min / FiO2): 8  Independent Airway: airway patency satisfactory and stable  Dentition: dentition unchanged  Vital Signs Stable: post-procedure vital signs reviewed and stable  Report to RN Given: handoff report given  Patient transferred to: PACU    Handoff Report: Identifed the Patient, Identified the Reponsible Provider, Reviewed the pertinent medical history, Discussed the surgical course, Reviewed Intra-OP anesthesia mangement and issues during anesthesia, Set expectations for post-procedure period and Allowed opportunity for questions and acknowledgement of understanding      Vitals:  Vitals Value Taken Time   /69 02/02/23 0955   Temp     Pulse 70 02/02/23 0959   Resp 15 02/02/23 0959   SpO2 100 % 02/02/23 0959   Vitals shown include unvalidated device data.    Electronically Signed By: SUKHWINDER Tapia CRNA  February 2, 2023  10:00 AM

## 2023-02-02 NOTE — ANESTHESIA PREPROCEDURE EVALUATION
Anesthesia Pre-Procedure Evaluation    Patient: Yobani Baldwin   MRN: [id number] : 1980        Procedure : Procedure(s):  Right Far Lateral lumbar 4-5 discetomy and midline Lumbar 5 to sacral 1 decompression          Past Medical History:   Diagnosis Date     Back pain      Lumbar radiculopathy       Past Surgical History:   Procedure Laterality Date     COLONOSCOPY N/A 2022    Procedure: COLONOSCOPY, FLEXIBLE, WITH LESION REMOVAL USING SNARE;  Surgeon: Cory Grande MD;  Location:  GI     DENTAL SURGERY      wisdom teeth     ESOPHAGOSCOPY, GASTROSCOPY, DUODENOSCOPY (EGD), COMBINED N/A 2022    Procedure: Esophagoscopy, gastroscopy, duodenoscopy (EGD), combined;  Surgeon: Cory Grande MD;  Location:  GI      No Known Allergies   Social History     Tobacco Use     Smoking status: Some Days     Types: Cigars     Smokeless tobacco: Never     Tobacco comments:     sociallly   Substance Use Topics     Alcohol use: Yes     Alcohol/week: 0.0 standard drinks     Comment: socially      Wt Readings from Last 1 Encounters:   23 88.1 kg (194 lb 3.6 oz)        Anesthesia Evaluation   Pt has not had prior anesthetic Type: MAC.    No history of anesthetic complications       ROS/MED HX  ENT/Pulmonary: Comment: Occasional cigar. No history of cigarette smoking.    (+) allergic rhinitis,  (-) asthma and sleep apnea   Neurologic:  - neg neurologic ROS  (-) no seizures and no CVA   Cardiovascular:    (-) TELLES, orthopnea/PND, syncope and wheezes   METS/Exercise Tolerance: >4 METS Comment: Activity limited due to lumbar radiculopathy     Hematologic:  - neg hematologic  ROS  (-) history of blood clots and history of blood transfusion   Musculoskeletal: Comment: Lumbar radiculopathy    Shoulder pain        GI/Hepatic:  - neg GI/hepatic ROS  (-) GERD and liver disease   Renal/Genitourinary:  - neg Renal ROS  (-) renal disease   Endo:  - neg endo ROS  (-) Type II DM    Psychiatric/Substance Use:     (+) psychiatric history depression     Infectious Disease:  - neg infectious disease ROS     Malignancy:  - neg malignancy ROS     Other:  - neg other ROS          Physical Exam    Airway        Mallampati: I   TM distance: > 3 FB   Neck ROM: full   Mouth opening: > 3 cm    Respiratory Devices and Support         Dental     Comment: Broken left upper molar      B=Bridge, C=Chipped, L=Loose, M=Missing    Cardiovascular          Rhythm and rate: regular and normal     Pulmonary           breath sounds clear to auscultation   (-) no wheezes        OUTSIDE LABS:  CBC:   Lab Results   Component Value Date    WBC 9.4 01/25/2022    WBC 8.8 03/17/2015    HGB 14.7 01/25/2022    HGB 15.9 03/17/2015    HCT 44.7 01/25/2022    HCT 48.8 03/17/2015     01/25/2022     03/17/2015     BMP:   Lab Results   Component Value Date     01/25/2022     03/17/2015    POTASSIUM 4.5 01/25/2022    POTASSIUM 4.4 03/17/2015    CHLORIDE 114 (H) 01/25/2022    CHLORIDE 107 03/17/2015    CO2 26 01/25/2022    CO2 28 03/17/2015    BUN 12 01/25/2022    BUN 13 03/17/2015    CR 0.93 01/25/2022    CR 0.94 03/17/2015    GLC 80 01/25/2022    GLC 96 10/20/2020     COAGS: No results found for: PTT, INR, FIBR  POC: No results found for: BGM, HCG, HCGS  HEPATIC:   Lab Results   Component Value Date    ALBUMIN 4.0 01/25/2022    PROTTOTAL 7.3 01/25/2022    ALT 50 01/25/2022    AST 28 01/25/2022    ALKPHOS 78 01/25/2022    BILITOTAL 0.3 01/25/2022     OTHER:   Lab Results   Component Value Date    JOSE 9.2 01/25/2022    TSH 1.39 12/10/2015       Anesthesia Plan    ASA Status:  2   NPO Status:  NPO Appropriate    Anesthesia Type: General.     - Airway: ETT   Induction: Intravenous.   Maintenance: Balanced.   Techniques and Equipment:     - Lines/Monitors: 2nd IV     Consents    Anesthesia Plan(s) and associated risks, benefits, and realistic alternatives discussed. Questions answered and  patient/representative(s) expressed understanding.    - Discussed:     - Discussed with:  Patient         Postoperative Care    Pain management: IV analgesics, Oral pain medications.   PONV prophylaxis: Ondansetron (or other 5HT-3), Dexamethasone or Solumedrol     Comments:    Other Comments: Discussed risks of general anesthesia, including aspiration pneumonia, sore throat/hoarse voice, abrasions/damage to lips/tongue/teeth, nausea, rare complications (including medication reactions, cardiac, pulmonary, hypoxia/low oxygen, recall). Ensured understanding, invited questions and all questions were answered. Patient wishes to proceed.    Discussed prone positioning and associated risks of pressure injury, vision loss, airway edema requiring postop intubation.        H&P reviewed: Unable to attach H&P to encounter due to EHR limitations. H&P Update: appropriate H&P reviewed, patient examined. No interval changes since H&P (within 30 days).         Naty Umana MD

## 2023-02-02 NOTE — BRIEF OP NOTE
Brief Operative Note    Preop Dx:   Lumbar radiculopathy [M54.16]  Post op Dx:   Same  Procedure:    Procedure(s):  Right Far Lateral lumbar 4-5 discetomy and midline Lumbar 5 to sacral 1 decompression  Surgeon:     Amomn   Assistants:    Bishop Kathryn PA-C  Anesthesia:   General  EBL:    25mL   Total IV Fluids:  See Anesthesia Record  Specimens:   None  Findings:   See Operative Dictation  Complications:  None.    Assessment and Plan: Yobani Baldwin is a 42 year old male now s/p above procedure on 2/2/2023 with Dr. Verde.     Ammon Primary  Activity:   - Up with assist until independent. No excessive bending or twisting. No lifting >10 lbs x 6 weeks.   Weight bearing status: WBAT.  Pain management: PO narcotics as tolerated.   Antibiotics: Ancef until drains removed  Diet: Begin with clear fluids and progress diet as tolerated.   DVT prophylaxis: SCDs only. No chemical DVT ppx needed.  Imaging: none  Labs: none  Bracing/Splinting: None.  Dressings: Keep dressing c/d/i x 2 days.  Drains: none  Lilly catheter: not used.  Physical Therapy/Occupational Therapy: Eval and treat.  Cultures: none.    Follow-up: Clinic with Dr. Verde in 6 weeks with repeat x-rays.   Disposition: Pending progress with therapies, pain control on orals, and medical stability, anticipate discharge to home on POD #1-2.    Patient may discharge tomorrow with stable drain output, pain control, and no troubles ambulating with therapies.    The procedure was medically necessary for an assistant. My assistance was necessary for patient positioning, prepping and draping, soft tissue retraction, and closure. The assistance that I provided reduced operative time which meant less general anesthetic for the patient.    Bishop Kathryn PA-C  Orthopedic Spine Surgery    Thank you for allowing me to participate in this patient's care. Please page me directly any questions/concerns.   Securely message with the Vocera Web Console (learn more  here)  Text page via [facility] Paging/Directory    If there is no response, if it is a weekend, or if it is during evening hours, please page the orthopaedic surgery resident on call via [facility] Paging/Directory

## 2023-02-03 ENCOUNTER — APPOINTMENT (OUTPATIENT)
Dept: PHYSICAL THERAPY | Facility: CLINIC | Age: 43
End: 2023-02-03
Attending: ORTHOPAEDIC SURGERY
Payer: COMMERCIAL

## 2023-02-03 LAB
ALBUMIN SERPL-MCNC: 3.3 G/DL (ref 3.4–5)
ALP SERPL-CCNC: 67 U/L (ref 40–150)
ALT SERPL W P-5'-P-CCNC: 26 U/L (ref 0–70)
ANION GAP SERPL CALCULATED.3IONS-SCNC: 4 MMOL/L (ref 3–14)
AST SERPL W P-5'-P-CCNC: 40 U/L (ref 0–45)
ATRIAL RATE - MUSE: 67 BPM
BASOPHILS # BLD AUTO: 0 10E3/UL (ref 0–0.2)
BASOPHILS NFR BLD AUTO: 0 %
BILIRUB SERPL-MCNC: 0.7 MG/DL (ref 0.2–1.3)
BUN SERPL-MCNC: 9 MG/DL (ref 7–30)
CALCIUM SERPL-MCNC: 8.5 MG/DL (ref 8.5–10.1)
CHLORIDE BLD-SCNC: 107 MMOL/L (ref 94–109)
CO2 SERPL-SCNC: 27 MMOL/L (ref 20–32)
CREAT SERPL-MCNC: 1.03 MG/DL (ref 0.66–1.25)
DIASTOLIC BLOOD PRESSURE - MUSE: NORMAL MMHG
EOSINOPHIL # BLD AUTO: 0 10E3/UL (ref 0–0.7)
EOSINOPHIL NFR BLD AUTO: 0 %
ERYTHROCYTE [DISTWIDTH] IN BLOOD BY AUTOMATED COUNT: 13.2 % (ref 10–15)
GFR SERPL CREATININE-BSD FRML MDRD: >90 ML/MIN/1.73M2
GLUCOSE BLD-MCNC: 108 MG/DL (ref 70–99)
HCT VFR BLD AUTO: 44.1 % (ref 40–53)
HGB BLD-MCNC: 14.2 G/DL (ref 13.3–17.7)
IMM GRANULOCYTES # BLD: 0.1 10E3/UL
IMM GRANULOCYTES NFR BLD: 1 %
INTERPRETATION ECG - MUSE: NORMAL
LYMPHOCYTES # BLD AUTO: 1.7 10E3/UL (ref 0.8–5.3)
LYMPHOCYTES NFR BLD AUTO: 9 %
MCH RBC QN AUTO: 28.8 PG (ref 26.5–33)
MCHC RBC AUTO-ENTMCNC: 32.2 G/DL (ref 31.5–36.5)
MCV RBC AUTO: 90 FL (ref 78–100)
MONOCYTES # BLD AUTO: 1.7 10E3/UL (ref 0–1.3)
MONOCYTES NFR BLD AUTO: 9 %
NEUTROPHILS # BLD AUTO: 16.2 10E3/UL (ref 1.6–8.3)
NEUTROPHILS NFR BLD AUTO: 81 %
NRBC # BLD AUTO: 0 10E3/UL
NRBC BLD AUTO-RTO: 0 /100
P AXIS - MUSE: 62 DEGREES
PLATELET # BLD AUTO: 227 10E3/UL (ref 150–450)
POTASSIUM BLD-SCNC: 4.2 MMOL/L (ref 3.4–5.3)
PR INTERVAL - MUSE: 136 MS
PROT SERPL-MCNC: 6.7 G/DL (ref 6.8–8.8)
QRS DURATION - MUSE: 80 MS
QT - MUSE: 410 MS
QTC - MUSE: 433 MS
R AXIS - MUSE: 85 DEGREES
RBC # BLD AUTO: 4.93 10E6/UL (ref 4.4–5.9)
SODIUM SERPL-SCNC: 138 MMOL/L (ref 133–144)
SYSTOLIC BLOOD PRESSURE - MUSE: NORMAL MMHG
T AXIS - MUSE: 65 DEGREES
VENTRICULAR RATE- MUSE: 67 BPM
WBC # BLD AUTO: 19.8 10E3/UL (ref 4–11)

## 2023-02-03 PROCEDURE — 250N000011 HC RX IP 250 OP 636: Performed by: PHYSICIAN ASSISTANT

## 2023-02-03 PROCEDURE — 97161 PT EVAL LOW COMPLEX 20 MIN: CPT | Mod: GP | Performed by: PHYSICAL THERAPIST

## 2023-02-03 PROCEDURE — 36415 COLL VENOUS BLD VENIPUNCTURE: CPT | Performed by: INTERNAL MEDICINE

## 2023-02-03 PROCEDURE — 258N000003 HC RX IP 258 OP 636

## 2023-02-03 PROCEDURE — 250N000013 HC RX MED GY IP 250 OP 250 PS 637: Performed by: PHYSICIAN ASSISTANT

## 2023-02-03 PROCEDURE — 85025 COMPLETE CBC W/AUTO DIFF WBC: CPT | Performed by: INTERNAL MEDICINE

## 2023-02-03 PROCEDURE — 99232 SBSQ HOSP IP/OBS MODERATE 35: CPT | Performed by: INTERNAL MEDICINE

## 2023-02-03 PROCEDURE — 80053 COMPREHEN METABOLIC PANEL: CPT | Performed by: INTERNAL MEDICINE

## 2023-02-03 RX ORDER — SODIUM CHLORIDE 9 MG/ML
INJECTION, SOLUTION INTRAVENOUS
Status: COMPLETED
Start: 2023-02-03 | End: 2023-02-03

## 2023-02-03 RX ADMIN — HYDROMORPHONE HYDROCHLORIDE 0.4 MG: 1 INJECTION, SOLUTION INTRAMUSCULAR; INTRAVENOUS; SUBCUTANEOUS at 13:01

## 2023-02-03 RX ADMIN — SENNOSIDES AND DOCUSATE SODIUM 1 TABLET: 50; 8.6 TABLET ORAL at 20:17

## 2023-02-03 RX ADMIN — ACETAMINOPHEN 975 MG: 325 TABLET, FILM COATED ORAL at 20:16

## 2023-02-03 RX ADMIN — CEFAZOLIN SODIUM 2 G: 2 INJECTION, SOLUTION INTRAVENOUS at 06:33

## 2023-02-03 RX ADMIN — CEFAZOLIN SODIUM 2 G: 2 INJECTION, SOLUTION INTRAVENOUS at 22:28

## 2023-02-03 RX ADMIN — OXYCODONE HYDROCHLORIDE 10 MG: 10 TABLET ORAL at 08:10

## 2023-02-03 RX ADMIN — METHOCARBAMOL 750 MG: 750 TABLET ORAL at 01:23

## 2023-02-03 RX ADMIN — HYDROXYZINE HYDROCHLORIDE 25 MG: 25 TABLET ORAL at 22:28

## 2023-02-03 RX ADMIN — ACETAMINOPHEN 975 MG: 325 TABLET, FILM COATED ORAL at 04:22

## 2023-02-03 RX ADMIN — METHOCARBAMOL 750 MG: 750 TABLET ORAL at 15:44

## 2023-02-03 RX ADMIN — OXYCODONE HYDROCHLORIDE 10 MG: 10 TABLET ORAL at 04:22

## 2023-02-03 RX ADMIN — SODIUM CHLORIDE 500 ML: 9 INJECTION, SOLUTION INTRAVENOUS at 13:00

## 2023-02-03 RX ADMIN — HYDROXYZINE HYDROCHLORIDE 25 MG: 25 TABLET ORAL at 01:23

## 2023-02-03 RX ADMIN — CEFAZOLIN SODIUM 2 G: 2 INJECTION, SOLUTION INTRAVENOUS at 15:44

## 2023-02-03 RX ADMIN — OXYCODONE HYDROCHLORIDE 10 MG: 10 TABLET ORAL at 15:43

## 2023-02-03 RX ADMIN — SENNOSIDES AND DOCUSATE SODIUM 1 TABLET: 50; 8.6 TABLET ORAL at 08:10

## 2023-02-03 RX ADMIN — METHOCARBAMOL 750 MG: 750 TABLET ORAL at 08:24

## 2023-02-03 RX ADMIN — OXYCODONE HYDROCHLORIDE 10 MG: 10 TABLET ORAL at 00:03

## 2023-02-03 RX ADMIN — ACETAMINOPHEN 975 MG: 325 TABLET, FILM COATED ORAL at 12:11

## 2023-02-03 RX ADMIN — HYDROXYZINE HYDROCHLORIDE 25 MG: 25 TABLET ORAL at 09:57

## 2023-02-03 RX ADMIN — METHOCARBAMOL 750 MG: 750 TABLET ORAL at 22:28

## 2023-02-03 RX ADMIN — OXYCODONE HYDROCHLORIDE 10 MG: 10 TABLET ORAL at 20:17

## 2023-02-03 RX ADMIN — OXYCODONE HYDROCHLORIDE 10 MG: 10 TABLET ORAL at 12:12

## 2023-02-03 ASSESSMENT — ACTIVITIES OF DAILY LIVING (ADL)
ADLS_ACUITY_SCORE: 18
ADLS_ACUITY_SCORE: 23

## 2023-02-03 NOTE — PLAN OF CARE
[facility]  OUTPATIENT PHYSICAL THERAPY EVALUATION  PLAN OF TREATMENT FOR OUTPATIENT REHABILITATION  (COMPLETE FOR INITIAL CLAIMS ONLY)  Patient's Last Name, First Name, M.I.  YOB: 1980  Yobani Baldwin                        Provider's Name  [facility] Medical Record No.  [id number]                             Onset Date:  02/02/23   Start of Care Date:  02/03/23   Type:     _X_PT   ___OT   ___SLP Medical Diagnosis:  s/p right Far Lateral lumbar 4-5 discetomy and midline Lumbar 5 to sacral 1 decompression on 2/2/23.              PT Diagnosis:  Functional mobility deficits Visits from SOC:  1     See note for plan of treatment, functional goals and certification details    I CERTIFY THE NEED FOR THESE SERVICES FURNISHED UNDER        THIS PLAN OF TREATMENT AND WHILE UNDER MY CARE     (Physician co-signature of this document indicates review and certification of the therapy plan).

## 2023-02-03 NOTE — PLAN OF CARE
Goal Outcome Evaluation:  [id number]   Vital signs and mental status at baseline. YES    - Oral intake tolerated (at least 100 mLs fluid PO, at least 25% of a snack or meal, and oral intake on at least two separate occasions). YES    - Able to ambulate at least 30 feet. NO REFUSED TO GET OUT OF BED    - Must void prior to discharge unless Lilly inserted per Neurosurgery Bladder Management Algorithm. YES  - Adequate pain control using oral analgesics. NO    - Hypercapnia, hypoventilation or hypoxia resolved for at least 2 hours without supplemental oxygen. YES    - Deficits in sensation, mobility or coordination have resolved if spinal or regional anesthesia was used. YES    [id number]    Vital signs and mental status at baseline. YES    - Oral intake tolerated (at least 100 mLs fluid PO, at least 25% of a snack or meal, and oral intake on at least two separate occasions). YES    - Able to ambulate at least 30 feet. NO. REFUSED TO GET OUT OF BED    - Must void prior to discharge unless Lilly inserted per Neurosurgery Bladder Management Algorithm. YES    - Adequate pain control using oral analgesics. NO    - Hypercapnia, hypoventilation or hypoxia resolved for at least 2 hours without supplemental oxygen. YES    - Deficits in sensation, mobility or coordination have resolved if spinal or regional anesthesia was used. YES

## 2023-02-03 NOTE — PLAN OF CARE
Goal Outcome Evaluation:      Plan of Care Reviewed With: patient    Overall Patient Progress: improvingOverall Patient Progress: improving    Outcome Evaluation: A/Ox4, emisis x1 this shift treated with saltines. Pain slightly better with prn medications and ice packs.    A/Ox4. VSS with elevated BP off and on. On Room Air. Denied SOB, CP, Cough, N/T, Dizziness, Headache. Emesis x1 this shift. Pain rated 8/10, treated with PRN Atarax, Oxy, Robaxin and ice packs. Regular diet, thin liquids, takes pills whole. Continent of B/B, LBM 2/1/23. SBA with walker, refused gait belt. Skin is intact except Sx spine incision-dressing CDI. Accordion drain- intact. R PIV SL, L PIV SL. Call light within reach, able to make needs known. Wife in the room over night. Appears to be sleeping during rounds. Continue POC with expected discharge potentially 2/3/23 pending pain management and tolerating PO.    [id number]    - Vital signs and mental status at baseline. Yes    - Oral intake tolerated (at least 100 mLs fluid PO, at least 25% of a snack or meal, and oral intake on at least two separate occasions). No- Emesis    - Able to ambulate at least 30 feet. Yes    - Must void prior to discharge unless Lilly inserted per Neurosurgery Bladder Management Algorithm. Yes    - Adequate pain control using oral analgesics. No    - Hypercapnia, hypoventilation or hypoxia resolved for at least 2 hours without supplemental oxygen. Yes    - Deficits in sensation, mobility or coordination have resolved if spinal or regional anesthesia was used. Yes    [id number]    - Vital signs and mental status at baseline. Yes    - Oral intake tolerated (at least 100 mLs fluid PO, at least 25% of a snack or meal, and oral intake on at least two separate occasions). No- Emesis    - Able to ambulate at least 30 feet. Yes    - Must void prior to discharge unless Lilly inserted per Neurosurgery Bladder Management Algorithm. Yes    - Adequate pain control using oral  analgesics. No    - Hypercapnia, hypoventilation or hypoxia resolved for at least 2 hours without supplemental oxygen. Yes    - Deficits in sensation, mobility or coordination have resolved if spinal or regional anesthesia was used. Yes

## 2023-02-03 NOTE — PROGRESS NOTES
"Orthopedic Surgery Progress Note:     Subjective:   Emesis 1x ON. Pain well-controlled aside from occasional spasms. Tolerating a clear diet. Not yet moving bowels, +flatus. No new concerns or complaints.    Objective:   BP (!) 162/82 (BP Location: Left arm, Patient Position: Left side)   Pulse 88   Temp 98.2  F (36.8  C)   Resp 19   Ht 1.803 m (5' 11\")   Wt 88.1 kg (194 lb 3.6 oz)   SpO2 99%   BMI 27.09 kg/m    I/O this shift:  In: -   Out: 60 [Drains:60]  General: NAD. Resting comfortably in bed.  Respiratory: Breathing comfortably on RA.  Drain Output: 60/15 over last 2 shifts.  Musculoskeletal: dressing c/d/i     Motor Strength Right Left   Knee flexion: S1 5/5 5/5   Knee extension: L3, L4 5/5 5/5   Ankle dosiflexion: L4, L5 5/5 5/5   EHL: L5 4/5 4/5   Ankle plantarflexion: S1 5/5 5/5     Sensation from L1-S2 is preserved.    Laboratory Data:  Lab Results   Component Value Date    WBC 16.5 (H) 02/02/2023    HGB 15.5 02/02/2023     02/02/2023       Images:  No new images were obtained.    Assessment & Plan:   Yobani Baldwin is a 42 year old male now s/p L4-S1 decompression on 2/2 with Dr. Verde.     Ortho Primary  Activity:   - Up with assist until independent. No excessive bending or twisting. No lifting >10 lbs x 6 weeks.   Weight bearing status: WBAT.  Pain management: PO narcotics as tolerated.   Antibiotics: Ancef until drains removed  Diet: Begin with clear fluids and progress diet as tolerated.   DVT prophylaxis: SCDs only. No chemical DVT ppx needed.  Imaging: none  Labs: none  Bracing/Splinting: None.  Dressings: Keep dressing c/d/i x 2 days.  Drains: none  Lilly catheter: not used.  Physical Therapy/Occupational Therapy: Eval and treat.  Cultures: none.    Follow-up: Clinic with Dr. Verde in 6 weeks with repeat x-rays.   Disposition: Pending progress with therapies, pain control on orals, and medical stability, anticipate discharge to home on POD " #1-2.  ---------------------------------------------------------------------------    [   ] Drains discontinued.  [   ] Postoperative radiographs complete.  [   ] Discharge orders complete.  [   ] Discharge summary started.    Sissy New MD  Orthopedic Surgery PGY4  [phone]

## 2023-02-03 NOTE — PLAN OF CARE
Patient is alert and oriented x 4. VSS. Neuros WNL. C/O pain at the level of 10/10 and received dilaudid and oxycodone which were effective in the pain management. Also, c/o muscle spasm and received Robaxin for the spasm. PIV line on right arm is patent, infusing without any difficulty. While PIV on left arm is flushed, patent and saline locked. Assist of 2 with turning and repositioning. LOVELY drained 60 ml. Patient refused to get out of bed due to pain. Will continue to assess.

## 2023-02-03 NOTE — PLAN OF CARE
"Patient arrived on floor ~1330    Nursing Assessment:  VT: /78 (BP Location: Left arm)   Pulse 68   Temp 97.6  F (36.4  C) (Oral)   Resp 10   Ht 1.803 m (5' 11\")   Wt 88.1 kg (194 lb 3.6 oz)   SpO2 99%   BMI 27.09 kg/m     GI/: patient had some hesitance when voiding, later voided 600 mL    Medications: patient still had PRN IV valium ordered, Provider paged. Per Benigno (Dr. Sissy New) valium order can be discontinued    Pain Management:  Pain reported in lower back    Nursing Plan:  Continue POC, pain management    Discharge Disposition:  discharge home POD 1 or 2  "

## 2023-02-03 NOTE — PLAN OF CARE
Pt c/o back pain radiating into ronak hips. Denies numbness and tingling. Pedals+. Slow in moving. Grimacing in pain with activity.Rates pain 10/10.  Medicated with oxycodone 10 mg/Robaxin/ and later atarax for anxiety. Back dressing gauze/Tegaderm CDI. Ice to back. Hemovac 5 ml. Keila Kelly NP notified. Drain discontinued.   Pt using urinal and able to sit at bedside with assist of 1. Hesitancy to start urine stream but voided without problems after.   Needing walker for assistance in standing. Repositioned often with assist of 1-2. Order for PT to see. Will address need for walker for home.   Refused to order breakfast. Denies nausea but only states he is not hungry and will try to order food later. Taking in fluids and crackers without problem. Call light within reach.    1245 Pt unable to find comfortable position. C/o back pain 9-10/10. Pain localized in back and ronak hips radiating into buttocks. Medicated with oxycodone 10 mg with little relief. Dr. Guy in to see patient. Will hold discharge for today. Medicated with Dilaudid IV 0.4 mg. Keila Kelly NP paged. No change in pain medication. Repositioned for comfort. Ice to back. Pt updated.

## 2023-02-03 NOTE — PROGRESS NOTES
"[facility]    Medicine Progress Note - Hospitalist Service, GOLD TEAM 19    Date of Admission:  2/2/2023    Assessment & Plan   Yobani Baldwin is a very pleasant 42-year-old black male admitted on 2/2/2023.  Patient underwent lumbosacral spinal surgery on 2/2/2023 with Dr. Florin Verde.  Patient is understandably reporting some postoperative back discomfort.  Patient reports nausea, despite recent Zofran administration.  Per nursing staff, there is some concern for urinary retention.  Patient has already required straight catheterization.  Internal medicine hospitalist service was consulted for co-medical management.    #Status post lumbosacral spinal surgery  - Orthopedic surgery primary service  - As needed pain control  - Will add pneumatic compression devices for postoperative DVT prophylaxis  - Physical, occupational therapy evaluations  - Pain still uncontrolled today  - Recommend deferring discharge pending better pain control    #Post-operative nausea  - Patient previously administered Zofran  - Will add additional order for Compazine as needed  - Check EKG to evaluate corrected QT duration    #Urinary retention  - Continue to monitor bladder volume  - Will evaluate the need for straight cath on an ongoing basis       Diet: Regular Diet Adult  Diet    DVT Prophylaxis: Pneumatic Compression Devices  Lilly Catheter: Not present  Lines: None     Cardiac Monitoring: None  Code Status: Full Code      Clinically Significant Risk Factors Present on Admission              # Hypoalbuminemia: Lowest albumin = 3.3 g/dL at 2/3/2023  9:52 AM, will monitor as appropriate          # Overweight: Estimated body mass index is 27.09 kg/m  as calculated from the following:    Height as of this encounter: 1.803 m (5' 11\").    Weight as of this encounter: 88.1 kg (194 lb 3.6 oz).           Disposition Plan      Expected Discharge Date: 02/03/2023,  3:00 PM    "   Discharge Comments: cleared by ortho 2/3          Charles Guy DO, MHS  Hospitalist Service, GOLD TEAM 19  [facility]  Securely message with Physitrack (more info)  Text page via [facility] Paging/Directory   See signed in provider for up to date coverage information  ______________________________________________________________________    Interval History   Per nursing staff, concern for uncontrolled postoperative pain.  Patient is visibly writhing in pain.  Patient reports getting up to pee or being woken up for labs because his pain exacerbation.  Patient likely not appropriate for discharge at this time    Physical Exam   Vital Signs: Temp: 98.8  F (37.1  C) Temp src: Oral BP: 126/69 Pulse: 74   Resp: 19 SpO2: 96 % O2 Device: None (Room air)    Weight: 194 lbs 3.6 oz    GENERAL: Alert and oriented x 3; patient writhing in pain.  HEENT: Normocephalic; atraumatic; PERRLA; MMM.  CV: RRR; normal S1, S2; no rubs, murmurs, or gallops.  RESP: Lung fields clear to aucultation B/L; no wheezing or crepitations.  GI: Abdomen is soft, nontender, nondistended; no organomegaly; normal bowel sounds.  : Deferred genital examination.   MSK: No clubbing, cyanosis, or edema.  DERM: Skin is intact; no rash, lesions, or skin breakdown.  NEURO: No focal deficits appreciated; strength & sensorium are grossly intact.  PSYCH: No active hallucinations; affect, insight appear within normal limits.    Medical Decision Making       45 MINUTES SPENT BY ME on the date of service doing chart review, history, exam, documentation & further activities per the note.      Data     I have personally reviewed the following data over the past 24 hrs:    19.8 (H)  \   14.2   / 227     138 107 9 /  108 (H)   4.2 27 1.03 \       ALT: 26 AST: 40 AP: 67 TBILI: 0.7   ALB: 3.3 (L) TOT PROTEIN: 6.7 (L) LIPASE: N/A       Imaging results reviewed over the past 24 hrs:   No results found for this or any previous visit  (from the past 24 hour(s)).

## 2023-02-04 ENCOUNTER — APPOINTMENT (OUTPATIENT)
Dept: PHYSICAL THERAPY | Facility: CLINIC | Age: 43
End: 2023-02-04
Attending: ORTHOPAEDIC SURGERY
Payer: COMMERCIAL

## 2023-02-04 VITALS
TEMPERATURE: 98.6 F | SYSTOLIC BLOOD PRESSURE: 134 MMHG | DIASTOLIC BLOOD PRESSURE: 72 MMHG | BODY MASS INDEX: 27.19 KG/M2 | HEIGHT: 71 IN | WEIGHT: 194.22 LBS | RESPIRATION RATE: 19 BRPM | HEART RATE: 64 BPM | OXYGEN SATURATION: 100 %

## 2023-02-04 LAB
ALBUMIN SERPL-MCNC: 3 G/DL (ref 3.4–5)
ALP SERPL-CCNC: 61 U/L (ref 40–150)
ALT SERPL W P-5'-P-CCNC: 20 U/L (ref 0–70)
ANION GAP SERPL CALCULATED.3IONS-SCNC: 5 MMOL/L (ref 3–14)
AST SERPL W P-5'-P-CCNC: 27 U/L (ref 0–45)
BASOPHILS # BLD AUTO: 0.1 10E3/UL (ref 0–0.2)
BASOPHILS NFR BLD AUTO: 0 %
BILIRUB SERPL-MCNC: 0.6 MG/DL (ref 0.2–1.3)
BUN SERPL-MCNC: 10 MG/DL (ref 7–30)
CALCIUM SERPL-MCNC: 8.7 MG/DL (ref 8.5–10.1)
CHLORIDE BLD-SCNC: 104 MMOL/L (ref 94–109)
CO2 SERPL-SCNC: 28 MMOL/L (ref 20–32)
CREAT SERPL-MCNC: 0.95 MG/DL (ref 0.66–1.25)
EOSINOPHIL # BLD AUTO: 0 10E3/UL (ref 0–0.7)
EOSINOPHIL NFR BLD AUTO: 0 %
ERYTHROCYTE [DISTWIDTH] IN BLOOD BY AUTOMATED COUNT: 13.3 % (ref 10–15)
GFR SERPL CREATININE-BSD FRML MDRD: >90 ML/MIN/1.73M2
GLUCOSE BLD-MCNC: 86 MG/DL (ref 70–99)
HCT VFR BLD AUTO: 43.5 % (ref 40–53)
HGB BLD-MCNC: 14 G/DL (ref 13.3–17.7)
IMM GRANULOCYTES # BLD: 0.1 10E3/UL
IMM GRANULOCYTES NFR BLD: 1 %
LYMPHOCYTES # BLD AUTO: 1.6 10E3/UL (ref 0.8–5.3)
LYMPHOCYTES NFR BLD AUTO: 8 %
MCH RBC QN AUTO: 28.7 PG (ref 26.5–33)
MCHC RBC AUTO-ENTMCNC: 32.2 G/DL (ref 31.5–36.5)
MCV RBC AUTO: 89 FL (ref 78–100)
MONOCYTES # BLD AUTO: 2 10E3/UL (ref 0–1.3)
MONOCYTES NFR BLD AUTO: 10 %
NEUTROPHILS # BLD AUTO: 15.6 10E3/UL (ref 1.6–8.3)
NEUTROPHILS NFR BLD AUTO: 81 %
NRBC # BLD AUTO: 0 10E3/UL
NRBC BLD AUTO-RTO: 0 /100
PLATELET # BLD AUTO: 211 10E3/UL (ref 150–450)
POTASSIUM BLD-SCNC: 3.6 MMOL/L (ref 3.4–5.3)
PROT SERPL-MCNC: 7 G/DL (ref 6.8–8.8)
RBC # BLD AUTO: 4.88 10E6/UL (ref 4.4–5.9)
SODIUM SERPL-SCNC: 137 MMOL/L (ref 133–144)
WBC # BLD AUTO: 19.3 10E3/UL (ref 4–11)

## 2023-02-04 PROCEDURE — 99231 SBSQ HOSP IP/OBS SF/LOW 25: CPT | Performed by: INTERNAL MEDICINE

## 2023-02-04 PROCEDURE — 85014 HEMATOCRIT: CPT | Performed by: INTERNAL MEDICINE

## 2023-02-04 PROCEDURE — 97530 THERAPEUTIC ACTIVITIES: CPT | Mod: GP

## 2023-02-04 PROCEDURE — 250N000011 HC RX IP 250 OP 636: Performed by: PHYSICIAN ASSISTANT

## 2023-02-04 PROCEDURE — 36415 COLL VENOUS BLD VENIPUNCTURE: CPT | Performed by: INTERNAL MEDICINE

## 2023-02-04 PROCEDURE — 97116 GAIT TRAINING THERAPY: CPT | Mod: GP

## 2023-02-04 PROCEDURE — 999N000111 HC STATISTIC OT IP EVAL DEFER

## 2023-02-04 PROCEDURE — 80053 COMPREHEN METABOLIC PANEL: CPT | Performed by: INTERNAL MEDICINE

## 2023-02-04 PROCEDURE — 250N000013 HC RX MED GY IP 250 OP 250 PS 637: Performed by: PHYSICIAN ASSISTANT

## 2023-02-04 RX ADMIN — METHOCARBAMOL 750 MG: 750 TABLET ORAL at 06:26

## 2023-02-04 RX ADMIN — METHOCARBAMOL 750 MG: 750 TABLET ORAL at 11:53

## 2023-02-04 RX ADMIN — SENNOSIDES AND DOCUSATE SODIUM 1 TABLET: 50; 8.6 TABLET ORAL at 08:16

## 2023-02-04 RX ADMIN — OXYCODONE HYDROCHLORIDE 10 MG: 10 TABLET ORAL at 01:03

## 2023-02-04 RX ADMIN — OXYCODONE HYDROCHLORIDE 10 MG: 10 TABLET ORAL at 11:53

## 2023-02-04 RX ADMIN — ACETAMINOPHEN 975 MG: 325 TABLET, FILM COATED ORAL at 11:52

## 2023-02-04 RX ADMIN — OXYCODONE HYDROCHLORIDE 10 MG: 10 TABLET ORAL at 04:49

## 2023-02-04 RX ADMIN — CEFAZOLIN SODIUM 2 G: 2 INJECTION, SOLUTION INTRAVENOUS at 06:26

## 2023-02-04 RX ADMIN — OXYCODONE HYDROCHLORIDE 10 MG: 10 TABLET ORAL at 08:16

## 2023-02-04 RX ADMIN — ACETAMINOPHEN 975 MG: 325 TABLET, FILM COATED ORAL at 04:49

## 2023-02-04 ASSESSMENT — ACTIVITIES OF DAILY LIVING (ADL)
ADLS_ACUITY_SCORE: 23

## 2023-02-04 NOTE — PLAN OF CARE
Pt appears more relaxed this Am. Rates pain 7/10. Incision CDI. Denies numbness and tingling.Radial and pedal pulses 2+. Medicated with oxycodone. Pt able to sit in chair for breakfast. Transfers with assist of 1. Gait slow.Pt later received robaxin/tylenol for back pain. Rating 7/10.   Tolerating sips of liquids and light foods without nausea. Pt sleeping between nursing checks.   Pt states not passing flatus. Last BM before surgery. Pt taking stool softeners/miralax. Offered Doculax suppository but patient declined.  PT to see patient this afternoon. Plan to discharge home if PT clears.

## 2023-02-04 NOTE — PLAN OF CARE
Discharge instructions and home medications reviewed with patient and wife. Filled medications given to patient with instructions. Incisional care discussed with patient and wife. Both state they understands instructions and have no further questions.Pt discharged home with all belongings including 4x4's /tape./walker. Pt discharged via w/c accompanied by  and wife.

## 2023-02-04 NOTE — PLAN OF CARE
Physical Therapy Discharge Summary    Reason for therapy discharge:    All goals and outcomes met, no further needs identified.    Progress towards therapy goal(s). See goals on Care Plan in [facility] electronic health record for goal details.  Goals met    Therapy recommendation(s):    Continued therapy is recommended.  Rationale/Recommendations:  Recommend eventual OP PT (not required immediately) for return to PLOF and return to work as patient works as a [employer] and has an active job.

## 2023-02-04 NOTE — PLAN OF CARE
"BP (!) 137/91 (BP Location: Left arm)   Pulse 84   Temp 98.8  F (37.1  C) (Oral)   Resp 18   Ht 1.803 m (5' 11\")   Wt 88.1 kg (194 lb 3.6 oz)   SpO2 97%   BMI 27.09 kg/m      A&O x 3 , Denies sob, chest pain. N/v, palpitations   SPO2 97 on RA. Guaze and Tegaderm on spinal incision site, CDI  Pt up and ambulating to the BR with A x 1 with walker and gait belt, slow gait and grimacing with pain    Reporting 7/10 lower back pain, managed by oxycodone and robaxin  Pt c/o urine hesitancy post surgery, states that since being straight cathed before, that it has been difficult to start stream and is currently having to turn on water faucet. On call ortho paged and notified  NS infusing at 100 ml/hr. Reports good appetite. Previous nausea resolved    "

## 2023-02-04 NOTE — PROGRESS NOTES
"Orthopedic Surgery Progress Note Spine: 02/04/2023    Subjective:   No acute events overnight. Pain control improving. Tolerating a diet. Voiding spontaneously. Not yet moving bowels. Up to chair and bathroom. No new concerns or complaints.    Objective:   BP (!) 137/91 (BP Location: Left arm)   Pulse 84   Temp 98.8  F (37.1  C) (Oral)   Resp 18   Ht 1.803 m (5' 11\")   Wt 88.1 kg (194 lb 3.6 oz)   SpO2 97%   BMI 27.09 kg/m    No intake/output data recorded.  General: NAD. Resting comfortably in bed.  Respiratory: Breathing comfortably on RA.  Drain Output: No drain.  Musculoskeletal: Dressing C/D/I.    Motor Strength Right Left   Knee flexion: S1 5/5 5/5   Knee extension: L3, L4 5/5 5/5   Ankle dosiflexion: L4, L5 5/5 5/5   EHL: L5 4/5 4/5   Ankle plantarflexion: S1 5/5 5/5     Sensation from L1-S2 is preserved.    Laboratory Data:  Lab Results   Component Value Date    WBC 19.8 (H) 02/03/2023    HGB 14.2 02/03/2023     02/03/2023      Images:  No new images were obtained.    Assessment & Plan:   Yobani Baldwin is a 42 year old male status post L4-S1 decompression on 2/2/2023 with Dr. Verde.    Goals for 02/04/23:  - Pain control and mobilization  - Discharge to home    Orthopedic Surgery Primary  - Activity: Up with assist until independent; no excessive bending or twisting; no lifting >10 lbs x 6 weeks  - Weightbearing Status: WBAT  - Pain Management: Transition from IV to PO as tolerated; no NSAIDs  - Antibiotics: Ancef while drains remain; complete  - Diet: Begin with clear fluids and progress diet as tolerated  - DVT Prophylaxis: SCDs only; no chemical DVT prophylaxis needed  - Imaging: None pending  - Labs: Labs PRN  - Bracing/Splinting: None  - Dressings: Keep dressing C/D/I x 2 days  - Drains: Document output per shift; will be discontinued at Orthopedic Surgery discretion; removed  - Lilly Catheter: None  - Physical Therapy/Occupational Therapy: Evaluation and treatment  - Cultures: " None pending  - Consults: Hospitalist  - Follow-up: Clinic with Dr. Verde in 6 weeks with repeat radiographs    - Disposition: Pending progress with therapies, pain control on orals, and medical stability; anticipate discharge to home today.    Orthopedic Surgery staff for this patient is Dr. Verde.    ------------------------------------------------------------------------------------------    [X] Drains discontinued.  [N/A] Postoperative radiographs complete.  [X] Discharge orders complete.  [   ] Discharge summary started.    Clifford Pereira MD  Orthopedic Surgery PGY4

## 2023-02-04 NOTE — PLAN OF CARE
Occupational Therapy: Orders received. Chart reviewed and discussed with care team.? Occupational Therapy not indicated as patient discharging home soon, PT followed patient for mobility, provided education on ADLs within precautions, pt will have assist from family/friends as needed.? Defer discharge recommendations to PT.? Will complete orders.

## 2023-02-04 NOTE — PROGRESS NOTES
[facility]    Medicine Progress Note - Hospitalist Service, GOLD TEAM 19    Date of Admission:  2/2/2023    Assessment & Plan     #Status post lumbosacral spinal surgery  Ongoing pain control issues, slightly improved this morning.  Was not taking narcotics preoperatively  Recommended, per Ortho.  Would consider trial of steroids, will defer to orthopedics    #Urinary retention.  Hesitancy, improved  #Constipation  Plan: Bowel regimen, monitor bladder function     Diet: Regular Diet Adult  Diet    DVT Prophylaxis: Pneumatic Compression Devices  Lilly Catheter: Not present  Lines: None     Cardiac Monitoring: None  Code Status: Full Code           Jarad Emery MD, [facility]  Hospitalist Service, GOLD TEAM 19  [facility]  Securely message with [employer] (more info)  Text page via [employer] Paging/Directory   See signed in provider for up to date coverage information  ______________________________________________________________________    Interval History   Patient notes continued back and bilateral lower extremity pain.  May be slightly improved this morning.  He was able to walk to the bathroom last evening.  No complaints of chest pain, shortness breath, nausea, vomiting.  Has some urinary hesitancy but ultimately has been able to empty his bladder.  He is not passing flatus nor had a bowel movement.    Physical Exam   Vital Signs: Temp: 98.6  F (37  C) Temp src: Oral BP: 134/72 Pulse: 64   Resp: 19 SpO2: 100 % O2 Device: None (Room air)    Weight: 194 lbs 3.6 oz    Sleepy, no acute distress  He is alert, is fully oriented, pleasant  Lungs clear  CV RRR  Abdomen soft, nondistended, nontender.  Active bowel sounds  No lower extremity edema  Spine and lower extremity neuro exam deferred to spine team

## 2023-02-06 NOTE — DISCHARGE SUMMARY
[facility]  Orthopedic Surgery Discharge Summary     Date of Admission: 2/2/2023  Date of Discharge: 2/4/2023  4:39 PM  Disposition: Home  Staff Physician: Florin Verde  Primary Care Provider: Jaciel Wilde    ADMISSION DIAGNOSIS:  Lumbar radiculopathy [M54.16]    DISCHARGE DIAGNOSIS:  Lumbar radiculopathy [M54.16]    PROCEDURES: Procedure(s):  Right Far Lateral lumbar 4-5 discetomy and midline Lumbar 5 to sacral 1 decompression on 2/2/2023    BRIEF HISTORY:  Yobani Baldwin is a 42 year old male who elected surgical treatment, and understood the indications for this surgery, as well as its risks, benefits, and alternatives as documented in the pre-operative H&P. Specifically, we reviewed the risks and benefits of the surgery in detail. The risks include, but are not limited to, the general risks associated with anesthesia, including death, pulmonary embolism, DVT, stroke, myocardial infarction, pneumonia, and urinary tract infection. Additional risks specific to the surgery include the risk of infection, dural tear with resultant CSF leak which might necessitate placement of a drain or revision surgery or could result in headaches, nerve injury resulting in weakness or paralysis, risk of adjacent segment disease, the risks of vascular injury, need for revision surgery in the future due to one of the above issues, or risk of incomplete symptom relief. Yobani Baldwin understands the risks of the surgery and wishes to proceed. No Guarantees were given.       HOSPITAL COURSE:  The patient was admitted following the above listed procedures for pain control and rehabilitation. Yobani Baldwin did well post-operatively. Medicine was consulted post operatively to aid in management of medical co-morbidities. The patient received routine nursing cares and at the time of discharge was medically stable. Vital signs were stable throughout admission. The patient is  "tolerating a regular diet and is voiding spontaneously. All PT/OT goals have been met for safe mobility. Pain is now controlled on oral medications which will be available on discharge. Stool softeners have been used while taking pain medications to help prevent constipation. Yobani Baldwin is deemed medically safe to discharge on POD2.     Antibiotics:  Ancef given periop and 24 hours postop.  DVT Prophylaxis:  Mechanical prophylaxis initiated after surgery.   PT Progress:  Has met PT/OT goals for safe mobility.   Pain Meds:  Weaned off all IV pain meds by discharge.  Inpatient Events: No significant events or complications.    PHYSICAL EXAM:  BP (!) 137/91 (BP Location: Left arm)   Pulse 84   Temp 98.8  F (37.1  C) (Oral)   Resp 18   Ht 1.803 m (5' 11\")   Wt 88.1 kg (194 lb 3.6 oz)   SpO2 97%   BMI 27.09 kg/m    No intake/output data recorded.  General: NAD. Resting comfortably in bed.  Respiratory: Breathing comfortably on RA.  Drain Output: No drain.  Musculoskeletal: Dressing C/D/I.     Motor Strength Right Left   Knee flexion: S1 5/5 5/5   Knee extension: L3, L4 5/5 5/5   Ankle dosiflexion: L4, L5 5/5 5/5   EHL: L5 4/5 4/5   Ankle plantarflexion: S1 5/5 5/5      Sensation from L1-S2 is preserved.    FOLLOWUP:  Follow up with Dr. Verde at 6 weeks postoperatively.    No future appointments.  Orthopedic surgery appointments are at the [facility] [place] ([address] [place] [address]). Call [phone] to schedule a follow-up appointment at this location with your provider.     PLANNED DISCHARGE ORDERS:     DVT Prophylaxis: No DVT prophylaxis indicated.     Activity: See below.     Wound Care: See below.      Discharge Medication List as of 2/4/2023  3:42 PM      START taking these medications    Details   hydrOXYzine (ATARAX) 25 MG tablet Take 1 tablet (25 mg) by mouth every 6 hours as needed for itching (and nausea), Disp-30 tablet, R-0, E-Prescribe      !! " methocarbamol (ROBAXIN) 750 MG tablet Take 1 tablet (750 mg) by mouth every 6 hours as needed for muscle spasms (muscle spasm), Disp-60 tablet, R-0, E-Prescribe      oxyCODONE (ROXICODONE) 5 MG tablet Take 1 tablet (5 mg) by mouth every 6 hours as needed for severe pain (7-10), Disp-26 tablet, R-0, E-Prescribe      senna-docusate (SENOKOT-S/PERICOLACE) 8.6-50 MG tablet Take 1 tablet by mouth daily, Disp-30 tablet, R-0, E-Prescribe       !! - Potential duplicate medications found. Please discuss with provider.      CONTINUE these medications which have CHANGED    Details   acetaminophen (TYLENOL) 325 MG tablet Take 2 tablets (650 mg) by mouth every 4 hours as needed for mild pain, Disp-50 tablet, R-0, E-Prescribe         CONTINUE these medications which have NOT CHANGED    Details   ibuprofen (ADVIL/MOTRIN) 800 MG tablet Take 800 mg by mouth every 8 hours as needed, Historical      melatonin 5 MG tablet Take 5 mg by mouth nightly as needed for sleep, Historical      !! methocarbamol (ROBAXIN) 500 MG tablet Take 500-1,000 mg by mouth 4 times daily as needed for muscle spasms, Historical       !! - Potential duplicate medications found. Please discuss with provider.      STOP taking these medications       cyclobenzaprine (FLEXERIL) 10 MG tablet Comments:   Reason for Stopping:              Discharge Procedure Orders   Physical Therapy Referral   Standing Status: Future   Referral Priority: Routine: Next available opening Referral Type: Rehab Therapy Physical Therapy   Number of Visits Requested: 1     Discharge Instructions   Order Comments: Review outpatient procedure discharge instructions as directed by Provider.     Discharge Instructions - Change dressing   Order Comments: Removed dressing 48 hours from date of surgery. Wash hands prior to changing dressing daily. If no drainage on dressing, may leave open to air.    There is glue over the incision. Do not pick at the glue. This will come off on its own.     No  driving or operating machinery while in a cervical collar   Order Comments: Until follow-up appointment.    No driving while on Narcotics     Notify Provider   Order Comments: Signs and symptoms of infection: Fever greater than 101, redness, swelling, heat at site, drainage, or pus     Discharge Instructions - Shower with incision NOT covered   Order Comments: You may shower 4 days after surgery.  You do not need to cover your surgical incision in the shower and may allow water and soap to run over top of the incision. Do not soak or submerge the incision underwater.     Discharge Instructions - No tub bathing   Order Comments: Tub bathing, swimming, or any other activities that will cause your incision to be submerged in water should be avoided until allowed by your Provider.     Discharge Instructions - Lifting Limit (specify)   Order Comments: Lifting limit of  10 pounds until seen at Post-op follow up appointment.     Return to Clinic - in 2 weeks   Order Comments: Return to Clinic in 6 weeks     Discharge Instructions   Order Comments: Check with Provider for instructions about when to start anticoagulant medication.     No Alcohol   Order Comments: No Alcohol for 24 hours after procedure     No Aspirin or NSAID products   Order Comments: No aspirin or non-steroidal anti-inflammatory drugs (NSAIDs) such as ibuprofen or naproxen until cleared at post-operative appointment     Reason for your hospital stay   Order Comments: Yobani Baldwin is a 42 year old male now s/p L4-S1 decompression on 2/2 with Dr. Verde.     Activity   Order Comments: Your activity upon discharge: activity as tolerated    Activity:   - Up with assist until independent. No excessive bending or twisting. No lifting >10 lbs x 6 weeks.   Weight bearing status: WBAT     Order Specific Question Answer Comments   Is discharge order? Yes      When to contact your care team   Order Comments: Call Dr. Verde  if you have any of the following:  temperature greater than 101.3  or less than 96.5,  increased shortness of breath, increased drainage, increased swelling, or increased pain.    Contact phone number is      Wound care and dressings   Order Comments: Instructions to care for your wound at home: ice to area for comfort, keep wound clean and dry, may get incision wet in shower but do not soak or scrub, reinforce dressing as needed, and remove dressing in 7 days.     [facility]ult [facility] Follow-up and recommended labs and tests   Order Comments: Follow up with Dr Verde as scheduled.      Appointments at [facility] at [address] [place] [id number] ([facility] [place])     Call [phone] if you haven't heard regarding these appointments within 7 days of discharge.     Walker Order   Order Comments: I, the undersigned, certify that the above prescribed supplies are medically necessary for this patient and is both reasonable and necessary in reference to accepted standards of medical and necessary in reference to accepted standards of medical practice in the treatment of this patient's condition and is not prescribed as a convenience.      Order Specific Question Answer Comments   DME Provider: [facility]    Start Date: 2/4/2023    Walker Type: Standard (2 Wheel)    Diagnosis: R26.89 - Impaired Gait and Mobility    Accessories: Walker Wheels      Diet   Order Comments: Follow this diet upon discharge: Orders Placed This Encounter      Regular Diet Adult     Order Specific Question Answer Comments   Is discharge order? Yes      Clifford Pereira MD  Orthopedic Surgery PGY4

## 2023-02-21 ENCOUNTER — OFFICE VISIT (OUTPATIENT)
Dept: ORTHOPEDICS | Facility: CLINIC | Age: 43
End: 2023-02-21
Payer: COMMERCIAL

## 2023-02-21 DIAGNOSIS — Z98.890 S/P LUMBAR DISCECTOMY: Primary | ICD-10-CM

## 2023-02-21 PROCEDURE — 99024 POSTOP FOLLOW-UP VISIT: CPT | Performed by: ORTHOPAEDIC SURGERY

## 2023-02-21 RX ORDER — METHYLPREDNISOLONE 4 MG
TABLET, DOSE PACK ORAL
Qty: 21 TABLET | Refills: 0 | Status: SHIPPED | OUTPATIENT
Start: 2023-02-21 | End: 2023-07-05

## 2023-02-21 NOTE — LETTER
2/21/2023         RE: Yobani Baldwin  [address]  [place] [place] [address]        Dear Colleague,    Thank you for referring your patient, Yobani Baldwin, to the [facility] [place]. Please see a copy of my visit note below.    Spine Surgery Return Clinic Visit      Chief Complaint:   RECHECK (Patient returns 2w 5d s/p Right far lateral lumbar 4-5 discectomy and midline lumbar 5-sacral 1 decompression.)      Interval HPI:  Symptom Profile Including: location of symptoms, onset, severity, exacerbating/alleviating factors, previous treatments:        Yobani Baldwin is a 42 year old male who returns today 2 weeks status post discectomy.  He is having some back pain.  He also feels tender over the greater trochanters particular when he lies on them.            Past Medical History:     Past Medical History:   Diagnosis Date     Back pain      Lumbar radiculopathy             Past Surgical History:     Past Surgical History:   Procedure Laterality Date     COLONOSCOPY N/A 02/09/2022    Procedure: COLONOSCOPY, FLEXIBLE, WITH LESION REMOVAL USING SNARE;  Surgeon: Cory Grande MD;  Location:  GI     DECOMPRESSION LUMBAR ONE LEVEL N/A 2/2/2023    Procedure: Right Far Lateral lumbar 4-5 discetomy and midline Lumbar 5 to sacral 1 decompression;  Surgeon: Florin Verde MD;  Location:  OR     DENTAL SURGERY      wisdom teeth     ESOPHAGOSCOPY, GASTROSCOPY, DUODENOSCOPY (EGD), COMBINED N/A 02/09/2022    Procedure: Esophagoscopy, gastroscopy, duodenoscopy (EGD), combined;  Surgeon: Cory Grande MD;  Location:  GI            Social History:     Social History     Tobacco Use     Smoking status: Some Days     Types: Cigars     Smokeless tobacco: Never     Tobacco comments:     sociallly   Substance Use Topics     Alcohol use: Yes     Alcohol/week: 0.0 standard drinks     Comment: socially            Family History:     Family History    Problem Relation Age of Onset     Mental Illness Mother      Family History Negative Father      Diabetes No family hx of      Coronary Artery Disease No family hx of      Hypertension No family hx of      Hyperlipidemia No family hx of      Cerebrovascular Disease No family hx of      Breast Cancer No family hx of      Colon Cancer No family hx of      Prostate Cancer No family hx of      Other Cancer No family hx of      Depression No family hx of      Anxiety Disorder No family hx of      Substance Abuse No family hx of      Anesthesia Reaction No family hx of      Asthma No family hx of      Osteoporosis No family hx of      Genetic Disorder No family hx of      Thyroid Disease No family hx of      Obesity No family hx of      Unknown/Adopted No family hx of      Deep Vein Thrombosis (DVT) No family hx of             Allergies:   No Known Allergies         Medications:     Current Outpatient Medications   Medication     acetaminophen (TYLENOL) 325 MG tablet     hydrOXYzine (ATARAX) 25 MG tablet     ibuprofen (ADVIL/MOTRIN) 800 MG tablet     melatonin 5 MG tablet     methocarbamol (ROBAXIN) 500 MG tablet     methocarbamol (ROBAXIN) 750 MG tablet     oxyCODONE (ROXICODONE) 5 MG tablet     senna-docusate (SENOKOT-S/PERICOLACE) 8.6-50 MG tablet     No current facility-administered medications for this visit.             Review of Systems:   A focused musculoskeletal and neurologic ROS was performed with pertinent positives and negatives noted in the HPI.  Additional systems were also reviewed and are documented at the bottom of the note.         Physical Exam:   Vitals: There were no vitals taken for this visit.  Musculoskeletal, Neurologic, and Spine:        Lumbar Spine:    Appearance - No gross stepoffs or deformities    Motor -     L2-3: Hip flexion 5/5 R and 5/5 L strength          L3/4:  Knee extension R 5/5 and L 5/5 strength         L4/5:  Foot dorsiflexion R 5/5 L 5/5 and       EHL dorsiflexion R 4/5 L  4/5 strength         S1:  Plantarflexion/Peroneal Muscles  R 5/5 and L 5/5 strength    Sensation: intact to light touch L3-S1 distribution BLE          Hip Exam:   Pain and tenderness over bilateral greater trochanters    Alignment:  Patient stands with a neutral standing sagittal balance.           Imaging:          Assessment and Plan:     42 year old male status post lumbar decompression.    I am going to give him an oral Medrol Dosepak for some of the aches and pains.  He can do low impact cardio at this time and he has therapy set up starting on Thursday which is okay.  Follow-up in 4 weeks.  I think he will continue to make progress in the interim.           Respectfully,  Florin Verde MD  Spine Surgery  [facility]

## 2023-02-21 NOTE — NURSING NOTE
Reason For Visit:   Chief Complaint   Patient presents with     RECHECK     Patient returns 2w 5d s/p Right far lateral lumbar 4-5 discectomy and midline lumbar 5-sacral 1 decompression.       Primary MD: Jaciel Wilde  Ref. MD: milagro    Date of surgery: 2/2/23  Type of surgery: R. Far lateral L4-5 discectomy, L5-S1 decompression      There were no vitals taken for this visit.    Pain Assessment  Patient Currently in Pain: Yes  0-10 Pain Scale: 5  Primary Pain Location: Back    Oswestry (AMINAH) Questionnaire    OSWESTRY DISABILITY INDEX 4/20/2022   Count 9   Sum 22   Oswestry Score (%) 48.89   Some recent data might be hidden            Neck Disability Index (NDI) Questionnaire    No flowsheet data found.                Promis 10 Assessment    PROMIS 10 4/20/2022   In general, would you say your health is: Good   In general, would you say your quality of life is: Fair   In general, how would you rate your physical health? Fair   In general, how would you rate your mental health, including your mood and your ability to think? Good   In general, how would you rate your satisfaction with your social activities and relationships? Fair   In general, please rate how well you carry out your usual social activities and roles Fair   To what extent are you able to carry out your everyday physical activities such as walking, climbing stairs, carrying groceries, or moving a chair? Moderately   How often have you been bothered by emotional problems such as feeling anxious, depressed or irritable? Sometimes   How would you rate your fatigue on average? Mild   How would you rate your pain on average?   0 = No Pain  to  10 = Worst Imaginable Pain 7   In general, would you say your health is: 3   In general, would you say your quality of life is: 2   In general, how would you rate your physical health? 2   In general, how would you rate your mental health, including your mood and your ability to think? 3   In general, how  would you rate your satisfaction with your social activities and relationships? 2   In general, please rate how well you carry out your usual social activities and roles. (This includes activities at home, at work and in your community, and responsibilities as a parent, child, spouse, employee, friend, etc.) 2   To what extent are you able to carry out your everyday physical activities such as walking, climbing stairs, carrying groceries, or moving a chair? 3   In the past 7 days, how often have you been bothered by emotional problems such as feeling anxious, depressed, or irritable? 3   In the past 7 days, how would you rate your fatigue on average? 2   In the past 7 days, how would you rate your pain on average, where 0 means no pain, and 10 means worst imaginable pain? 7   Global Mental Health Score 10   Global Physical Health Score 11   PROMIS TOTAL - SUBSCORES 21   Some recent data might be hidden                Norris Quiros ATC

## 2023-02-21 NOTE — PROGRESS NOTES
Spine Surgery Return Clinic Visit      Chief Complaint:   RECHECK (Patient returns 2w 5d s/p Right far lateral lumbar 4-5 discectomy and midline lumbar 5-sacral 1 decompression.)      Interval HPI:  Symptom Profile Including: location of symptoms, onset, severity, exacerbating/alleviating factors, previous treatments:        Yobani Baldwin is a 42 year old male who returns today 2 weeks status post discectomy.  He is having some back pain.  He also feels tender over the greater trochanters particular when he lies on them.            Past Medical History:     Past Medical History:   Diagnosis Date     Back pain      Lumbar radiculopathy             Past Surgical History:     Past Surgical History:   Procedure Laterality Date     COLONOSCOPY N/A 02/09/2022    Procedure: COLONOSCOPY, FLEXIBLE, WITH LESION REMOVAL USING SNARE;  Surgeon: Cory Grande MD;  Location:  GI     DECOMPRESSION LUMBAR ONE LEVEL N/A 2/2/2023    Procedure: Right Far Lateral lumbar 4-5 discetomy and midline Lumbar 5 to sacral 1 decompression;  Surgeon: Florin Verde MD;  Location:  OR     DENTAL SURGERY      wisdom teeth     ESOPHAGOSCOPY, GASTROSCOPY, DUODENOSCOPY (EGD), COMBINED N/A 02/09/2022    Procedure: Esophagoscopy, gastroscopy, duodenoscopy (EGD), combined;  Surgeon: Cory Grande MD;  Location:  GI            Social History:     Social History     Tobacco Use     Smoking status: Some Days     Types: Cigars     Smokeless tobacco: Never     Tobacco comments:     sociallly   Substance Use Topics     Alcohol use: Yes     Alcohol/week: 0.0 standard drinks     Comment: socially            Family History:     Family History   Problem Relation Age of Onset     Mental Illness Mother      Family History Negative Father      Diabetes No family hx of      Coronary Artery Disease No family hx of      Hypertension No family hx of      Hyperlipidemia No family hx of      Cerebrovascular Disease No family hx  of      Breast Cancer No family hx of      Colon Cancer No family hx of      Prostate Cancer No family hx of      Other Cancer No family hx of      Depression No family hx of      Anxiety Disorder No family hx of      Substance Abuse No family hx of      Anesthesia Reaction No family hx of      Asthma No family hx of      Osteoporosis No family hx of      Genetic Disorder No family hx of      Thyroid Disease No family hx of      Obesity No family hx of      Unknown/Adopted No family hx of      Deep Vein Thrombosis (DVT) No family hx of             Allergies:   No Known Allergies         Medications:     Current Outpatient Medications   Medication     acetaminophen (TYLENOL) 325 MG tablet     hydrOXYzine (ATARAX) 25 MG tablet     ibuprofen (ADVIL/MOTRIN) 800 MG tablet     melatonin 5 MG tablet     methocarbamol (ROBAXIN) 500 MG tablet     methocarbamol (ROBAXIN) 750 MG tablet     oxyCODONE (ROXICODONE) 5 MG tablet     senna-docusate (SENOKOT-S/PERICOLACE) 8.6-50 MG tablet     No current facility-administered medications for this visit.             Review of Systems:   A focused musculoskeletal and neurologic ROS was performed with pertinent positives and negatives noted in the HPI.  Additional systems were also reviewed and are documented at the bottom of the note.         Physical Exam:   Vitals: There were no vitals taken for this visit.  Musculoskeletal, Neurologic, and Spine:        Lumbar Spine:    Appearance - No gross stepoffs or deformities    Motor -     L2-3: Hip flexion 5/5 R and 5/5 L strength          L3/4:  Knee extension R 5/5 and L 5/5 strength         L4/5:  Foot dorsiflexion R 5/5 L 5/5 and       EHL dorsiflexion R 4/5 L 4/5 strength         S1:  Plantarflexion/Peroneal Muscles  R 5/5 and L 5/5 strength    Sensation: intact to light touch L3-S1 distribution BLE          Hip Exam:   Pain and tenderness over bilateral greater trochanters    Alignment:  Patient stands with a neutral standing sagittal  balance.           Imaging:          Assessment and Plan:     42 year old male status post lumbar decompression.    I am going to give him an oral Medrol Dosepak for some of the aches and pains.  He can do low impact cardio at this time and he has therapy set up starting on Thursday which is okay.  Follow-up in 4 weeks.  I think he will continue to make progress in the interim.           Respectfully,  Florin Verde MD  Spine Surgery  [facility]

## 2023-02-28 ENCOUNTER — THERAPY VISIT (OUTPATIENT)
Dept: PHYSICAL THERAPY | Facility: CLINIC | Age: 43
End: 2023-02-28
Attending: ORTHOPAEDIC SURGERY
Payer: COMMERCIAL

## 2023-02-28 DIAGNOSIS — Z98.890 STATUS POST LUMBAR SURGERY: ICD-10-CM

## 2023-02-28 DIAGNOSIS — G89.29 CHRONIC BILATERAL LOW BACK PAIN WITH RIGHT-SIDED SCIATICA: ICD-10-CM

## 2023-02-28 DIAGNOSIS — Z98.890 S/P LUMBAR DISCECTOMY: ICD-10-CM

## 2023-02-28 DIAGNOSIS — M54.16 LUMBAR RADICULOPATHY: ICD-10-CM

## 2023-02-28 DIAGNOSIS — M54.41 CHRONIC BILATERAL LOW BACK PAIN WITH RIGHT-SIDED SCIATICA: ICD-10-CM

## 2023-02-28 PROCEDURE — 97161 PT EVAL LOW COMPLEX 20 MIN: CPT | Mod: GP | Performed by: PHYSICAL THERAPIST

## 2023-02-28 PROCEDURE — 97110 THERAPEUTIC EXERCISES: CPT | Mod: GP | Performed by: PHYSICAL THERAPIST
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
## 2023-02-28 NOTE — PROGRESS NOTES
[facility]    OUTPATIENT Physical Therapy ORTHOPEDIC EVALUATION  PLAN OF TREATMENT FOR OUTPATIENT REHABILITATION  (COMPLETE FOR INITIAL CLAIMS ONLY)  Patient's Last Name, First Name, M.I.  YOB: 1980  Yobani Baldwin    Provider s Name:  [facility]   Medical Record No.  [id number]   Start of Care Date:  02/28/23   Onset Date:  02/02/23    Treatment Diagnosis:  s/p L4-5 discectomy with decompression Medical Diagnosis:     Lumbar radiculopathy  S/P lumbar discectomy  Status post lumbar surgery  Chronic bilateral low back pain with right-sided sciatica       Goals:     02/28/23 0500   Body Part   Goals listed below are for s/p lumbar discectomy   Goal #1   Goal #1 lifting/carrying   Previous Functional Level No restrictions   Current Functional Level Cannot lift   Performance level anything over 10# (post-surgical restrictions)   STG [place] Performance Lift an item to counter height weighing   Performance level 10#   Rationale for housework such as laundry, emptying garbage, use of    Due date 03/28/23   LTG [place] Performance Lift an item off floor to seat height weighing   Performance Level 20#   Rationale to perform job duties at work   Due date 04/25/23         Therapy Frequency:     Predicted Duration of Therapy Intervention:       Cheryl Barahona, PT                 I CERTIFY THE NEED FOR THESE SERVICES FURNISHED UNDER        THIS PLAN OF TREATMENT AND WHILE UNDER MY CARE     (Physician attestation of this document indicates review and certification of the therapy plan).                     Certification Date From:  02/28/23   Certification Date To:  05/28/23    Referring Provider:  Florin Garg*    Initial Assessment        See Epic Evaluation SOC Date: 02/28/23

## 2023-03-07 ENCOUNTER — THERAPY VISIT (OUTPATIENT)
Dept: PHYSICAL THERAPY | Facility: CLINIC | Age: 43
End: 2023-03-07
Payer: COMMERCIAL

## 2023-03-07 DIAGNOSIS — G89.29 CHRONIC BILATERAL LOW BACK PAIN WITH RIGHT-SIDED SCIATICA: ICD-10-CM

## 2023-03-07 DIAGNOSIS — M54.41 CHRONIC BILATERAL LOW BACK PAIN WITH RIGHT-SIDED SCIATICA: ICD-10-CM

## 2023-03-07 DIAGNOSIS — Z98.890 STATUS POST LUMBAR SURGERY: Primary | ICD-10-CM

## 2023-03-07 PROCEDURE — 97110 THERAPEUTIC EXERCISES: CPT | Mod: GP | Performed by: PHYSICAL THERAPIST

## 2023-03-14 ENCOUNTER — THERAPY VISIT (OUTPATIENT)
Dept: PHYSICAL THERAPY | Facility: CLINIC | Age: 43
End: 2023-03-14
Payer: COMMERCIAL

## 2023-03-14 DIAGNOSIS — G89.29 CHRONIC BILATERAL LOW BACK PAIN WITH RIGHT-SIDED SCIATICA: ICD-10-CM

## 2023-03-14 DIAGNOSIS — M54.41 CHRONIC BILATERAL LOW BACK PAIN WITH RIGHT-SIDED SCIATICA: ICD-10-CM

## 2023-03-14 DIAGNOSIS — Z98.890 STATUS POST LUMBAR SURGERY: Primary | ICD-10-CM

## 2023-03-14 PROCEDURE — 97110 THERAPEUTIC EXERCISES: CPT | Mod: GP | Performed by: PHYSICAL THERAPIST

## 2023-03-21 ENCOUNTER — THERAPY VISIT (OUTPATIENT)
Dept: PHYSICAL THERAPY | Facility: CLINIC | Age: 43
End: 2023-03-21
Payer: COMMERCIAL

## 2023-03-21 DIAGNOSIS — M54.41 CHRONIC BILATERAL LOW BACK PAIN WITH RIGHT-SIDED SCIATICA: ICD-10-CM

## 2023-03-21 DIAGNOSIS — Z98.890 STATUS POST LUMBAR SURGERY: Primary | ICD-10-CM

## 2023-03-21 DIAGNOSIS — G89.29 CHRONIC BILATERAL LOW BACK PAIN WITH RIGHT-SIDED SCIATICA: ICD-10-CM

## 2023-03-21 PROCEDURE — 97140 MANUAL THERAPY 1/> REGIONS: CPT | Mod: GP | Performed by: PHYSICAL THERAPIST

## 2023-03-21 PROCEDURE — 97110 THERAPEUTIC EXERCISES: CPT | Mod: GP | Performed by: PHYSICAL THERAPIST

## 2023-03-21 NOTE — PROGRESS NOTES
Subjective:  HPI  Physical Exam                    Objective:  System         Lumbar/SI Evaluation    Lumbar Myotomes:    T12-L3 (Hip Flex):  Left: 5    Right: 5  L2-4 (Quads):  Left:  5    Right:  5  L4 (Ankle DF):  Left:  5    Right:  5                                                        Hip Evaluation    Hip Strength:    Flexion:   Left: 5/5   Pain:  Right: 5/5   Pain:                    Extension:  Left: 5/5  Pain:Right: 5/5    Pain:    Abduction:  Left: 5/5     Pain:Right: 5/5    Pain:                                   Tomasz Lumbar Evaluation      Movement Loss:  Flexion (Flex): min  Extension (EXT): min  Side Glide R (SG R): min and pain  Side [place] L (SG L): min                                               ROS    Assessment/Plan:    PROGRESS  REPORT    Progress reporting period is from 2/28/23 to 3/21/23.       SUBJECTIVE  Subjective changes noted by patient:  Patient reports symptoms are improving gradually.  Pain remains in right low back, less intense overall.         Current pain level is 5/10  .     Previous pain level was  6/10 Initial Pain level: 6/10.   Changes in function:  Yes (See Goal flowsheet attached for changes in current functional level)  Adverse reaction to treatment or activity: None    OBJECTIVE  Changes noted in objective findings:  Yes, increased lumbar AROM, increasing lumbopelvic strength.        ASSESSMENT/PLAN  Updated problem list and treatment plan: Diagnosis 1:  S/p L4-5 discectomy with decompression  Pain -  self management, education and home program  Decreased ROM/flexibility - therapeutic exercise and home program  Decreased strength - therapeutic exercise, therapeutic activities and home program  STG/LTGs have been met or progress has been made towards goals:  Yes (See Goal flow sheet completed today.)  Assessment of Progress: The patient's condition is improving.  Self Management Plans:  Patient has been instructed in a home treatment program.  I have re-evaluated  this patient and find that the nature, scope, duration and intensity of the therapy is appropriate for the medical condition of the patient.  [place] continues to require the following intervention to meet STG and LTG's:  PT    Recommendations:  This patient would benefit from continued therapy.     Frequency:  2 X a month, once daily  Duration:  for 1-2 months        Please refer to the daily flowsheet for treatment today, total treatment time and time spent performing 1:1 timed codes.

## 2023-03-23 DIAGNOSIS — Z98.890 S/P LUMBAR DISCECTOMY: Primary | ICD-10-CM

## 2023-03-27 ENCOUNTER — ANCILLARY PROCEDURE (OUTPATIENT)
Dept: GENERAL RADIOLOGY | Facility: CLINIC | Age: 43
End: 2023-03-27
Attending: ORTHOPAEDIC SURGERY
Payer: COMMERCIAL

## 2023-03-27 ENCOUNTER — OFFICE VISIT (OUTPATIENT)
Dept: ORTHOPEDICS | Facility: CLINIC | Age: 43
End: 2023-03-27
Payer: COMMERCIAL

## 2023-03-27 DIAGNOSIS — M54.16 LUMBAR RADICULOPATHY: Primary | ICD-10-CM

## 2023-03-27 DIAGNOSIS — Z98.890 S/P LUMBAR DISCECTOMY: ICD-10-CM

## 2023-03-27 PROCEDURE — 99024 POSTOP FOLLOW-UP VISIT: CPT | Performed by: ORTHOPAEDIC SURGERY

## 2023-03-27 PROCEDURE — 72110 X-RAY EXAM L-2 SPINE 4/>VWS: CPT | Performed by: STUDENT IN AN ORGANIZED HEALTH CARE EDUCATION/TRAINING PROGRAM

## 2023-03-27 NOTE — LETTER
March 27, 2023    RE:  Yobani Baldwin                              [address]  [facility] [id number]            To whom it may concern:    Yobani Baldwin is under my professional care for Lumbar radiculopathy. He may return to work with the following: The employee is ABLE to return to work today.    When the patient returns to work:  A) Bend: Frequently (4-8 hours)  B) Squat: Frequently (4-8 hours)  C) Walk/Stand: Frequently (4-8 hours)  D) Reach Above Shoulders: Frequently (4-8 hours)  E) Lift, carry, push, and pull no more than:  Light duty- patient is to gradually return to heavy lifting and endurance as he works with Physical Therapy.      Sincerely,      Electronically Signed by  Florin Verde MD

## 2023-03-27 NOTE — NURSING NOTE
Reason For Visit:   Chief Complaint   Patient presents with     RECHECK     Patient returns 7w 4d s/p right far lateral lumbar 4-5 discectomy and midline lumbar 5-sacral 1 decompression.       Primary MD: Jaciel Wilde  Ref. MD: milagro      Date of surgery: 2/2/23  Type of surgery: Right far lateral L4-5 discectomy and midline L5-S1 decompression.      There were no vitals taken for this visit.    Pain Assessment  Patient Currently in Pain: Yes  0-10 Pain Scale: 3  Primary Pain Location: Back    Oswestry (AMINAH) Questionnaire    OSWESTRY DISABILITY INDEX 3/27/2023   Count 10   Sum 10   Oswestry Score (%) 20   Some recent data might be hidden            Neck Disability Index (NDI) Questionnaire    No flowsheet data found.                Promis 10 Assessment    PROMIS 10 4/20/2022   In general, would you say your health is: Good   In general, would you say your quality of life is: Fair   In general, how would you rate your physical health? Fair   In general, how would you rate your mental health, including your mood and your ability to think? Good   In general, how would you rate your satisfaction with your social activities and relationships? Fair   In general, please rate how well you carry out your usual social activities and roles Fair   To what extent are you able to carry out your everyday physical activities such as walking, climbing stairs, carrying groceries, or moving a chair? Moderately   How often have you been bothered by emotional problems such as feeling anxious, depressed or irritable? Sometimes   How would you rate your fatigue on average? Mild   How would you rate your pain on average?   0 = No Pain  to  10 = Worst Imaginable Pain 7   In general, would you say your health is: 3   In general, would you say your quality of life is: 2   In general, how would you rate your physical health? 2   In general, how would you rate your mental health, including your mood and your ability to think? 3   In  general, how would you rate your satisfaction with your social activities and relationships? 2   In general, please rate how well you carry out your usual social activities and roles. (This includes activities at home, at work and in your community, and responsibilities as a parent, child, spouse, employee, friend, etc.) 2   To what extent are you able to carry out your everyday physical activities such as walking, climbing stairs, carrying groceries, or moving a chair? 3   In the past 7 days, how often have you been bothered by emotional problems such as feeling anxious, depressed, or irritable? 3   In the past 7 days, how would you rate your fatigue on average? 2   In the past 7 days, how would you rate your pain on average, where 0 means no pain, and 10 means worst imaginable pain? 7   Global Mental Health Score 10   Global Physical Health Score 11   PROMIS TOTAL - SUBSCORES 21   Some recent data might be hidden                Norris Quiros ATC

## 2023-03-27 NOTE — PROGRESS NOTES
Spine Surgery Return Clinic Visit      Chief Complaint:   RECHECK (Patient returns 7w 4d s/p right far lateral lumbar 4-5 discectomy and midline lumbar 5-sacral 1 decompression.)      Interval HPI:  Symptom Profile Including: location of symptoms, onset, severity, exacerbating/alleviating factors, previous treatments:        Yobani Baldwin is a 42 year old male who returns today about 7 weeks status post lumbar decompression.  His leg symptoms are totally resolved.  He is dealing with some back stiffness.  He has a number of important questions regard return to work.            Past Medical History:     Past Medical History:   Diagnosis Date     Back pain      Lumbar radiculopathy             Past Surgical History:     Past Surgical History:   Procedure Laterality Date     COLONOSCOPY N/A 02/09/2022    Procedure: COLONOSCOPY, FLEXIBLE, WITH LESION REMOVAL USING SNARE;  Surgeon: Cory Grande MD;  Location:  GI     DECOMPRESSION LUMBAR ONE LEVEL N/A 2/2/2023    Procedure: Right Far Lateral lumbar 4-5 discetomy and midline Lumbar 5 to sacral 1 decompression;  Surgeon: Florin Verde MD;  Location:  OR     DENTAL SURGERY      wisdom teeth     ESOPHAGOSCOPY, GASTROSCOPY, DUODENOSCOPY (EGD), COMBINED N/A 02/09/2022    Procedure: Esophagoscopy, gastroscopy, duodenoscopy (EGD), combined;  Surgeon: Cory Grande MD;  Location:  GI            Social History:     Social History     Tobacco Use     Smoking status: Some Days     Types: Cigars     Smokeless tobacco: Never     Tobacco comments:     sociallly   Substance Use Topics     Alcohol use: Yes     Alcohol/week: 0.0 standard drinks     Comment: socially            Family History:     Family History   Problem Relation Age of Onset     Mental Illness Mother      Family History Negative Father      Diabetes No family hx of      Coronary Artery Disease No family hx of      Hypertension No family hx of      Hyperlipidemia No family  hx of      Cerebrovascular Disease No family hx of      Breast Cancer No family hx of      Colon Cancer No family hx of      Prostate Cancer No family hx of      Other Cancer No family hx of      Depression No family hx of      Anxiety Disorder No family hx of      Substance Abuse No family hx of      Anesthesia Reaction No family hx of      Asthma No family hx of      Osteoporosis No family hx of      Genetic Disorder No family hx of      Thyroid Disease No family hx of      Obesity No family hx of      Unknown/Adopted No family hx of      Deep Vein Thrombosis (DVT) No family hx of             Allergies:   No Known Allergies         Medications:     Current Outpatient Medications   Medication     acetaminophen (TYLENOL) 325 MG tablet     hydrOXYzine (ATARAX) 25 MG tablet     ibuprofen (ADVIL/MOTRIN) 800 MG tablet     melatonin 5 MG tablet     methocarbamol (ROBAXIN) 500 MG tablet     methocarbamol (ROBAXIN) 750 MG tablet     methylPREDNISolone (MEDROL DOSEPAK) 4 MG tablet therapy pack     oxyCODONE (ROXICODONE) 5 MG tablet     senna-docusate (SENOKOT-S/PERICOLACE) 8.6-50 MG tablet     No current facility-administered medications for this visit.             Review of Systems:   A focused musculoskeletal and neurologic ROS was performed with pertinent positives and negatives noted in the HPI.  Additional systems were also reviewed and are documented at the bottom of the note.         Physical Exam:   Vitals: There were no vitals taken for this visit.  Musculoskeletal, Neurologic, and Spine:          Lumbar Spine:    Appearance - No gross stepoffs or deformities    Motor -     L2-3: Hip flexion 5/5 R and 5/5 L strength          L3/4:  Knee extension R 5/5 and L 5/5 strength         L4/5:  Foot dorsiflexion R 5/5 L 5/5 and       EHL dorsiflexion R 4/5 L 4/5 strength         S1:  Plantarflexion/Peroneal Muscles  R 5/5 and L 5/5 strength    Sensation: intact to light touch L3-S1 distribution  BLE      Neurologic:      REFLEXES Left Right   Biceps 1+ 1+   Triceps 1+ 1+   Brachioradialis 1+ 1+   Patella 1+ 1+   Ankle jerk 1+ 1+   Babinski No upgoing great toe No upgoing great toe   Clonus 0 beats 0 beats     Hip Exam:  No pain with hip log roll and no tenderness over the greater trochanters.    Alignment:  Patient stands with a neutral standing sagittal balance.    Incisions well healed         Imaging:   We ordered and independently reviewed new radiographs at this clinic visit. The results were discussed with the patient. Findings include:     Radiographs show postoperative changes       Assessment and Plan:     42 year old male with good outcome status post decompression.  May advance activities as tolerated and I wrote him a work letter stating that he may need a gradual return to heavy lifting given that he is a [employer], but I am okay with him advancing things as he feels able.  Will you make a physical therapy referral to help guide him in this process.  I hope he continues to do well and he will let me know if anything changes otherwise follow-up as needed.           Respectfully,  Florin Verde MD  Spine Surgery  [facility]    Answers for HPI/ROS submitted by the patient on 3/27/2023  General Symptoms: No  Skin Symptoms: No  HENT Symptoms: No  EYE SYMPTOMS: No  HEART SYMPTOMS: No  LUNG SYMPTOMS: No  INTESTINAL SYMPTOMS: No  URINARY SYMPTOMS: No  REPRODUCTIVE SYMPTOMS: No  SKELETAL SYMPTOMS: No  BLOOD SYMPTOMS: No  NERVOUS SYSTEM SYMPTOMS: No  MENTAL HEALTH SYMPTOMS: No

## 2023-03-27 NOTE — LETTER
3/27/2023         RE: Yobani Baldwin  [address]  [facility] [address]        Dear Colleague,    Thank you for referring your patient, Yobani Baldwin, to the [facility] [place]. Please see a copy of my visit note below.    Spine Surgery Return Clinic Visit      Chief Complaint:   RECHECK (Patient returns 7w 4d s/p right far lateral lumbar 4-5 discectomy and midline lumbar 5-sacral 1 decompression.)      Interval HPI:  Symptom Profile Including: location of symptoms, onset, severity, exacerbating/alleviating factors, previous treatments:        Yobani Baldwin is a 42 year old male who returns today about 7 weeks status post lumbar decompression.  His leg symptoms are totally resolved.  He is dealing with some back stiffness.  He has a number of important questions regard return to work.            Past Medical History:     Past Medical History:   Diagnosis Date     Back pain      Lumbar radiculopathy             Past Surgical History:     Past Surgical History:   Procedure Laterality Date     COLONOSCOPY N/A 02/09/2022    Procedure: COLONOSCOPY, FLEXIBLE, WITH LESION REMOVAL USING SNARE;  Surgeon: Cory Grande MD;  Location:  GI     DECOMPRESSION LUMBAR ONE LEVEL N/A 2/2/2023    Procedure: Right Far Lateral lumbar 4-5 discetomy and midline Lumbar 5 to sacral 1 decompression;  Surgeon: Florin Verde MD;  Location:  OR     DENTAL SURGERY      wisdom teeth     ESOPHAGOSCOPY, GASTROSCOPY, DUODENOSCOPY (EGD), COMBINED N/A 02/09/2022    Procedure: Esophagoscopy, gastroscopy, duodenoscopy (EGD), combined;  Surgeon: Cory Grande MD;  Location:  GI            Social History:     Social History     Tobacco Use     Smoking status: Some Days     Types: Cigars     Smokeless tobacco: Never     Tobacco comments:     sociallly   Substance Use Topics     Alcohol use: Yes     Alcohol/week: 0.0 standard drinks     Comment: socially             Family History:     Family History   Problem Relation Age of Onset     Mental Illness Mother      Family History Negative Father      Diabetes No family hx of      Coronary Artery Disease No family hx of      Hypertension No family hx of      Hyperlipidemia No family hx of      Cerebrovascular Disease No family hx of      Breast Cancer No family hx of      Colon Cancer No family hx of      Prostate Cancer No family hx of      Other Cancer No family hx of      Depression No family hx of      Anxiety Disorder No family hx of      Substance Abuse No family hx of      Anesthesia Reaction No family hx of      Asthma No family hx of      Osteoporosis No family hx of      Genetic Disorder No family hx of      Thyroid Disease No family hx of      Obesity No family hx of      Unknown/Adopted No family hx of      Deep Vein Thrombosis (DVT) No family hx of             Allergies:   No Known Allergies         Medications:     Current Outpatient Medications   Medication     acetaminophen (TYLENOL) 325 MG tablet     hydrOXYzine (ATARAX) 25 MG tablet     ibuprofen (ADVIL/MOTRIN) 800 MG tablet     melatonin 5 MG tablet     methocarbamol (ROBAXIN) 500 MG tablet     methocarbamol (ROBAXIN) 750 MG tablet     methylPREDNISolone (MEDROL DOSEPAK) 4 MG tablet therapy pack     oxyCODONE (ROXICODONE) 5 MG tablet     senna-docusate (SENOKOT-S/PERICOLACE) 8.6-50 MG tablet     No current facility-administered medications for this visit.             Review of Systems:   A focused musculoskeletal and neurologic ROS was performed with pertinent positives and negatives noted in the HPI.  Additional systems were also reviewed and are documented at the bottom of the note.         Physical Exam:   Vitals: There were no vitals taken for this visit.  Musculoskeletal, Neurologic, and Spine:          Lumbar Spine:    Appearance - No gross stepoffs or deformities    Motor -     L2-3: Hip flexion 5/5 R and 5/5 L strength          L3/4:  Knee  extension R 5/5 and L 5/5 strength         L4/5:  Foot dorsiflexion R 5/5 L 5/5 and       EHL dorsiflexion R 4/5 L 4/5 strength         S1:  Plantarflexion/Peroneal Muscles  R 5/5 and L 5/5 strength    Sensation: intact to light touch L3-S1 distribution BLE      Neurologic:      REFLEXES Left Right   Biceps 1+ 1+   Triceps 1+ 1+   Brachioradialis 1+ 1+   Patella 1+ 1+   Ankle jerk 1+ 1+   Babinski No upgoing great toe No upgoing great toe   Clonus 0 beats 0 beats     Hip Exam:  No pain with hip log roll and no tenderness over the greater trochanters.    Alignment:  Patient stands with a neutral standing sagittal balance.    Incisions well healed         Imaging:   We ordered and independently reviewed new radiographs at this clinic visit. The results were discussed with the patient. Findings include:     Radiographs show postoperative changes       Assessment and Plan:     42 year old male with good outcome status post decompression.  May advance activities as tolerated and I wrote him a work letter stating that he may need a gradual return to heavy lifting given that he is a [employer], but I am okay with him advancing things as he feels able.  Will you make a physical therapy referral to help guide him in this process.  I hope he continues to do well and he will let me know if anything changes otherwise follow-up as needed.           Respectfully,  Florin Verde MD  Spine Surgery  [facility]    Answers for HPI/ROS submitted by the patient on 3/27/2023  General Symptoms: No  Skin Symptoms: No  HENT Symptoms: No  EYE SYMPTOMS: No  HEART SYMPTOMS: No  LUNG SYMPTOMS: No  INTESTINAL SYMPTOMS: No  URINARY SYMPTOMS: No  REPRODUCTIVE SYMPTOMS: No  SKELETAL SYMPTOMS: No  BLOOD SYMPTOMS: No  NERVOUS SYSTEM SYMPTOMS: No  MENTAL HEALTH SYMPTOMS: No    Sincerely,        Florin Verde MD

## 2023-03-28 ENCOUNTER — THERAPY VISIT (OUTPATIENT)
Dept: PHYSICAL THERAPY | Facility: CLINIC | Age: 43
End: 2023-03-28
Payer: COMMERCIAL

## 2023-03-28 DIAGNOSIS — G89.29 CHRONIC BILATERAL LOW BACK PAIN WITH RIGHT-SIDED SCIATICA: ICD-10-CM

## 2023-03-28 DIAGNOSIS — M54.41 CHRONIC BILATERAL LOW BACK PAIN WITH RIGHT-SIDED SCIATICA: ICD-10-CM

## 2023-03-28 DIAGNOSIS — Z98.890 STATUS POST LUMBAR SURGERY: Primary | ICD-10-CM

## 2023-03-28 PROCEDURE — 97110 THERAPEUTIC EXERCISES: CPT | Mod: GP | Performed by: PHYSICAL THERAPIST

## 2023-04-16 ENCOUNTER — HEALTH MAINTENANCE LETTER (OUTPATIENT)
Age: 43
End: 2023-04-16

## 2023-04-24 ENCOUNTER — THERAPY VISIT (OUTPATIENT)
Dept: PHYSICAL THERAPY | Facility: CLINIC | Age: 43
End: 2023-04-24
Payer: COMMERCIAL

## 2023-04-24 DIAGNOSIS — G89.29 CHRONIC BILATERAL LOW BACK PAIN WITH RIGHT-SIDED SCIATICA: ICD-10-CM

## 2023-04-24 DIAGNOSIS — Z98.890 STATUS POST LUMBAR SURGERY: Primary | ICD-10-CM

## 2023-04-24 DIAGNOSIS — M54.41 CHRONIC BILATERAL LOW BACK PAIN WITH RIGHT-SIDED SCIATICA: ICD-10-CM

## 2023-04-24 PROCEDURE — 97110 THERAPEUTIC EXERCISES: CPT | Mod: GP

## 2023-04-24 NOTE — PROGRESS NOTES
DISCHARGE REPORT    Progress reporting period is from 3/21/23 to 4/24/23.       SUBJECTIVE  Subjective changes noted by patient: Still having some pain around procedure site. Yesterday was moving in the bed and had a shooting pain. Has good days and bad days. 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 Has not yet returned to work. Every now and then will have a sharper pain with certain movement, like bending/lifting. Exercises are going well. Still feels challenged by them. Has access to some dumbbells at home 5, 10 and 25s. No sxs in his leg.    Current pain level is 0/10  .     Initial Pain level: 6/10.   Changes in function:  Yes (See Goal flowsheet attached for changes in current functional level)  Adverse reaction to treatment or activity: None    OBJECTIVE  Changes noted in objective findings:  Yes, see goals/below.  Objective: Lumbar ROM: flexion hands mid shin, min loss, sidebend and rotation WNL. Thoracic ROM WNL, cervical flexion/extension WNL, SB limited and painful, rotation WNL. Good tolerance to progression of HEP/strengthening. Time spent ensuring patient understanding of how to appropriately progress and get back into work. Pt feels confident in IND management.     ASSESSMENT/PLAN  Updated problem list and treatment plan: Diagnosis 1:  S/p discectomy/decompression  Pain -  home program  Decreased ROM/flexibility - home program  Decreased strength - home program  Decreased function - home program  STG/LTGs have been met or progress has been made towards goals:  Yes (See Goal flow sheet completed today.)  Assessment of Progress: The patient's condition is improving.  Self Management Plans:  Patient has been instructed in a home treatment program.  I have re-evaluated this patient and find that the nature, scope, duration and intensity of the therapy is appropriate for the medical condition of the patient.  Yobani continues to require the following intervention to  meet STG and LTG's:  PT intervention is no longer required to meet STG/LTG.    Recommendations:  This patient is ready to be discharged from therapy and continue their home treatment program.    Please refer to the daily flowsheet for treatment today, total treatment time and time spent performing 1:1 timed codes.

## 2023-07-05 ENCOUNTER — OFFICE VISIT (OUTPATIENT)
Dept: FAMILY MEDICINE | Facility: CLINIC | Age: 43
End: 2023-07-05
Payer: COMMERCIAL

## 2023-07-05 VITALS
WEIGHT: 180.5 LBS | HEART RATE: 67 BPM | OXYGEN SATURATION: 98 % | DIASTOLIC BLOOD PRESSURE: 74 MMHG | BODY MASS INDEX: 25.27 KG/M2 | RESPIRATION RATE: 18 BRPM | SYSTOLIC BLOOD PRESSURE: 131 MMHG | HEIGHT: 71 IN | TEMPERATURE: 97.2 F

## 2023-07-05 DIAGNOSIS — K62.5 BRBPR (BRIGHT RED BLOOD PER RECTUM): Primary | ICD-10-CM

## 2023-07-05 PROCEDURE — 99213 OFFICE O/P EST LOW 20 MIN: CPT | Performed by: FAMILY MEDICINE

## 2023-07-05 ASSESSMENT — PATIENT HEALTH QUESTIONNAIRE - PHQ9
SUM OF ALL RESPONSES TO PHQ QUESTIONS 1-9: 0
10. IF YOU CHECKED OFF ANY PROBLEMS, HOW DIFFICULT HAVE THESE PROBLEMS MADE IT FOR YOU TO DO YOUR WORK, TAKE CARE OF THINGS AT HOME, OR GET ALONG WITH OTHER PEOPLE: NOT DIFFICULT AT ALL
SUM OF ALL RESPONSES TO PHQ QUESTIONS 1-9: 0

## 2023-07-05 ASSESSMENT — PAIN SCALES - GENERAL: PAINLEVEL: NO PAIN (0)

## 2023-07-05 NOTE — PROGRESS NOTES
Assessment & Plan       ICD-10-CM    1. BRBPR (bright red blood per rectum)  K62.5          BRBPR x 3 over a week in setting of loose BM's (no known prior hard stools/constipation/straining), resolved after that week, and no sx's for past two weeks.    No other GI sx's then or otherwise.   Did have a h/o melena in early '22, but had EGD and colonoscopy which were reassuring, and those sx's resolved after the scopes. Hgb has been fine.  --Discussed with fairly recent normal colonoscopy (2/22) and no pain with BM, no other GI sx's, suspect potential hemorrhoid or rectal irritation from loose stools during episode.    --Given no sx's x 2 wks, will hold off on anoscopy and labs.    --Rec fiber supplements if abnl stools (loose or hard/straining).    Follow-up if sx's return.  Rec physical in the next 3-6 months as well.      I spent a total of 23 minutes on the day of the visit.   Time spent by me doing chart review, history and exam, documentation and further activities per the note    Catherine Mercado MD  [facility] Clinic[facility]ABY Hilton is a 43 year old, presenting for the following health issues:  Rectal Problem         No data to display              History of Present Illness       Reason for visit:  Blood in my stool    He eats 2-3 servings of fruits and vegetables daily.He consumes 2 sweetened beverage(s) daily.He exercises with enough effort to increase his heart rate 60 or more minutes per day.  He exercises with enough effort to increase his heart rate 4 days per week.   He is taking medications regularly.    Today's PHQ-9         PHQ-9 Total Score: 0    PHQ-9 Q9 Thoughts of better off dead/self-harm past 2 weeks :   Not at all    How difficult have these problems made it for you to do your work, take care of things at home, or get along with other people: Not difficult at all       Hemorrhoids  Onset/Duration: 3 weeks   Description:   Gita-anal lump: No  Pain:  "YES  Itching: No  Accompanying Signs & Symptoms:  Blood in stool: YES  Changes in stool pattern: YES  History:   Any previous GI studies done:Colonoscopy  Family History of colon cancer: No  Precipitating factors:   None  Alleviating factors:  None  Therapies tried and outcome: none    After a BM, saw bright red blood in the toilet bowl.  Next BM, still same bright blood.  ~3wks ago.  Happened three times over a week.  Stools at that time were soft and loose- looser than normal.  No stomach pain, didn't have any rectal pain.  No irritation/sensitivity, didn't feel any bumps.    No blood the last two weeks.  Stools back to his usual stools, normal, firm- usually ~2x/day, curvy or lumpy, formed.      Did have endoscopy and colonoscopy in 2/22 for dark stools/melana.   Stools alternated between loose and firm, and dark stools at that time.  Rarely has had a dark stool since the colonoscopy.  No recent dark stools.      Review of Systems   Constitutional, HEENT, cardiovascular, pulmonary, gi and gu systems are negative, except as otherwise noted.        Objective    /74   Pulse 67   Temp 97.2  F (36.2  C) (Temporal)   Resp 18   Ht 1.797 m (5' 10.75\")   Wt 81.9 kg (180 lb 8 oz)   SpO2 98%   BMI 25.35 kg/m    Body mass index is 25.35 kg/m .  Physical Exam   GENERAL: healthy, alert and no distress  NECK: no adenopathy, no asymmetry, masses, or scars and thyroid normal to palpation  RESP: lungs clear to auscultation - no rales, rhonchi or wheezes  CV: regular rate and rhythm, normal S1 S2, no S3 or S4, no murmur, click or rub, no peripheral edema and peripheral pulses strong  ABDOMEN: soft, nontender, no hepatosplenomegaly, no masses and bowel sounds normal  MS: no gross musculoskeletal defects noted, no edema    Admission on 02/02/2023, Discharged on 02/04/2023   Component Date Value Ref Range Status     WBC Count 02/02/2023 16.5 (H)  4.0 - 11.0 10e3/uL Final     RBC Count 02/02/2023 5.38  4.40 - 5.90 10e6/uL " Final     Hemoglobin 02/02/2023 15.5  13.3 - 17.7 g/dL Final     Hematocrit 02/02/2023 48.5  40.0 - 53.0 % Final     MCV 02/02/2023 90  78 - 100 fL Final     MCH 02/02/2023 28.8  26.5 - 33.0 pg Final     MCHC 02/02/2023 32.0  31.5 - 36.5 g/dL Final     RDW 02/02/2023 13.3  10.0 - 15.0 % Final     Platelet Count 02/02/2023 265  150 - 450 10e3/uL Final     Sodium 02/02/2023 138  133 - 144 mmol/L Final     Potassium 02/02/2023 3.8  3.4 - 5.3 mmol/L Final     Chloride 02/02/2023 111 (H)  94 - 109 mmol/L Final     Carbon Dioxide (CO2) 02/02/2023 22  20 - 32 mmol/L Final     Anion Gap 02/02/2023 5  3 - 14 mmol/L Final     Urea Nitrogen 02/02/2023 13  7 - 30 mg/dL Final     Creatinine 02/02/2023 0.94  0.66 - 1.25 mg/dL Final     Calcium 02/02/2023 9.0  8.5 - 10.1 mg/dL Final     Glucose 02/02/2023 82  70 - 99 mg/dL Final     GFR Estimate 02/02/2023 >90  >60 mL/min/1.73m2 Final    eGFR calculated using 2021 CKD-EPI equation.     Ventricular Rate 02/02/2023 67  BPM Final     Atrial Rate 02/02/2023 67  BPM Final     IL Interval 02/02/2023 136  ms Final     QRS Duration 02/02/2023 80  ms Final     QT 02/02/2023 410  ms Final     QTc 02/02/2023 433  ms Final     P Axis 02/02/2023 62  degrees Final     R AXIS 02/02/2023 85  degrees Final     T [place] 02/02/2023 65  degrees Final     Interpretation ECG 02/02/2023    Final                    Value:Sinus rhythm with sinus arrhythmia  Possible Left atrial enlargement  RSR' v1 noted  Nonspecific T wave abnormality  Abnormal ECG  No previous ECGs available  Confirmed by MD DOMINIC, KWADWO ([id number]) on 2/3/2023 11:52:08 AM       Sodium 02/03/2023 138  133 - 144 mmol/L Final     Potassium 02/03/2023 4.2  3.4 - 5.3 mmol/L Final     Chloride 02/03/2023 107  94 - 109 mmol/L Final     Carbon Dioxide (CO2) 02/03/2023 27  20 - 32 mmol/L Final     Anion Gap 02/03/2023 4  3 - 14 mmol/L Final     Urea Nitrogen 02/03/2023 9  7 - 30 mg/dL Final     Creatinine 02/03/2023 1.03  0.66 - 1.25 mg/dL Final      Calcium 02/03/2023 8.5  8.5 - 10.1 mg/dL Final     Glucose 02/03/2023 108 (H)  70 - 99 mg/dL Final     Alkaline Phosphatase 02/03/2023 67  40 - 150 U/L Final     AST 02/03/2023 40  0 - 45 U/L Final     ALT 02/03/2023 26  0 - 70 U/L Final     Protein Total 02/03/2023 6.7 (L)  6.8 - 8.8 g/dL Final     Albumin 02/03/2023 3.3 (L)  3.4 - 5.0 g/dL Final     Bilirubin Total 02/03/2023 0.7  0.2 - 1.3 mg/dL Final     GFR Estimate 02/03/2023 >90  >60 mL/min/1.73m2 Final    eGFR calculated using 2021 CKD-EPI equation.     WBC Count 02/03/2023 19.8 (H)  4.0 - 11.0 10e3/uL Final     RBC Count 02/03/2023 4.93  4.40 - 5.90 10e6/uL Final     Hemoglobin 02/03/2023 14.2  13.3 - 17.7 g/dL Final     Hematocrit 02/03/2023 44.1  40.0 - 53.0 % Final     MCV 02/03/2023 90  78 - 100 fL Final     MCH 02/03/2023 28.8  26.5 - 33.0 pg Final     MCHC 02/03/2023 32.2  31.5 - 36.5 g/dL Final     RDW 02/03/2023 13.2  10.0 - 15.0 % Final     Platelet Count 02/03/2023 227  150 - 450 10e3/uL Final     % Neutrophils 02/03/2023 81  % Final     % Lymphocytes 02/03/2023 9  % Final     % Monocytes 02/03/2023 9  % Final     % Eosinophils 02/03/2023 0  % Final     % Basophils 02/03/2023 0  % Final     % Immature Granulocytes 02/03/2023 1  % Final     NRBCs per 100 WBC 02/03/2023 0  <1 /100 Final     Absolute Neutrophils 02/03/2023 16.2 (H)  1.6 - 8.3 10e3/uL Final     Absolute Lymphocytes 02/03/2023 1.7  0.8 - 5.3 10e3/uL Final     Absolute Monocytes 02/03/2023 1.7 (H)  0.0 - 1.3 10e3/uL Final     Absolute Eosinophils 02/03/2023 0.0  0.0 - 0.7 10e3/uL Final     Absolute Basophils 02/03/2023 0.0  0.0 - 0.2 10e3/uL Final     Absolute Immature Granulocytes 02/03/2023 0.1  <=0.4 10e3/uL Final     Absolute NRBCs 02/03/2023 0.0  10e3/uL Final     Sodium 02/04/2023 137  133 - 144 mmol/L Final     Potassium 02/04/2023 3.6  3.4 - 5.3 mmol/L Final     Chloride 02/04/2023 104  94 - 109 mmol/L Final     Carbon Dioxide (CO2) 02/04/2023 28  20 - 32 mmol/L Final      Anion Gap 02/04/2023 5  3 - 14 mmol/L Final     Urea Nitrogen 02/04/2023 10  7 - 30 mg/dL Final     Creatinine 02/04/2023 0.95  0.66 - 1.25 mg/dL Final     Calcium 02/04/2023 8.7  8.5 - 10.1 mg/dL Final     Glucose 02/04/2023 86  70 - 99 mg/dL Final     Alkaline Phosphatase 02/04/2023 61  40 - 150 U/L Final     AST 02/04/2023 27  0 - 45 U/L Final     ALT 02/04/2023 20  0 - 70 U/L Final     Protein Total 02/04/2023 7.0  6.8 - 8.8 g/dL Final     Albumin 02/04/2023 3.0 (L)  3.4 - 5.0 g/dL Final     Bilirubin Total 02/04/2023 0.6  0.2 - 1.3 mg/dL Final     GFR Estimate 02/04/2023 >90  >60 mL/min/1.73m2 Final    eGFR calculated using 2021 CKD-EPI equation.     WBC Count 02/04/2023 19.3 (H)  4.0 - 11.0 10e3/uL Final     RBC Count 02/04/2023 4.88  4.40 - 5.90 10e6/uL Final     Hemoglobin 02/04/2023 14.0  13.3 - 17.7 g/dL Final     Hematocrit 02/04/2023 43.5  40.0 - 53.0 % Final     MCV 02/04/2023 89  78 - 100 fL Final     MCH 02/04/2023 28.7  26.5 - 33.0 pg Final     MCHC 02/04/2023 32.2  31.5 - 36.5 g/dL Final     RDW 02/04/2023 13.3  10.0 - 15.0 % Final     Platelet Count 02/04/2023 211  150 - 450 10e3/uL Final     % Neutrophils 02/04/2023 81  % Final     % Lymphocytes 02/04/2023 8  % Final     % Monocytes 02/04/2023 10  % Final     % Eosinophils 02/04/2023 0  % Final     % Basophils 02/04/2023 0  % Final     % Immature Granulocytes 02/04/2023 1  % Final     NRBCs per 100 WBC 02/04/2023 0  <1 /100 Final     Absolute Neutrophils 02/04/2023 15.6 (H)  1.6 - 8.3 10e3/uL Final     Absolute Lymphocytes 02/04/2023 1.6  0.8 - 5.3 10e3/uL Final     Absolute Monocytes 02/04/2023 2.0 (H)  0.0 - 1.3 10e3/uL Final     Absolute Eosinophils 02/04/2023 0.0  0.0 - 0.7 10e3/uL Final     Absolute Basophils 02/04/2023 0.1  0.0 - 0.2 10e3/uL Final     Absolute Immature Granulocytes 02/04/2023 0.1  <=0.4 10e3/uL Final     Absolute NRBCs 02/04/2023 0.0  10e3/uL Final

## 2023-08-15 ENCOUNTER — TELEPHONE (OUTPATIENT)
Dept: FAMILY MEDICINE | Facility: CLINIC | Age: 43
End: 2023-08-15

## 2023-08-15 ENCOUNTER — OFFICE VISIT (OUTPATIENT)
Dept: FAMILY MEDICINE | Facility: CLINIC | Age: 43
End: 2023-08-15
Payer: COMMERCIAL

## 2023-08-15 ENCOUNTER — TELEPHONE (OUTPATIENT)
Dept: ORTHOPEDICS | Facility: CLINIC | Age: 43
End: 2023-08-15

## 2023-08-15 VITALS
DIASTOLIC BLOOD PRESSURE: 66 MMHG | WEIGHT: 182.7 LBS | BODY MASS INDEX: 26.16 KG/M2 | SYSTOLIC BLOOD PRESSURE: 137 MMHG | OXYGEN SATURATION: 99 % | HEIGHT: 70 IN | RESPIRATION RATE: 16 BRPM | TEMPERATURE: 98 F | HEART RATE: 79 BPM

## 2023-08-15 DIAGNOSIS — Z98.890 S/P LUMBAR DISCECTOMY: ICD-10-CM

## 2023-08-15 DIAGNOSIS — M54.16 LUMBAR RADICULOPATHY: Primary | ICD-10-CM

## 2023-08-15 DIAGNOSIS — R19.5 HARD STOOL: ICD-10-CM

## 2023-08-15 PROCEDURE — 99213 OFFICE O/P EST LOW 20 MIN: CPT | Performed by: FAMILY MEDICINE

## 2023-08-15 RX ORDER — METHOCARBAMOL 750 MG/1
750 TABLET, FILM COATED ORAL EVERY 6 HOURS PRN
Qty: 20 TABLET | Refills: 0 | Status: SHIPPED | OUTPATIENT
Start: 2023-08-15 | End: 2024-01-26

## 2023-08-15 RX ORDER — IBUPROFEN 600 MG/1
600 TABLET, FILM COATED ORAL EVERY 8 HOURS PRN
Qty: 90 TABLET | Refills: 1 | Status: SHIPPED | OUTPATIENT
Start: 2023-08-15

## 2023-08-15 RX ORDER — GABAPENTIN 300 MG/1
300 CAPSULE ORAL AT BEDTIME
Qty: 30 CAPSULE | Refills: 0 | Status: SHIPPED | OUTPATIENT
Start: 2023-08-15 | End: 2024-01-26

## 2023-08-15 RX ORDER — METHYLPREDNISOLONE 4 MG
TABLET, DOSE PACK ORAL
Qty: 21 TABLET | Refills: 0 | Status: SHIPPED | OUTPATIENT
Start: 2023-08-15 | End: 2024-01-26

## 2023-08-15 RX ORDER — PREDNISONE 20 MG/1
60 TABLET ORAL DAILY
Qty: 15 TABLET | Refills: 0 | Status: CANCELLED | OUTPATIENT
Start: 2023-08-15 | End: 2023-08-20

## 2023-08-15 ASSESSMENT — PAIN SCALES - GENERAL: PAINLEVEL: EXTREME PAIN (8)

## 2023-08-15 NOTE — TELEPHONE ENCOUNTER
Reason for Call:  Appointment Request    Patient requesting this type of appt:  left side hip and leg pain x over a week     Requested provider:  any    Reason patient unable to be scheduled: Not within requested timeframe    When does patient want to be seen/preferred time: Same day    Comments: patient wondering if he could be worked in today    Could we send this information to you in [place] or would you prefer to receive a phone call?:   Patient would prefer a phone call   Okay to leave a detailed message?: Yes at Home number on file [phone] (home)    Call taken on 8/15/2023 at 6:37 AM by Rosalia Ramirez

## 2023-08-15 NOTE — TELEPHONE ENCOUNTER
Patient calling back.  Spoke with RN regarding discomfort he is currently experiencing.  States it is back pain, but also travels down his left leg.  Just hurts really bad.  Similar to the type of pain he had prior to surgery earlier this year.  Patient is wondering if he strained the area or something?  Doesn't feel like he has had an injury to the left side of his leg (previous pain occurred on right side of leg)  Patient requested to see PCP today.  PCP did have opening, so RN scheduled.  Ria AMADOR RN

## 2023-08-15 NOTE — TELEPHONE ENCOUNTER
Left sided low back and hip down to calf. I scheduled pt next week for a follow up. He did want a refill of his 800mg ibuprofen. I did tell him to talk to his PCP today for that     Malinda

## 2023-08-15 NOTE — TELEPHONE ENCOUNTER
[facility] Call Center    Phone Message    May a detailed message be left on voicemail: no     Reason for Call: Requesting c/b- works as a [employer]-has been having excruciating pain on left side.    Action Taken: Message routed to:  [facility]    Travel Screening: Not Applicable

## 2023-08-15 NOTE — TELEPHONE ENCOUNTER
Spoke w/ patient.  Pain onset was Friday.   Patient had surgery in February 2023 - s/p lumbar surgery.   Patient has reached out to his surgeon's office and will wait to hear back from them.   He will keep us updated.    Helena GRECO

## 2023-08-15 NOTE — TELEPHONE ENCOUNTER
Prior to surgery, patient had R sided pain and now c/o new L sided shooting pain from back into buttock, lateral thigh, lateral calf. No numbness/tingling, left side feels a little weaker and he has to lean to the right. After surgery, he was doing well and was doing PT and able to go to the gym. However, now he is back at work as [employer] and working 10 hrs/day, wearing a belt and harness although he is trying to be conscientious with a lifting helper and avoiding bending/twisting. It feels similar to pain on his other side. Recommend medrol dose pack and start gabapentin 300 mg hs. Advised avoiding ibuprofen and medrol at same time d/t risk of PUD. S/E discussed. Discussed that if his radicular pain continues we may want to get MRI but it is important to keep appt with Dr. Ammon thakkar. He expressed understanding.
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
## 2023-08-16 ENCOUNTER — TELEPHONE (OUTPATIENT)
Dept: ORTHOPEDICS | Facility: CLINIC | Age: 43
End: 2023-08-16
Payer: COMMERCIAL

## 2023-08-16 DIAGNOSIS — M54.50 LOW BACK PAIN: Primary | ICD-10-CM

## 2023-08-16 NOTE — TELEPHONE ENCOUNTER
[facility] Call Center    Phone Message    May a detailed message be left on voicemail: yes     Reason for Call: Other: Yobani called he was prescribed methylprednisolone and he heard that it can cause insomnia and he would like a medication prescribed to help him sleep once he takes the medication. Please call patient to discuss     Action Taken: Other: [facility]    Travel Screening: Not Applicable

## 2023-08-16 NOTE — TELEPHONE ENCOUNTER
RN called and left a voicemail for patient regarding his medication question.  RN stating that we usually do not prescribe sleeping medication especially with the use of steroid.  RN educated on over-the-counter uses of melatonin and Benadryl for sleep aids.  Also provided a clinic callback number should patient have further questions.    Lexi Armijo RN

## 2023-08-22 ENCOUNTER — ANCILLARY PROCEDURE (OUTPATIENT)
Dept: GENERAL RADIOLOGY | Facility: CLINIC | Age: 43
End: 2023-08-22
Attending: ORTHOPAEDIC SURGERY
Payer: COMMERCIAL

## 2023-08-22 ENCOUNTER — OFFICE VISIT (OUTPATIENT)
Dept: ORTHOPEDICS | Facility: CLINIC | Age: 43
End: 2023-08-22
Payer: COMMERCIAL

## 2023-08-22 DIAGNOSIS — M54.50 LOW BACK PAIN: ICD-10-CM

## 2023-08-22 DIAGNOSIS — M54.16 LUMBAR RADICULOPATHY: Primary | ICD-10-CM

## 2023-08-22 PROCEDURE — 72110 X-RAY EXAM L-2 SPINE 4/>VWS: CPT | Performed by: STUDENT IN AN ORGANIZED HEALTH CARE EDUCATION/TRAINING PROGRAM

## 2023-08-22 PROCEDURE — 99024 POSTOP FOLLOW-UP VISIT: CPT | Performed by: ORTHOPAEDIC SURGERY

## 2023-08-22 NOTE — LETTER
8/22/2023         RE: Yobani Baldwin  [address]  [facility] [address]        Dear Colleague,    Thank you for referring your patient, Yobani Baldwin, to the [facility]. Please see a copy of my visit note below.    Spine Surgery Return Clinic Visit      Chief Complaint:   RECHECK (Patient returns to clinic with recurrence of LBP.  [place] is unsure if he injured himself at work.  Patient is 6+ months s/p L4-5 discectomy and L5-S1 decompression.)      Interval HPI:  Symptom Profile Including: location of symptoms, onset, severity, exacerbating/alleviating factors, previous treatments:        Yobani Baldwin is a 43 year old male who presents for routine postoperative follow-up after a L4-5 discectomy and L5-S1 decompression in February 2023.  The patient has done well in his postoperative course.  He did have a small episode of increased pain approximately 1 week ago after working.  He works as a [employer].  This has largely resolved with the short course of oral prednisone.  No additional complaints or concerns reported at this time            Past Medical History:     Past Medical History:   Diagnosis Date    Back pain     Lumbar radiculopathy             Past Surgical History:     Past Surgical History:   Procedure Laterality Date    COLONOSCOPY N/A 02/09/2022    Procedure: COLONOSCOPY, FLEXIBLE, WITH LESION REMOVAL USING SNARE;  Surgeon: Cory Grande MD;  Location: [facility]    DECOMPRESSION LUMBAR ONE LEVEL N/A 2/2/2023    Procedure: Right Far Lateral lumbar 4-5 discetomy and midline Lumbar 5 to sacral 1 decompression;  Surgeon: Florin Verde MD;  Location:  OR    DENTAL SURGERY      wisdom teeth    ESOPHAGOSCOPY, GASTROSCOPY, DUODENOSCOPY (EGD), COMBINED N/A 02/09/2022    Procedure: Esophagoscopy, gastroscopy, duodenoscopy (EGD), combined;  Surgeon: Cory Grande MD;  Location:  [facility]            Social History:     Social History     Tobacco Use    Smoking status: Some Days     Types: Cigars    Smokeless tobacco: Never    Tobacco comments:     sociallly   Substance Use Topics    Alcohol use: Yes     Alcohol/week: 0.0 standard drinks of alcohol     Comment: socially            Family History:     Family History   Problem Relation Age of Onset    Mental Illness Mother     Family History Negative Father     Diabetes No family hx of     Coronary Artery Disease No family hx of     Hypertension No family hx of     Hyperlipidemia No family hx of     Cerebrovascular Disease No family hx of     Breast Cancer No family hx of     Colon Cancer No family hx of     Prostate Cancer No family hx of     Other Cancer No family hx of     Depression No family hx of     Anxiety Disorder No family hx of     Substance Abuse No family hx of     Anesthesia Reaction No family hx of     Asthma No family hx of     Osteoporosis No family hx of     Genetic Disorder No family hx of     Thyroid Disease No family hx of     Obesity No family hx of     Unknown/Adopted No family hx of     Deep Vein Thrombosis (DVT) No family hx of             Allergies:   No Known Allergies         Medications:     Current Outpatient Medications   Medication    acetaminophen (TYLENOL) 325 MG tablet    gabapentin (NEURONTIN) 300 MG capsule    ibuprofen (ADVIL/MOTRIN) 600 MG tablet    melatonin 5 MG tablet    methocarbamol (ROBAXIN) 750 MG tablet    methylPREDNISolone (MEDROL DOSEPAK) 4 MG tablet therapy pack    senna-docusate (SENOKOT-S/PERICOLACE) 8.6-50 MG tablet     No current facility-administered medications for this visit.             Review of Systems:   A focused musculoskeletal and neurologic ROS was performed with pertinent positives and negatives noted in the HPI.  Additional systems were also reviewed and are documented at the bottom of the note.         Physical Exam:   Vitals: There were no vitals taken for this  visit.  Musculoskeletal, Neurologic, and Spine:          Lumbar Spine:    Appearance - No gross stepoffs or deformities    Motor -     L2-3: Hip flexion 5/5 R and 5/5 L strength          L3/4:  Knee extension R 5/5 and L 5/5 strength         L4/5:  Foot dorsiflexion R 5/5 L 5/5 and       EHL dorsiflexion R 4/5 L 4/5 strength         S1:  Plantarflexion/Peroneal Muscles  R 5/5 and L 5/5 strength    Sensation: intact to light touch L3-S1 distribution BLE      Neurologic:      REFLEXES Left Right   Biceps 1+ 1+   Triceps 1+ 1+   Brachioradialis 1+ 1+   Patella 1+ 1+   Ankle jerk 1+ 1+   Babinski No upgoing great toe No upgoing great toe   Clonus 0 beats 0 beats     Hip Exam:  No pain with hip log roll and no tenderness over the greater trochanters.    Alignment:  Patient stands with a neutral standing sagittal balance.          Imaging:   We ordered and independently reviewed new radiographs at this clinic visit. The results were discussed with the patient. Findings include: x-ray lumbar spine: Postsurgical changes without any radiographic evidence of instability       Assessment and Plan:     43 year old male Status post L4-5 discectomy and L5-S1 decompression who presents for routine postoperative follow-up.  He is doing well.  There is been no change in his clinical condition.  Radiographs are stable without any evidence of complication.  We discussed progressing activities as tolerated.  The patient will follow up in as-needed basis.    Ulisses Bynum MD    The patient was seen and evaluated under the supervision of my attending, Florin Verde MD.     Attending MD (Dr. Florin Verde) :  I met with the patient, reviewed and verified the history and physical exam of the patient and discussed the patient's management with the other clinical providers involved in this patient's care including any involved residents or physicians assistants. I reviewed the above note and agree with the documented findings  and plan of care, which were communicated to the patient.      Florin Verde MD

## 2023-08-22 NOTE — NURSING NOTE
Reason For Visit:   Chief Complaint   Patient presents with    RECHECK     Patient returns to clinic with recurrence of LBP.  [place] is unsure if he injured himself at work.  Patient is 6+ months s/p L4-5 discectomy and L5-S1 decompression.       Primary MD: Catherine Mercado  Ref. MD: milagro    ?  No  Occupation - [employer]  Currently working? Yes.  Work status?  Full time.  Date of injury: 1 week ago  Type of injury: after work soreness.  Date of surgery: 2/2/23  Type of surgery: Right far lateral L4-5 discectomy and midline L5-S1 decompression.    There were no vitals taken for this visit.    Pain Assessment  Patient Currently in Pain: Yes  0-10 Pain Scale: 3  Primary Pain Location: Back    Oswestry (AMINAH) Questionnaire        3/27/2023     9:10 AM   OSWESTRY DISABILITY INDEX   Count 10   Sum 10   Oswestry Score (%) 20 %            Neck Disability Index (NDI) Questionnaire         No data to display                       Visual Analog Pain Scale  Back Pain Scale 0-10: 3    Promis 10 Assessment        4/20/2022     9:33 AM   PROMIS 10   In general, would you say your health is: Good   In general, would you say your quality of life is: Fair   In general, how would you rate your physical health? Fair   In general, how would you rate your mental health, including your mood and your ability to think? Good   In general, how would you rate your satisfaction with your social activities and relationships? Fair   In general, please rate how well you carry out your usual social activities and roles Fair   To what extent are you able to carry out your everyday physical activities such as walking, climbing stairs, carrying groceries, or moving a chair? Moderately   In the past 7 days, how often have you been bothered by emotional problems such as feeling anxious, depressed, or irritable? Sometimes   In the past 7 days, how would you rate your fatigue on average?  Mild   In the past 7 days, how would you rate your pain on average, where 0 means no pain, and 10 means worst imaginable pain? 7   In general, would you say your health is: 3   In general, would you say your quality of life is: 2   In general, how would you rate your physical health? 2   In general, how would you rate your mental health, including your mood and your ability to think? 3   In general, how would you rate your satisfaction with your social activities and relationships? 2   In general, please rate how well you carry out your usual social activities and roles. (This includes activities at home, at work and in your community, and responsibilities as a parent, child, spouse, employee, friend, etc.) 2   To what extent are you able to carry out your everyday physical activities such as walking, climbing stairs, carrying groceries, or moving a chair? 3   In the past 7 days, how often have you been bothered by emotional problems such as feeling anxious, depressed, or irritable? 3   In the past 7 days, how would you rate your fatigue on average? 2   In the past 7 days, how would you rate your pain on average, where 0 means no pain, and 10 means worst imaginable pain? 7   Global Mental Health Score 10   Global Physical Health Score 11   PROMIS TOTAL - SUBSCORES 21                Norris Quiros ATC

## 2023-08-22 NOTE — PROGRESS NOTES
Spine Surgery Return Clinic Visit      Chief Complaint:   RECHECK (Patient returns to clinic with recurrence of LBP.  [place] is unsure if he injured himself at work.  Patient is 6+ months s/p L4-5 discectomy and L5-S1 decompression.)      Interval HPI:  Symptom Profile Including: location of symptoms, onset, severity, exacerbating/alleviating factors, previous treatments:        Yobani Baldwin is a 43 year old male who presents for routine postoperative follow-up after a L4-5 discectomy and L5-S1 decompression in February 2023.  The patient has done well in his postoperative course.  He did have a small episode of increased pain approximately 1 week ago after working.  He works as a [employer].  This has largely resolved with the short course of oral prednisone.  No additional complaints or concerns reported at this time            Past Medical History:     Past Medical History:   Diagnosis Date    Back pain     Lumbar radiculopathy             Past Surgical History:     Past Surgical History:   Procedure Laterality Date    COLONOSCOPY N/A 02/09/2022    Procedure: COLONOSCOPY, FLEXIBLE, WITH LESION REMOVAL USING SNARE;  Surgeon: Cory Grande MD;  Location:  GI    DECOMPRESSION LUMBAR ONE LEVEL N/A 2/2/2023    Procedure: Right Far Lateral lumbar 4-5 discetomy and midline Lumbar 5 to sacral 1 decompression;  Surgeon: Florin Verde MD;  Location:  OR    DENTAL SURGERY      wisdom teeth    ESOPHAGOSCOPY, GASTROSCOPY, DUODENOSCOPY (EGD), COMBINED N/A 02/09/2022    Procedure: Esophagoscopy, gastroscopy, duodenoscopy (EGD), combined;  Surgeon: Cory Grande MD;  Location:  GI            Social History:     Social History     Tobacco Use    Smoking status: Some Days     Types: Cigars    Smokeless tobacco: Never    Tobacco comments:     sociallly   Substance Use Topics    Alcohol use: Yes     Alcohol/week: 0.0 standard drinks of alcohol      Comment: socially            Family History:     Family History   Problem Relation Age of Onset    Mental Illness Mother     Family History Negative Father     Diabetes No family hx of     Coronary Artery Disease No family hx of     Hypertension No family hx of     Hyperlipidemia No family hx of     Cerebrovascular Disease No family hx of     Breast Cancer No family hx of     Colon Cancer No family hx of     Prostate Cancer No family hx of     Other Cancer No family hx of     Depression No family hx of     Anxiety Disorder No family hx of     Substance Abuse No family hx of     Anesthesia Reaction No family hx of     Asthma No family hx of     Osteoporosis No family hx of     Genetic Disorder No family hx of     Thyroid Disease No family hx of     Obesity No family hx of     Unknown/Adopted No family hx of     Deep Vein Thrombosis (DVT) No family hx of             Allergies:   No Known Allergies         Medications:     Current Outpatient Medications   Medication    acetaminophen (TYLENOL) 325 MG tablet    gabapentin (NEURONTIN) 300 MG capsule    ibuprofen (ADVIL/MOTRIN) 600 MG tablet    melatonin 5 MG tablet    methocarbamol (ROBAXIN) 750 MG tablet    methylPREDNISolone (MEDROL DOSEPAK) 4 MG tablet therapy pack    senna-docusate (SENOKOT-S/PERICOLACE) 8.6-50 MG tablet     No current facility-administered medications for this visit.             Review of Systems:   A focused musculoskeletal and neurologic ROS was performed with pertinent positives and negatives noted in the HPI.  Additional systems were also reviewed and are documented at the bottom of the note.         Physical Exam:   Vitals: There were no vitals taken for this visit.  Musculoskeletal, Neurologic, and Spine:          Lumbar Spine:    Appearance - No gross stepoffs or deformities    Motor -     L2-3: Hip flexion 5/5 R and 5/5 L strength          L3/4:  Knee extension R 5/5 and L 5/5 strength         L4/5:  Foot dorsiflexion R 5/5 L 5/5 and        EHL dorsiflexion R 4/5 L 4/5 strength         S1:  Plantarflexion/Peroneal Muscles  R 5/5 and L 5/5 strength    Sensation: intact to light touch L3-S1 distribution BLE      Neurologic:      REFLEXES Left Right   Biceps 1+ 1+   Triceps 1+ 1+   Brachioradialis 1+ 1+   Patella 1+ 1+   Ankle jerk 1+ 1+   Babinski No upgoing great toe No upgoing great toe   Clonus 0 beats 0 beats     Hip Exam:  No pain with hip log roll and no tenderness over the greater trochanters.    Alignment:  Patient stands with a neutral standing sagittal balance.          Imaging:   We ordered and independently reviewed new radiographs at this clinic visit. The results were discussed with the patient. Findings include: x-ray lumbar spine: Postsurgical changes without any radiographic evidence of instability       Assessment and Plan:     43 year old male Status post L4-5 discectomy and L5-S1 decompression who presents for routine postoperative follow-up.  He is doing well.  There is been no change in his clinical condition.  Radiographs are stable without any evidence of complication.  We discussed progressing activities as tolerated.  The patient will follow up in as-needed basis.    Ulisses Bynum MD    The patient was seen and evaluated under the supervision of my attending, Florin Verde MD.     Attending MD (Dr. Florin Verde) :  I met with the patient, reviewed and verified the history and physical exam of the patient and discussed the patient's management with the other clinical providers involved in this patient's care including any involved residents or physicians assistants. I reviewed the above note and agree with the documented findings and plan of care, which were communicated to the patient.      Florin Verde MD

## 2023-10-16 ENCOUNTER — TELEPHONE (OUTPATIENT)
Dept: NEUROSURGERY | Facility: CLINIC | Age: 43
End: 2023-10-16
Payer: COMMERCIAL

## 2023-10-16 NOTE — TELEPHONE ENCOUNTER
[facility] Call Center    Phone Message    May a detailed message be left on voicemail: yes     Reason for Call: Other: Pt needs the Timeline of his care with Dr. Verde beginning on 04/26/2022 to present date including surgery, post ops and PT in paper form to be sent to his [facility]. In printable format.  He is a contestant for an apprenticeship program and would like to show evidence as to why he is a little behind.  Ie he needs 7000hours and is at 5800 hours.  He needs a little more time. This would be very appreciated as he is trying to appeal the decision to put him on probation in his program which would set him back a year!     Action Taken: Message routed to:  [facility] ortho    Travel Screening: Not Applicable

## 2023-10-16 NOTE — TELEPHONE ENCOUNTER
ATC returned patient's call and provided medical records number for patient to request specific notes/records.    Norris Quiros, ATC

## 2024-01-26 ENCOUNTER — OFFICE VISIT (OUTPATIENT)
Dept: FAMILY MEDICINE | Facility: CLINIC | Age: 44
End: 2024-01-26
Payer: COMMERCIAL

## 2024-01-26 VITALS
RESPIRATION RATE: 16 BRPM | HEART RATE: 82 BPM | TEMPERATURE: 97.3 F | HEIGHT: 70 IN | OXYGEN SATURATION: 100 % | SYSTOLIC BLOOD PRESSURE: 133 MMHG | WEIGHT: 180.2 LBS | BODY MASS INDEX: 25.8 KG/M2 | DIASTOLIC BLOOD PRESSURE: 80 MMHG

## 2024-01-26 DIAGNOSIS — Z11.4 SCREENING FOR HIV (HUMAN IMMUNODEFICIENCY VIRUS): ICD-10-CM

## 2024-01-26 DIAGNOSIS — Z13.6 CARDIOVASCULAR SCREENING; LDL GOAL LESS THAN 130: ICD-10-CM

## 2024-01-26 DIAGNOSIS — M54.50 CHRONIC BILATERAL LOW BACK PAIN WITHOUT SCIATICA: ICD-10-CM

## 2024-01-26 DIAGNOSIS — Z11.59 NEED FOR HEPATITIS C SCREENING TEST: ICD-10-CM

## 2024-01-26 DIAGNOSIS — G89.29 CHRONIC BILATERAL LOW BACK PAIN WITHOUT SCIATICA: ICD-10-CM

## 2024-01-26 DIAGNOSIS — Z98.890 S/P LUMBAR DISCECTOMY: ICD-10-CM

## 2024-01-26 DIAGNOSIS — Z00.00 ROUTINE GENERAL MEDICAL EXAMINATION AT A HEALTH CARE FACILITY: Primary | ICD-10-CM

## 2024-01-26 LAB
ALBUMIN SERPL BCG-MCNC: 4.3 G/DL (ref 3.5–5.2)
ALP SERPL-CCNC: 74 U/L (ref 40–150)
ALT SERPL W P-5'-P-CCNC: 33 U/L (ref 0–70)
ANION GAP SERPL CALCULATED.3IONS-SCNC: 7 MMOL/L (ref 7–15)
AST SERPL W P-5'-P-CCNC: 39 U/L (ref 0–45)
BILIRUB SERPL-MCNC: 0.2 MG/DL
BUN SERPL-MCNC: 15.2 MG/DL (ref 6–20)
CALCIUM SERPL-MCNC: 8.8 MG/DL (ref 8.6–10)
CHLORIDE SERPL-SCNC: 106 MMOL/L (ref 98–107)
CHOLEST SERPL-MCNC: 151 MG/DL
CREAT SERPL-MCNC: 0.87 MG/DL (ref 0.67–1.17)
DEPRECATED HCO3 PLAS-SCNC: 27 MMOL/L (ref 22–29)
EGFRCR SERPLBLD CKD-EPI 2021: >90 ML/MIN/1.73M2
FASTING STATUS PATIENT QL REPORTED: YES
GLUCOSE SERPL-MCNC: 93 MG/DL (ref 70–99)
HCV AB SERPL QL IA: NONREACTIVE
HDLC SERPL-MCNC: 41 MG/DL
HIV 1+2 AB+HIV1 P24 AG SERPL QL IA: NONREACTIVE
POTASSIUM SERPL-SCNC: 4.2 MMOL/L (ref 3.4–5.3)
PROT SERPL-MCNC: 6.8 G/DL (ref 6.4–8.3)
SODIUM SERPL-SCNC: 140 MMOL/L (ref 135–145)

## 2024-01-26 PROCEDURE — 86803 HEPATITIS C AB TEST: CPT | Performed by: FAMILY MEDICINE

## 2024-01-26 PROCEDURE — 99213 OFFICE O/P EST LOW 20 MIN: CPT | Mod: 25 | Performed by: FAMILY MEDICINE

## 2024-01-26 PROCEDURE — 90471 IMMUNIZATION ADMIN: CPT | Performed by: FAMILY MEDICINE

## 2024-01-26 PROCEDURE — 82465 ASSAY BLD/SERUM CHOLESTEROL: CPT | Performed by: FAMILY MEDICINE

## 2024-01-26 PROCEDURE — 90480 ADMN SARSCOV2 VAC 1/ONLY CMP: CPT | Performed by: FAMILY MEDICINE

## 2024-01-26 PROCEDURE — 87389 HIV-1 AG W/HIV-1&-2 AB AG IA: CPT | Performed by: FAMILY MEDICINE

## 2024-01-26 PROCEDURE — 36415 COLL VENOUS BLD VENIPUNCTURE: CPT | Performed by: FAMILY MEDICINE

## 2024-01-26 PROCEDURE — 91320 SARSCV2 VAC 30MCG TRS-SUC IM: CPT | Performed by: FAMILY MEDICINE

## 2024-01-26 PROCEDURE — 83718 ASSAY OF LIPOPROTEIN: CPT | Performed by: FAMILY MEDICINE

## 2024-01-26 PROCEDURE — 99396 PREV VISIT EST AGE 40-64: CPT | Mod: 25 | Performed by: FAMILY MEDICINE

## 2024-01-26 PROCEDURE — 90686 IIV4 VACC NO PRSV 0.5 ML IM: CPT | Performed by: FAMILY MEDICINE

## 2024-01-26 PROCEDURE — 80053 COMPREHEN METABOLIC PANEL: CPT | Performed by: FAMILY MEDICINE

## 2024-01-26 RX ORDER — METHOCARBAMOL 750 MG/1
750 TABLET, FILM COATED ORAL 2 TIMES DAILY PRN
Qty: 60 TABLET | Refills: 5 | Status: SHIPPED | OUTPATIENT
Start: 2024-01-26

## 2024-01-26 RX ORDER — METHOCARBAMOL 750 MG/1
750 TABLET, FILM COATED ORAL EVERY 6 HOURS PRN
Qty: 20 TABLET | Refills: 0 | Status: SHIPPED | OUTPATIENT
Start: 2024-01-26 | End: 2024-01-26

## 2024-01-26 ASSESSMENT — ENCOUNTER SYMPTOMS
ARTHRALGIAS: 1
FREQUENCY: 0
CONSTIPATION: 0
PARESTHESIAS: 0
SORE THROAT: 0
NERVOUS/ANXIOUS: 0
HEMATOCHEZIA: 0
HEMATURIA: 0
JOINT SWELLING: 0
FEVER: 0
EYE PAIN: 0
DIZZINESS: 0
DIARRHEA: 0
HEADACHES: 0
MYALGIAS: 1
DYSURIA: 0
ABDOMINAL PAIN: 0
WEAKNESS: 0
HEARTBURN: 0
SHORTNESS OF BREATH: 0
PALPITATIONS: 0
NAUSEA: 0
COUGH: 0
CHILLS: 0

## 2024-01-26 ASSESSMENT — ANXIETY QUESTIONNAIRES
6. BECOMING EASILY ANNOYED OR IRRITABLE: NOT AT ALL
8. IF YOU CHECKED OFF ANY PROBLEMS, HOW DIFFICULT HAVE THESE MADE IT FOR YOU TO DO YOUR WORK, TAKE CARE OF THINGS AT HOME, OR GET ALONG WITH OTHER PEOPLE?: SOMEWHAT DIFFICULT
5. BEING SO RESTLESS THAT IT IS HARD TO SIT STILL: NOT AT ALL
GAD7 TOTAL SCORE: 6
GAD7 TOTAL SCORE: 6
7. FEELING AFRAID AS IF SOMETHING AWFUL MIGHT HAPPEN: NOT AT ALL
GAD7 TOTAL SCORE: 6
4. TROUBLE RELAXING: NOT AT ALL
2. NOT BEING ABLE TO STOP OR CONTROL WORRYING: MORE THAN HALF THE DAYS
1. FEELING NERVOUS, ANXIOUS, OR ON EDGE: MORE THAN HALF THE DAYS
3. WORRYING TOO MUCH ABOUT DIFFERENT THINGS: MORE THAN HALF THE DAYS
7. FEELING AFRAID AS IF SOMETHING AWFUL MIGHT HAPPEN: NOT AT ALL
IF YOU CHECKED OFF ANY PROBLEMS ON THIS QUESTIONNAIRE, HOW DIFFICULT HAVE THESE PROBLEMS MADE IT FOR YOU TO DO YOUR WORK, TAKE CARE OF THINGS AT HOME, OR GET ALONG WITH OTHER PEOPLE: SOMEWHAT DIFFICULT

## 2024-01-26 ASSESSMENT — PATIENT HEALTH QUESTIONNAIRE - PHQ9
SUM OF ALL RESPONSES TO PHQ QUESTIONS 1-9: 8
10. IF YOU CHECKED OFF ANY PROBLEMS, HOW DIFFICULT HAVE THESE PROBLEMS MADE IT FOR YOU TO DO YOUR WORK, TAKE CARE OF THINGS AT HOME, OR GET ALONG WITH OTHER PEOPLE: SOMEWHAT DIFFICULT
SUM OF ALL RESPONSES TO PHQ QUESTIONS 1-9: 8

## 2024-01-26 ASSESSMENT — PAIN SCALES - GENERAL: PAINLEVEL: NO PAIN (0)

## 2024-01-26 NOTE — LETTER
January 26, 2024      Yobani Baldwin  [address]  [facility] [address]        To Whom It May Concern:    Yobani Baldwin was seen in our clinic. He may return to work with the following: no restrictions        Sincerely,      Catherine Mercado

## 2024-01-26 NOTE — PROGRESS NOTES
"Preventive Care Visit  [facility]  Catherine Mercado MD, Family Medicine  Jan 26, 2024      SUBJECTIVE:   Yobani is a 43 year old, presenting for the following:  Physical (Pt is not fasting)        1/26/2024     7:16 AM   Additional Questions   Roomed by Johanna GUERRERO   Accompanied by n/a     Healthy Habits:     Getting at least 3 servings of Calcium per day:  Yes    Bi-annual eye exam:  NO    Dental care twice a year:  Yes    Sleep apnea or symptoms of sleep apnea:  None    Diet:  Regular (no restrictions)    Frequency of exercise:  2-3 days/week    Duration of exercise:  Greater than 60 minutes    Taking medications regularly:  Yes    Medication side effects:  None    Additional concerns today:  No    No specific questions in this moment.    LBP w/ left leg radiation- better, % better.  PT helped, getting back into the gym and new tool belt helped.  Goes to gym ~2x/wk, legs/arms/cardio, sauna.    Few emotional problems, doing some therapy.  Lost his mom about five yrs ago, dealing with that.  Getting older, certain things, trying to figure out things-- therapist, talking to .  Hopes to fish this summer.    Methocarbamol 750mg- helps muscle pain after work.  Uses ~2x/wk, one pills, rarely will take two in a day.  Takes either at work or when he gets home.  R hip or low back.      Social History     Tobacco Use    Smoking status: Some Days     Types: Cigars    Smokeless tobacco: Never    Tobacco comments:     socially   Substance Use Topics    Alcohol use: Yes     Alcohol/week: 0.0 standard drinks of alcohol     Comment: socially             1/26/2024     7:27 AM   Alcohol Use   Prescreen: >3 drinks/day or >7 drinks/week? No          No data to display                Last PSA: No results found for: \"PSA\"    Reviewed orders with patient. Reviewed health maintenance and updated orders accordingly - Yes      Lab work is in process  Labs reviewed in EPIC    Reviewed and updated as " "needed this visit by clinical staff   Tobacco  Allergies  Meds  Problems  Med Hx  Surg Hx  Fam Hx          Reviewed and updated as needed this visit by Provider   Tobacco  Allergies  Meds  Problems  Med Hx  Surg Hx  Fam Hx          Past Medical History:   Diagnosis Date    Back pain     Lumbar radiculopathy       Past Surgical History:   Procedure Laterality Date    COLONOSCOPY N/A 02/09/2022    Procedure: COLONOSCOPY, FLEXIBLE, WITH LESION REMOVAL USING SNARE;  Surgeon: Cory Grande MD;  Location:  GI    DECOMPRESSION LUMBAR ONE LEVEL N/A 2/2/2023    Procedure: Right Far Lateral lumbar 4-5 discetomy and midline Lumbar 5 to sacral 1 decompression;  Surgeon: Florin Verde MD;  Location:  OR    DENTAL SURGERY      wisdom teeth    ESOPHAGOSCOPY, GASTROSCOPY, DUODENOSCOPY (EGD), COMBINED N/A 02/09/2022    Procedure: Esophagoscopy, gastroscopy, duodenoscopy (EGD), combined;  Surgeon: Cory Grande MD;  Location:  GI     Review of Systems   Constitutional:  Negative for chills and fever.   HENT:  Positive for congestion. Negative for ear pain, hearing loss and sore throat.    Eyes:  Negative for pain and visual disturbance.   Respiratory:  Negative for cough and shortness of breath.    Cardiovascular:  Negative for chest pain and palpitations.   Gastrointestinal:  Negative for abdominal pain, constipation, diarrhea and nausea.   Genitourinary:  Negative for dysuria, frequency, genital sores, hematuria and urgency.   Musculoskeletal:  Positive for arthralgias and myalgias. Negative for joint swelling.   Skin:  Negative for rash.   Neurological:  Negative for dizziness, weakness and headaches.   Psychiatric/Behavioral:  The patient is not nervous/anxious.        OBJECTIVE:   /80 (BP Location: Left arm, Patient Position: Sitting, Cuff Size: Adult Large)   Pulse 82   Temp 97.3  F (36.3  C) (Temporal)   Resp 16   Ht 1.785 m (5' 10.28\")   Wt 81.7 kg (180 lb 3.2 " "oz)   SpO2 100%   BMI 25.65 kg/m     Estimated body mass index is 25.65 kg/m  as calculated from the following:    Height as of this encounter: 1.785 m (5' 10.28\").    Weight as of this encounter: 81.7 kg (180 lb 3.2 oz).  Physical Exam  GENERAL: alert and no distress  EYES: Eyes grossly normal to inspection, PERRL and conjunctivae and sclerae normal  HENT: ear canals and TM's normal, nose and mouth without ulcers or lesions  NECK: no adenopathy, no asymmetry, masses, or scars  RESP: lungs clear to auscultation - no rales, rhonchi or wheezes  CV: regular rate and rhythm, normal S1 S2, no S3 or S4, no murmur, click or rub, no peripheral edema  ABDOMEN: soft, nontender, no hepatosplenomegaly, no masses and bowel sounds normal  MS: no gross musculoskeletal defects noted, no edema  SKIN: no suspicious lesions or rashes  NEURO: Normal strength and tone, mentation intact and speech normal  PSYCH: mentation appears normal, affect normal/bright  LYMPH: no cervical, supraclavicular, axillary, or inguinal adenopathy    Diagnostic Test Results:  Labs reviewed in Epic    ASSESSMENT/PLAN:   Routine general medical examination at a health care facility  Reviewed chronic issues and medications/supplements.   Discussed healthy habits, eye/dental care, healthcare maintenance issues, including cancer screenings (colonoscopies, PSA), relevant immunizations, and cardiac risk factor screenings such as for cholesterol, HTN, and DM.  See orders for tests and screening needed.    Flu vaccine and COVID immunizations needed today.    - PRIMARY CARE FOLLOW-UP SCHEDULING; Future  - INFLUENZA VACCINE IM > 6 MONTHS VALENT IIV4 (AFLURIA/FLUZONE)  - DEPRESSION ACTION PLAN (DAP)  - Comprehensive metabolic panel (BMP + Alb, Alk Phos, ALT, AST, Total. Bili, TP); Future  - COVID-19 12+ (2023-24) (PFIZER)  - Comprehensive metabolic panel (BMP + Alb, Alk Phos, ALT, AST, Total. Bili, TP)    Chronic bilateral low back pain without sciatica/  S/P lumbar " "discectomy  Still with LBP and R hip pain, though the sciatic sx's from last visit are much better with PT, regular gym exercise.  Using methocarbamol for milder but persistent LBP and R hip pain, usually associated with carpentry work, tends to use ~1 tab twice weekly, rarely two tabs in one day if worse sx's.  No se's.  Refills sent.  - Comprehensive metabolic panel (BMP + Alb, Alk Phos, ALT, AST, Total. Bili, TP); Future  - methocarbamol (ROBAXIN) 750 MG tablet; Take 1 tablet (750 mg) by mouth 2 times daily as needed for muscle spasms  - Comprehensive metabolic panel (BMP + Alb, Alk Phos, ALT, AST, Total. Bili, TP)    Screening for HIV (human immunodeficiency virus)/  Need for hepatitis C screening test  - HIV Antigen Antibody Combo; Future  - HIV Antigen Antibody Combo  - Hepatitis C Screen Reflex to HCV RNA Quant and Genotype; Future  - Hepatitis C Screen Reflex to HCV RNA Quant and Genotype    CARDIOVASCULAR SCREENING; LDL GOAL LESS THAN 130  Not fasting today  - Cholesterol; Future  - HDL cholesterol; Future  - Cholesterol  - HDL cholesterol       Follow-up Visit   Expected date:  Jan 26, 2025 (Approximate)      Follow Up Appointment Details:     Follow-up with whom?: PCP    Follow-Up for what?: Adult Preventive    How?: In Person                       Patient has been advised of split billing requirements and indicates understanding: Yes      Counseling  Reviewed preventive health counseling, as reflected in patient instructions      BMI  Estimated body mass index is 25.65 kg/m  as calculated from the following:    Height as of this encounter: 1.785 m (5' 10.28\").    Weight as of this encounter: 81.7 kg (180 lb 3.2 oz).   Weight management plan: Discussed healthy diet and exercise guidelines      He reports that he has been smoking cigars. He has never used smokeless tobacco.  Nicotine/Tobacco Cessation Plan  Information offered: Patient not interested at this time            Signed Electronically by: " Catherine Mercado MD  Answers submitted by the patient for this visit:  Patient Health Questionnaire (Submitted on 1/26/2024)  If you checked off any problems, how difficult have these problems made it for you to do your work, take care of things at home, or get along with other people?: Somewhat difficult  PHQ9 TOTAL SCORE: 8

## 2024-01-26 NOTE — LETTER
January 26, 2024      Yobani Baldwin  [address]  [facility] [address]        To Whom It May Concern:    Yobani Baldwin was seen in our clinic. He may return to work without restrictions.      Sincerely,        Catherine Mercado MD

## 2024-01-26 NOTE — RESULT ENCOUNTER NOTE
-Your HIV and Hepatitis C screening labs are negative.  -Your comprehensive metabolic panel (CMP, which includes electrolyte levels, blood sugar levels, and kidney and liver function tests) looks great.  -Your total cholesterol looks very good, lower than it's been in the past, and your HDL (the good cholesterol) is higher than it's been in the past- both great changes!    Please let me know if you have any questions.  Best,   Han Mercado MD

## 2024-05-10 NOTE — OR NURSING
AFTER YOUR PROCEDURE    Date of Procedure: 5/10/2024    You had the following procedure(s) performed today:  Colonoscopy    Exam Results:  The Physician removed 4 polyp(s), which will be sent to the lab for testing. Lab results will be called or mailed to you within 7-10 business days. Please follow up as directed in your final lab report .    Diet Instructions:  You may resume your regular diet today. It is recommended to avoid heavy, greasy, and spicy foods today.    Medications:  You may resume your medications as prescribed.    Activity for Today:    DO NOT DRIVE.  Do not operate any other heavy machinery or equipment.  Avoid activities that require coordination (climbing ladders, using a knife/cooking equipment, etc.)  Do not make important financial or legal decisions  Do not return to work.  Do not drink any alcohol.  You may resume your activity tomorrow.    It is normal to have.....    Colonoscopy / Sigmoidoscopy   Mild abdominal distention and/or cramping are normal after these procedures, but should pass within an hour or two with the passage of air.  Mild nausea and/or vomiting       When to call Dr. Jordan at [phone] :    Colonoscopy / Sigmoidoscopy   If you have unusual belly pain that is NOT relieved with passing air  If you have rectal bleeding more than blood streaking on the toilet tissue  If you have moderate nausea and/or vomiting         Go to the Emergency Room if you experience any of the following:  Severe pain  Difficulty breathing or swallowing  Persistent vomiting  Vomiting blood, either brown (coffee ground in consistency) or red  Significant rectal bleeding  Chills or fever over 101 degrees F.     Additional Instructions:  Any further questions after hours please contact [facility] [place] 2[place] 24 hours nurse call center at [phone].  Await pathology results; repeat colonoscopy in 3 years         Want to Say “Thank You” to a Nurse?  The MIGUEL Award® was created in memory of JORDAN  Bladder scanned for 300 mls at [address]   [address] by his family to say thank you to bedside nurses who provide an outstanding level of care.  Submit a nomination using any method below.     OR    [email or web address]

## 2025-02-26 DIAGNOSIS — M54.16 LUMBAR RADICULOPATHY: Primary | ICD-10-CM

## 2025-03-04 ENCOUNTER — OFFICE VISIT (OUTPATIENT)
Dept: ORTHOPEDICS | Facility: CLINIC | Age: 45
End: 2025-03-04
Payer: COMMERCIAL

## 2025-03-04 ENCOUNTER — ANCILLARY PROCEDURE (OUTPATIENT)
Dept: GENERAL RADIOLOGY | Facility: CLINIC | Age: 45
End: 2025-03-04
Attending: ORTHOPAEDIC SURGERY
Payer: COMMERCIAL

## 2025-03-04 ENCOUNTER — TELEPHONE (OUTPATIENT)
Dept: ORTHOPEDICS | Facility: CLINIC | Age: 45
End: 2025-03-04

## 2025-03-04 DIAGNOSIS — M54.16 LUMBAR RADICULOPATHY: Primary | ICD-10-CM

## 2025-03-04 DIAGNOSIS — M54.50 CHRONIC BILATERAL LOW BACK PAIN WITHOUT SCIATICA: ICD-10-CM

## 2025-03-04 DIAGNOSIS — G89.29 CHRONIC BILATERAL LOW BACK PAIN WITHOUT SCIATICA: ICD-10-CM

## 2025-03-04 DIAGNOSIS — Z98.890 S/P LUMBAR DISCECTOMY: ICD-10-CM

## 2025-03-04 DIAGNOSIS — M54.16 LUMBAR RADICULOPATHY: ICD-10-CM

## 2025-03-04 PROCEDURE — 72110 X-RAY EXAM L-2 SPINE 4/>VWS: CPT | Performed by: STUDENT IN AN ORGANIZED HEALTH CARE EDUCATION/TRAINING PROGRAM

## 2025-03-04 RX ORDER — METHOCARBAMOL 750 MG/1
750 TABLET, FILM COATED ORAL 2 TIMES DAILY PRN
Qty: 60 TABLET | Refills: 5 | Status: SHIPPED | OUTPATIENT
Start: 2025-03-04

## 2025-03-04 RX ORDER — GABAPENTIN 300 MG/1
CAPSULE ORAL
Qty: 90 CAPSULE | Refills: 1 | Status: SHIPPED | OUTPATIENT
Start: 2025-03-04 | End: 2025-03-19

## 2025-03-04 NOTE — TELEPHONE ENCOUNTER
Patient confirmed rescheduled appointment:  Date: 3/4/25  Time: 3:00pm to 1:50pm  Visit type: Return Surgical Spine  Provider: Dr. Verde  Location: [facility]

## 2025-03-04 NOTE — NURSING NOTE
Reason For Visit:   Chief Complaint   Patient presents with    RECHECK     Lumbar radiculopathy for months        Primary MD: Catherine Mercado  Ref. MD: Víctor       Date of surgery: 2/2/2023    Type of surgery: Right Far Lateral lumbar 4-5 discetomy and midline Lumbar 5 to sacral 1 decompression .      There were no vitals taken for this visit.    Pain Assessment  Patient Currently in Pain: Yes  Patient's Stated Pain Goal: 6  0-10 Pain Scale: 6  Primary Pain Location: Back    Oswestry (AMINAH) Questionnaire        3/4/2025     1:48 PM   OSWESTRY DISABILITY INDEX   Count 10    Sum 10    Oswestry Score (%) 20 %        Patient-reported            Neck Disability Index (NDI) Questionnaire         No data to display                       Visual Analog Pain Scale  Back Pain Scale 0-10: 6  Right leg pain: 0  Left leg pain: 0  Neck Pain Scale 0-10: 0  Right arm pain: 0  Left arm pain: 0    Promis 10 Assessment        4/20/2022     9:33 AM   PROMIS 10   In general, would you say your health is: Good   In general, would you say your quality of life is: Fair   In general, how would you rate your physical health? Fair   In general, how would you rate your mental health, including your mood and your ability to think? Good   In general, how would you rate your satisfaction with your social activities and relationships? Fair   In general, please rate how well you carry out your usual social activities and roles Fair   To what extent are you able to carry out your everyday physical activities such as walking, climbing stairs, carrying groceries, or moving a chair? Moderately   In the past 7 days, how often have you been bothered by emotional problems such as feeling anxious, depressed, or irritable? Sometimes   In the past 7 days, how would you rate your fatigue on average? Mild   In the past 7 days, how would you rate your pain on average, where 0 means no pain, and 10 means worst imaginable pain? 7   In general, would you say  your health is: 3    In general, would you say your quality of life is: 2    In general, how would you rate your physical health? 2    In general, how would you rate your mental health, including your mood and your ability to think? 3    In general, how would you rate your satisfaction with your social activities and relationships? 2    In general, please rate how well you carry out your usual social activities and roles. (This includes activities at home, at work and in your community, and responsibilities as a parent, child, spouse, employee, friend, etc.) 2    To what extent are you able to carry out your everyday physical activities such as walking, climbing stairs, carrying groceries, or moving a chair? 3    In the past 7 days, how often have you been bothered by emotional problems such as feeling anxious, depressed, or irritable? 3    In the past 7 days, how would you rate your fatigue on average? 2    In the past 7 days, how would you rate your pain on average, where 0 means no pain, and 10 means worst imaginable pain? 7    Global Mental Health Score 10   Global Physical Health Score 11   PROMIS TOTAL - SUBSCORES 21       Proxy-reported                Nathaniel Quinones CMA

## 2025-03-04 NOTE — PROGRESS NOTES
Spine Surgery Return Clinic Visit    Chief Complaint:   RECHECK (Lumbar radiculopathy for months )    Interval HPI:  Symptom Profile Including: location of symptoms, onset, severity, exacerbating/alleviating factors, previous treatments:        Yobani Baldwin is a 44 year old male who follows up to clinic today to discuss low back pain that has been present especially since last summer, increasing in severity over the past several months, with some radicular pain going down the front of his thigh.  He is status post right far lateral L4-5 and midline L5-S1 decompression and discectomy in February 2023.  He did well after this surgery with resolution of his preoperative symptoms, but this new pain is different.  There is less of a radicular component now but he mentions it does fluctuate and sometimes goes into the left anterior thigh but never past the knee.  He does not have any associated numbness.  It does hurt more when he stands for prolonged periods and walks.  Sometimes he walks with a limp which his wife has noticed.  He has been trying to do his stretches at home.  He has been taking ibuprofen daily as well as a muscle relaxant.  No bowel or bladder issues.         Past Medical History:     Past Medical History:   Diagnosis Date    Back pain     Lumbar radiculopathy             Past Surgical History:     Past Surgical History:   Procedure Laterality Date    COLONOSCOPY N/A 02/09/2022    Procedure: COLONOSCOPY, FLEXIBLE, WITH LESION REMOVAL USING SNARE;  Surgeon: Cory Grande MD;  Location:  GI    DECOMPRESSION LUMBAR ONE LEVEL N/A 2/2/2023    Procedure: Right Far Lateral lumbar 4-5 discetomy and midline Lumbar 5 to sacral 1 decompression;  Surgeon: Florin Verde MD;  Location:  OR    DENTAL SURGERY      wisdom teeth    ESOPHAGOSCOPY, GASTROSCOPY, DUODENOSCOPY (EGD), COMBINED N/A 02/09/2022    Procedure: Esophagoscopy, gastroscopy, duodenoscopy (EGD), combined;  Surgeon:  Cory Grande MD;  Location:  GI            Social History:     Social History     Tobacco Use    Smoking status: Some Days     Types: Cigars    Smokeless tobacco: Never    Tobacco comments:     socially   Substance Use Topics    Alcohol use: Yes     Alcohol/week: 0.0 standard drinks of alcohol     Comment: socially            Family History:     Family History   Problem Relation Age of Onset    Mental Illness Mother         Bipolar    Family History Negative Father     Osteoarthritis Paternal Uncle         hip replacement    Diabetes No family hx of     Coronary Artery Disease No family hx of     Hypertension No family hx of     Hyperlipidemia No family hx of     Cerebrovascular Disease No family hx of     Breast Cancer No family hx of     Colon Cancer No family hx of     Prostate Cancer No family hx of     Other Cancer No family hx of     Anxiety Disorder No family hx of     Substance Abuse No family hx of     Anesthesia Reaction No family hx of     Asthma No family hx of     Osteoporosis No family hx of     Genetic Disorder No family hx of     Thyroid Disease No family hx of     Obesity No family hx of     Unknown/Adopted No family hx of     Deep Vein Thrombosis (DVT) No family hx of             Allergies:   No Known Allergies         Medications:     Current Outpatient Medications   Medication Sig Dispense Refill    acetaminophen (TYLENOL) 325 MG tablet Take 2 tablets (650 mg) by mouth every 4 hours as needed for mild pain 50 tablet 0    ibuprofen (ADVIL/MOTRIN) 600 MG tablet Take 1 tablet (600 mg) by mouth every 8 hours as needed for moderate pain 90 tablet 1    melatonin 5 MG tablet Take 5 mg by mouth nightly as needed for sleep      methocarbamol (ROBAXIN) 750 MG tablet Take 1 tablet (750 mg) by mouth 2 times daily as needed for muscle spasms 60 tablet 5    senna-docusate (SENOKOT-S/PERICOLACE) 8.6-50 MG tablet Take 1 tablet by mouth daily 30 tablet 0     No current facility-administered  medications for this visit.             Review of Systems:   A focused musculoskeletal and neurologic ROS was performed with pertinent positives and negatives noted in the HPI.         Physical Exam:   Vitals: There were no vitals taken for this visit.  Musculoskeletal, Neurologic, and Spine:     Lumbar Spine:    Appearance: incision well healed  TTP along area of prior incision  Normal gait without assistive devices.  No antalgia / imbalance.    Motor:   L2-3: Hip flexion R 5/5  And L 5/5 strength          L3/4: Knee extension R 5/5 and L 5/5 strength         L4/5: Ankle dorsiflexion R 5/5 L 5/5 and       EHL R 5/5 L 5/5 strength         S1:  Ankle plantarflexion/Peroneal Muscles  R 5/5 and L 5/5 strength    Sensation: intact to light touch L3-S1 distribution BLE        Straight leg raise negative    Neurologic:      REFLEXES Right Left   Patella 2+ 2+   Ankle jerk 2+ 2+   Babinski No upgoing great toe No upgoing great toe   Clonus 0 beats 0 beats          Imaging:   We ordered and independently reviewed new radiographs at this clinic visit. The results were discussed with the patient. Findings include:     4 view lumbar spine x-rays demonstrate maintained disc heights with no significant degenerative changes or listhesis noted.     Assessment and Plan:   Assessment:  44 year old male with left low back and occasional radicular anterior left thigh pain over several months, status post prior lumbar decompression largely for right sided radiculopathy in 2023     Plan:  We discussed options for Yobani's new symptoms including physical therapy, gabapentin, epidural corticosteroid injections, and updating an MRI.  He was amenable to starting gabapentin and will titrate this on a weekly basis starting first at night and then twice daily before going to 3 times daily.  He will monitor for excessive dizziness.  He was also interested in updating the lumbar MRI which is certainly reasonable to evaluate for any  compressive lesions for which we could then consider a targeted injection.  He will continue his home exercises and deferred to formal physical therapy referral at this time.  Schedule a phone visit follow-up after the completion of the above.    --  Zack Jonas MD  Orthopedic Surgery PGY4     Attending MD (Dr. Florin Verde) : I personally performed greater than 50% of the effort related to this patient visit and am responsible for the medical decision making and billing in this case.  I met with the patient, reviewed and verified the history and physical exam of the patient and discussed the patient's management with the other clinical providers involved in this patient's care including any involved residents or physicians assistants. I also personally reviewed the imaging and I personally formulated the treatment plan and diagnosis in their entirety.  I reviewed the above note and agree with the documented findings and plan of care, which were communicated to the patient.      Florin Verde MD

## 2025-04-01 ENCOUNTER — ANCILLARY PROCEDURE (OUTPATIENT)
Dept: MRI IMAGING | Facility: CLINIC | Age: 45
End: 2025-04-01
Attending: ORTHOPAEDIC SURGERY
Payer: COMMERCIAL

## 2025-04-01 DIAGNOSIS — M54.16 LUMBAR RADICULOPATHY: ICD-10-CM

## 2025-04-01 DIAGNOSIS — Z98.890 S/P LUMBAR DISCECTOMY: ICD-10-CM

## 2025-04-01 PROCEDURE — 72148 MRI LUMBAR SPINE W/O DYE: CPT | Mod: GC | Performed by: STUDENT IN AN ORGANIZED HEALTH CARE EDUCATION/TRAINING PROGRAM

## 2025-04-08 ENCOUNTER — VIRTUAL VISIT (OUTPATIENT)
Dept: ORTHOPEDICS | Facility: CLINIC | Age: 45
End: 2025-04-08
Attending: ORTHOPAEDIC SURGERY
Payer: COMMERCIAL

## 2025-04-08 DIAGNOSIS — M54.16 LUMBAR RADICULOPATHY: Primary | ICD-10-CM

## 2025-04-08 DIAGNOSIS — M54.50 CHRONIC BILATERAL LOW BACK PAIN WITHOUT SCIATICA: ICD-10-CM

## 2025-04-08 DIAGNOSIS — G89.29 CHRONIC BILATERAL LOW BACK PAIN WITHOUT SCIATICA: ICD-10-CM

## 2025-04-08 NOTE — LETTER
4/8/2025      Yobani Baldwin  [address]  [facility] [address]      Dear Colleague,    Thank you for referring your patient, Yobani Baldwin, to the [facility] [place]. Please see a copy of my visit note below.    Virtual Visit Details    Type of service:  Telephone Visit   Phone call duration: 10 minutes   Originating Location (pt. Location): Home    Distant Location (provider location):  On-site  Telephone visit completed due to provider preference     Yobani is having pain down the leg and into both buttocks and thighs.  It starts on the right and travels to the left.      He had a prior L4-5 lateral discectomy.  Repeat MRI here shows either recurrent or residual disc material at that level and I suspect that this is the cause of his symptoms.   \discussed that fixing this may require a fusion given that he has already had an attempted discectomy in this area.  I would like to meet with him to discuss this and we will set up an appointment.      No bill for this visit             Again, thank you for allowing me to participate in the care of your patient.        Sincerely,        Florin Verde MD    Electronically signed

## 2025-04-08 NOTE — PROGRESS NOTES
Virtual Visit Details    Type of service:  Telephone Visit   Phone call duration: 10 minutes   Originating Location (pt. Location): Home    Distant Location (provider location):  On-site  Telephone visit completed due to provider preference     Yobani is having pain down the leg and into both buttocks and thighs.  It starts on the right and travels to the left.      He had a prior L4-5 lateral discectomy.  Repeat MRI here shows either recurrent or residual disc material at that level and I suspect that this is the cause of his symptoms.   \  We discussed that fixing this may require a fusion given that he has already had an attempted discectomy in this area.  I would like to meet with him to discuss this and we will set up an appointment.      No bill for this visit

## 2025-04-10 ENCOUNTER — TELEPHONE (OUTPATIENT)
Dept: ORTHOPEDICS | Facility: CLINIC | Age: 45
End: 2025-04-10
Payer: COMMERCIAL

## 2025-04-10 NOTE — TELEPHONE ENCOUNTER
Left Voicemail (1st Attempt) and Sent Mychart (1st Attempt) for the patient to call back and schedule the following:    Appointment type: Return Surgical Spine  Provider:   Return date: 1-2 wks after injection is completed  Specialty phone number: [phone]  Additional appointment(s) needed: Nerve block injection

## 2025-04-22 DIAGNOSIS — M54.16 LUMBAR RADICULOPATHY: Primary | ICD-10-CM

## 2025-04-22 RX ORDER — DIAZEPAM 5 MG/1
5-10 TABLET ORAL
OUTPATIENT
Start: 2025-04-22

## 2025-04-29 ENCOUNTER — OFFICE VISIT (OUTPATIENT)
Dept: FAMILY MEDICINE | Facility: CLINIC | Age: 45
End: 2025-04-29
Payer: COMMERCIAL

## 2025-04-29 VITALS
BODY MASS INDEX: 26.6 KG/M2 | HEIGHT: 70 IN | HEART RATE: 78 BPM | RESPIRATION RATE: 16 BRPM | WEIGHT: 185.8 LBS | OXYGEN SATURATION: 100 % | SYSTOLIC BLOOD PRESSURE: 128 MMHG | TEMPERATURE: 97.9 F | DIASTOLIC BLOOD PRESSURE: 84 MMHG

## 2025-04-29 DIAGNOSIS — Z80.42 FAMILY HX OF PROSTATE CANCER: ICD-10-CM

## 2025-04-29 DIAGNOSIS — Z98.890 S/P LUMBAR DISCECTOMY: ICD-10-CM

## 2025-04-29 DIAGNOSIS — Z00.00 ROUTINE GENERAL MEDICAL EXAMINATION AT A HEALTH CARE FACILITY: Primary | ICD-10-CM

## 2025-04-29 DIAGNOSIS — M54.16 LUMBAR RADICULOPATHY: ICD-10-CM

## 2025-04-29 DIAGNOSIS — M54.50 CHRONIC BILATERAL LOW BACK PAIN WITHOUT SCIATICA: ICD-10-CM

## 2025-04-29 DIAGNOSIS — G89.29 CHRONIC BILATERAL LOW BACK PAIN WITHOUT SCIATICA: ICD-10-CM

## 2025-04-29 DIAGNOSIS — Z13.6 CARDIOVASCULAR SCREENING; LDL GOAL LESS THAN 160: ICD-10-CM

## 2025-04-29 PROCEDURE — 1125F AMNT PAIN NOTED PAIN PRSNT: CPT | Performed by: FAMILY MEDICINE

## 2025-04-29 PROCEDURE — G2211 COMPLEX E/M VISIT ADD ON: HCPCS | Performed by: FAMILY MEDICINE

## 2025-04-29 PROCEDURE — 91320 SARSCV2 VAC 30MCG TRS-SUC IM: CPT | Performed by: FAMILY MEDICINE

## 2025-04-29 PROCEDURE — 90471 IMMUNIZATION ADMIN: CPT | Performed by: FAMILY MEDICINE

## 2025-04-29 PROCEDURE — 3079F DIAST BP 80-89 MM HG: CPT | Performed by: FAMILY MEDICINE

## 2025-04-29 PROCEDURE — 3074F SYST BP LT 130 MM HG: CPT | Performed by: FAMILY MEDICINE

## 2025-04-29 PROCEDURE — 90480 ADMN SARSCOV2 VAC 1/ONLY CMP: CPT | Performed by: FAMILY MEDICINE

## 2025-04-29 PROCEDURE — 99396 PREV VISIT EST AGE 40-64: CPT | Mod: 25 | Performed by: FAMILY MEDICINE

## 2025-04-29 PROCEDURE — 90746 HEPB VACCINE 3 DOSE ADULT IM: CPT | Performed by: FAMILY MEDICINE

## 2025-04-29 PROCEDURE — 99213 OFFICE O/P EST LOW 20 MIN: CPT | Mod: 25 | Performed by: FAMILY MEDICINE

## 2025-04-29 RX ORDER — METHOCARBAMOL 750 MG/1
750 TABLET, FILM COATED ORAL 2 TIMES DAILY PRN
Qty: 60 TABLET | Refills: 5 | Status: SHIPPED | OUTPATIENT
Start: 2025-04-29

## 2025-04-29 RX ORDER — IBUPROFEN 600 MG/1
600 TABLET, FILM COATED ORAL EVERY 8 HOURS PRN
Qty: 90 TABLET | Refills: 1 | Status: SHIPPED | OUTPATIENT
Start: 2025-04-29

## 2025-04-29 SDOH — HEALTH STABILITY: PHYSICAL HEALTH: ON AVERAGE, HOW MANY DAYS PER WEEK DO YOU ENGAGE IN MODERATE TO STRENUOUS EXERCISE (LIKE A BRISK WALK)?: 2 DAYS

## 2025-04-29 ASSESSMENT — SOCIAL DETERMINANTS OF HEALTH (SDOH): HOW OFTEN DO YOU GET TOGETHER WITH FRIENDS OR RELATIVES?: ONCE A WEEK

## 2025-04-29 ASSESSMENT — PATIENT HEALTH QUESTIONNAIRE - PHQ9
10. IF YOU CHECKED OFF ANY PROBLEMS, HOW DIFFICULT HAVE THESE PROBLEMS MADE IT FOR YOU TO DO YOUR WORK, TAKE CARE OF THINGS AT HOME, OR GET ALONG WITH OTHER PEOPLE: NOT DIFFICULT AT ALL
SUM OF ALL RESPONSES TO PHQ QUESTIONS 1-9: 0
SUM OF ALL RESPONSES TO PHQ QUESTIONS 1-9: 0

## 2025-04-29 ASSESSMENT — ANXIETY QUESTIONNAIRES
7. FEELING AFRAID AS IF SOMETHING AWFUL MIGHT HAPPEN: NOT AT ALL
1. FEELING NERVOUS, ANXIOUS, OR ON EDGE: MORE THAN HALF THE DAYS
GAD7 TOTAL SCORE: 4
GAD7 TOTAL SCORE: 4
3. WORRYING TOO MUCH ABOUT DIFFERENT THINGS: MORE THAN HALF THE DAYS
5. BEING SO RESTLESS THAT IT IS HARD TO SIT STILL: NOT AT ALL
6. BECOMING EASILY ANNOYED OR IRRITABLE: NOT AT ALL
2. NOT BEING ABLE TO STOP OR CONTROL WORRYING: NOT AT ALL
7. FEELING AFRAID AS IF SOMETHING AWFUL MIGHT HAPPEN: NOT AT ALL
IF YOU CHECKED OFF ANY PROBLEMS ON THIS QUESTIONNAIRE, HOW DIFFICULT HAVE THESE PROBLEMS MADE IT FOR YOU TO DO YOUR WORK, TAKE CARE OF THINGS AT HOME, OR GET ALONG WITH OTHER PEOPLE: SOMEWHAT DIFFICULT
4. TROUBLE RELAXING: NOT AT ALL
GAD7 TOTAL SCORE: 4
8. IF YOU CHECKED OFF ANY PROBLEMS, HOW DIFFICULT HAVE THESE MADE IT FOR YOU TO DO YOUR WORK, TAKE CARE OF THINGS AT HOME, OR GET ALONG WITH OTHER PEOPLE?: SOMEWHAT DIFFICULT

## 2025-04-29 ASSESSMENT — PAIN SCALES - GENERAL: PAINLEVEL_OUTOF10: MODERATE PAIN (4)

## 2025-04-29 NOTE — NURSING NOTE
Prior to immunization administration, verified patients identity using patient s name and date of birth. Please see Immunization Activity for additional information.     Screening Questionnaire for Adult Immunization    Are you sick today?   No   Do you have allergies to medications, food, a vaccine component or latex?   No   Have you ever had a serious reaction after receiving a vaccination?   No   Do you have a long-term health problem with heart, lung, kidney, or metabolic disease (e.g., diabetes), asthma, a blood disorder, no spleen, complement component deficiency, a cochlear implant, or a spinal fluid leak?  Are you on long-term aspirin therapy?   No   Do you have cancer, leukemia, HIV/AIDS, or any other immune system problem?   No   Do you have a parent, brother, or sister with an immune system problem?   No   In the past 3 months, have you taken medications that affect  your immune system, such as prednisone, other steroids, or anticancer drugs; drugs for the treatment of rheumatoid arthritis, Crohn s disease, or psoriasis; or have you had radiation treatments?   No   Have you had a seizure, or a brain or other nervous system problem?   No   During the past year, have you received a transfusion of blood or blood    products, or been given immune (gamma) globulin or antiviral drug?   No   For women: Are you pregnant or is there a chance you could become       pregnant during the next month?   No   Have you received any vaccinations in the past 4 weeks?   No     Immunization questionnaire answers were all negative.        Patient instructed to remain in clinic for 15 minutes afterwards, and to report any adverse reactions.     Screening performed by Norris Bolanos MA on 4/29/2025 at 3:56 PM.

## 2025-04-29 NOTE — PATIENT INSTRUCTIONS
Patient Education   Preventive Care Advice   This is general advice given by our system to help you stay healthy. However, your care team may have specific advice just for you. Please talk to your care team about your preventive care needs.  Nutrition  Eat 5 or more servings of fruits and vegetables each day.  Try wheat bread, brown rice and whole grain pasta (instead of white bread, rice, and pasta).  Get enough calcium and vitamin D. Check the label on foods and aim for 100% of the RDA (recommended daily allowance).  Lifestyle  Exercise at least 150 minutes each week  (30 minutes a day, 5 days a week).  Do muscle strengthening activities 2 days a week. These help control your weight and prevent disease.  No smoking.  Wear sunscreen to prevent skin cancer.  Have a dental exam and cleaning every 6 months.  Yearly exams  See your health care team every year to talk about:  Any changes in your health.  Any medicines your care team has prescribed.  Preventive care, family planning, and ways to prevent chronic diseases.  Shots (vaccines)   HPV shots (up to age 26), if you've never had them before.  Hepatitis B shots (up to age 59), if you've never had them before.  COVID-19 shot: Get this shot when it's due.  Flu shot: Get a flu shot every year.  Tetanus shot: Get a tetanus shot every 10 years.  Pneumococcal, hepatitis A, and RSV shots: Ask your care team if you need these based on your risk.  Shingles shot (for age 50 and up)  General health tests  Diabetes screening:  Starting at age 35, Get screened for diabetes at least every 3 years.  If you are younger than age 35, ask your care team if you should be screened for diabetes.  Cholesterol test: At age 39, start having a cholesterol test every 5 years, or more often if advised.  Bone density scan (DEXA): At age 50, ask your care team if you should have this scan for osteoporosis (brittle bones).  Hepatitis C: Get tested at least once in your life.  STIs (sexually  transmitted infections)  Before age 24: Ask your care team if you should be screened for STIs.  After age 24: Get screened for STIs if you're at risk. You are at risk for STIs (including HIV) if:  You are sexually active with more than one person.  You don't use condoms every time.  You or a partner was diagnosed with a sexually transmitted infection.  If you are at risk for HIV, ask about PrEP medicine to prevent HIV.  Get tested for HIV at least once in your life, whether you are at risk for HIV or not.  Cancer screening tests  Cervical cancer screening: If you have a cervix, begin getting regular cervical cancer screening tests starting at age 21.  Breast cancer scan (mammogram): If you've ever had breasts, begin having regular mammograms starting at age 40. This is a scan to check for breast cancer.  Colon cancer screening: It is important to start screening for colon cancer at age 45.  Have a colonoscopy test every 10 years (or more often if you're at risk) Or, ask your provider about stool tests like a FIT test every year or Cologuard test every 3 years.  To learn more about your testing options, visit:   .  For help making a decision, visit:   https://bit.ly/la32947.  Prostate cancer screening test: If you have a prostate, ask your care team if a prostate cancer screening test (PSA) at age 55 is right for you.  Lung cancer screening: If you are a current or former smoker ages 50 to 80, ask your care team if ongoing lung cancer screenings are right for you.  For informational purposes only. Not to replace the advice of your health care provider. Copyright   2023 [employer]. All rights reserved. Clinically reviewed by the [facility]. VeliQ [id number] - REV 01/24.

## 2025-04-29 NOTE — PROGRESS NOTES
Preventive Care Visit  [facility]  Catherine Mercado MD, Family Medicine  Apr 29, 2025      Assessment & Plan     Routine general medical examination at a health care facility  Reviewed chronic issues and medications/supplements.   Discussed healthy habits, eye/dental care, healthcare maintenance issues, including cancer screenings (colonoscopies, PSA), relevant immunizations, and cardiac risk factor screenings such as for cholesterol, HTN, and DM.  See orders for tests and screening needed.    COVID19 and first Hep B immunizations needed today- Risks/benefits discussed, given today.   - COVID-19 12+ (PFIZER)  - HEPATITIS B, ADULT 20+ (ENGERIX-B/RECOMBIVAX HB)  - PSA, screen; Future    Lumbar radiculopathy  S/P lumbar discectomy  Chronic bilateral low back pain without sciatica  Acute on chronic low back pain, now with R sided sciatic sx's.  Seen by ortho, started on gabapentin 300mg 3x/day which he feels is helping some.  Also recently let go from his job (which he thinks may end up being a good thing- looking for a better fit), so back less aggravated lately.  Has been taking robaxin and ibuprofen for back pain sx's, alternating, never at the same time.  Risks and benefits of medication(s) including potential side effects reviewed with patient.  Questions answered.   - ibuprofen (ADVIL/MOTRIN) 600 MG tablet; Take 1 tablet (600 mg) by mouth every 8 hours as needed for moderate pain.  - methocarbamol (ROBAXIN) 750 MG tablet; Take 1 tablet (750 mg) by mouth 2 times daily as needed for muscle spasms.  - Comprehensive metabolic panel (BMP + Alb, Alk Phos, ALT, AST, Total. Bili, TP); Future    CARDIOVASCULAR SCREENING; LDL GOAL LESS THAN 160  RTC for fasting labs  - Lipid panel reflex to direct LDL Fasting; Future  - Comprehensive metabolic panel (BMP + Alb, Alk Phos, ALT, AST, Total. Bili, TP); Future    Family hx of prostate cancer  - PSA, screen; Future    Patient has been advised of split  "billing requirements and indicates understanding: Yes        Nicotine/Tobacco Cessation  He reports that he has been smoking cigars. He has never been exposed to tobacco smoke. He has never used smokeless tobacco.  Nicotine/Tobacco Cessation Plan  Information offered: Patient not interested at this time      BMI  Estimated body mass index is 26.47 kg/m  as calculated from the following:    Height as of this encounter: 1.784 m (5' 10.25\").    Weight as of this encounter: 84.3 kg (185 lb 12.8 oz).   Weight management plan: Discussed healthy diet and exercise guidelines    Counseling  Appropriate preventive services were addressed with this patient via screening, questionnaire, or discussion as appropriate for fall prevention, nutrition, physical activity, Tobacco-use cessation, social engagement, weight loss and cognition.  Checklist reviewing preventive services available has been given to the patient.  Reviewed patient's diet, addressing concerns and/or questions.   He is at risk for lack of exercise and has been provided with information to increase physical activity for the benefit of his well-being.       Follow-up    Follow-up Visit   Expected date:  Apr 29, 2026 (Approximate)      Follow Up Appointment Details:     Follow-up with whom?: PCP    Follow-Up for what?: Adult Preventive    How?: In Person                     Tran Hilton is a 44 year old, presenting for the following:  Physical (Pt is not fasting/)        4/29/2025     2:59 PM   Additional Questions   Roomed by Milla GUERRERO   Accompanied by self          HPI    Saw Ortho- mid/low back, and radiation down right leg.  Started him on gabapentin.  Helping some.  Now at 300mg 3x/day.  No se's.  On it for about a month.  Considering injection- needs to get it scheduled.  Got busy, was working long hours.  [employer] out in [place]- laid off.  Will get unemployment- looking for a new job.      Robaxin - few times a week.  Ibuprofen- few times a " week.  Hopefully less with not working working.      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  After he takes it off.  Hurts his mid/low back.  Wearing the extra 35 lbs.  He's lightened his load- 1 pouch and his hammer now.       Advance Care Planning    Discussed advance care planning with patient; informed AVS has link to [employer].        4/29/2025   General Health   How would you rate your overall physical health? Good   Feel stress (tense, anxious, or unable to sleep) Only a little   (!) STRESS CONCERN      4/29/2025   Nutrition   Three or more servings of calcium each day? Yes   Diet: Regular (no restrictions)   How many servings of fruit and vegetables per day? (!) 2-3   How many sweetened beverages each day? (!) 2         4/29/2025   Exercise   Days per week of moderate/strenous exercise 2 days   (!) EXERCISE CONCERN      4/29/2025   Social Factors   Frequency of gathering with friends or relatives Once a week   Worry food won't last until get money to buy more No   Food not last or not have enough money for food? No   Do you have housing? (Housing is defined as stable permanent housing and does not include staying outside in a car, in a tent, in an abandoned building, in an overnight shelter, or couch-surfing.) Yes   Are you worried about losing your housing? No   Lack of transportation? No   Unable to get utilities (heat,electricity)? No         4/29/2025   Dental   Dentist two times every year? Yes       Today's PHQ-9 Score:       4/29/2025     2:48 PM   PHQ-9 SCORE   PHQ-9 Total Score MyChart 0   PHQ-9 Total Score 0        Patient-reported         4/29/2025   Substance Use   Alcohol more than 3/day or more than 7/wk No   Do you use any other substances recreationally? No     Social History     Tobacco Use    Smoking status: Some Days     Types: Cigars     Passive exposure: Never    Smokeless tobacco: Never    Tobacco comments:     socially   Vaping Use    Vaping status: Never Used    Substance Use Topics    Alcohol use: Yes     Alcohol/week: 0.0 standard drinks of alcohol     Comment: socially    Drug use: No           4/29/2025   STI Screening   New sexual partner(s) since last STI/HIV test? No   ASCVD Risk   The 10-year ASCVD risk score (Cydney HERNANDEZ, et al., 2019) is: 6.5%    Values used to calculate the score:      Age: 44 years      Sex: Male      Is Non- : Yes      Diabetic: No      Tobacco smoker: Yes      Systolic Blood Pressure: 128 mmHg      Is BP treated: No      HDL Cholesterol: 41 mg/dL      Total Cholesterol: 151 mg/dL        4/29/2025   Contraception/Family Planning   Questions about contraception or family planning No        Reviewed and updated as needed this visit by Provider   Tobacco  Allergies  Meds  Problems  Med Hx  Surg Hx  Fam Hx            Lab work is in process  Labs reviewed in EPIC  BP Readings from Last 3 Encounters:   04/29/25 128/84   01/26/24 133/80   08/15/23 137/66    Wt Readings from Last 3 Encounters:   04/29/25 84.3 kg (185 lb 12.8 oz)   01/26/24 81.7 kg (180 lb 3.2 oz)   08/15/23 82.9 kg (182 lb 11.2 oz)                  Recent Labs   Lab Test 01/26/24  0823 02/04/23  1101 02/03/23  0952 01/25/22  1455 10/20/20  1212 07/27/17  1554   LDL  --   --   --   --  132* 108*   HDL 41  --   --   --  32* 38*   TRIG  --   --   --   --  169* 159*   ALT 33 20 26   < >  --   --    CR 0.87 0.95 1.03   < >  --   --    GFRESTIMATED >90 >90 >90   < >  --   --    POTASSIUM 4.2 3.6 4.2   < >  --   --     < > = values in this interval not displayed.          Review of Systems  Constitutional, neuro, ENT, endocrine, pulmonary, cardiac, gastrointestinal, genitourinary, musculoskeletal, integument and psychiatric systems are negative, except as otherwise noted.     Objective    Exam  /84 (BP Location: Right arm, Patient Position: Sitting, Cuff Size: Adult Regular)   Pulse 78   Temp 97.9  F (36.6  C) (Temporal)   Resp 16   Ht 1.784 m  "(5' 10.25\")   Wt 84.3 kg (185 lb 12.8 oz)   SpO2 100%   BMI 26.47 kg/m     Estimated body mass index is 26.47 kg/m  as calculated from the following:    Height as of this encounter: 1.784 m (5' 10.25\").    Weight as of this encounter: 84.3 kg (185 lb 12.8 oz).    Physical Exam  GENERAL: alert and no distress  EYES: Eyes grossly normal to inspection, PERRL and conjunctivae and sclerae normal  HENT: ear canals and TM's normal, nose and mouth without ulcers or lesions  NECK: no adenopathy, no asymmetry, masses, or scars  RESP: lungs clear to auscultation - no rales, rhonchi or wheezes  CV: regular rate and rhythm, normal S1 S2, no S3 or S4, no murmur, click or rub, no peripheral edema  ABDOMEN: soft, nontender, no hepatosplenomegaly, no masses and bowel sounds normal  MS: no gross musculoskeletal defects noted, no edema  SKIN: no suspicious lesions or rashes  NEURO: Normal strength and tone, mentation intact and speech normal  PSYCH: mentation appears normal, affect normal/bright        Signed Electronically by: Catherine Mercado MD    Answers submitted by the patient for this visit:  Patient Health Questionnaire (Submitted on 4/29/2025)  If you checked off any problems, how difficult have these problems made it for you to do your work, take care of things at home, or get along with other people?: Not difficult at all  PHQ9 TOTAL SCORE: 0  Patient Health Questionnaire (G7) (Submitted on 4/29/2025)  JUAN LUIS 7 TOTAL SCORE: 4    "

## 2025-05-06 ENCOUNTER — TELEPHONE (OUTPATIENT)
Dept: ANESTHESIOLOGY | Facility: CLINIC | Age: 45
End: 2025-05-06
Payer: COMMERCIAL

## 2025-05-06 NOTE — TELEPHONE ENCOUNTER
RN reviewed patient chart. Pre procedure instructions were sent to the patient.    Destiny Moreno RNCC

## 2025-05-06 NOTE — TELEPHONE ENCOUNTER
Called patient to schedule procedure with Dr. Martinez    Date of Procedure: 5/9/25    Arrival time given: Yes: Arrival Time 12pm       Procedure Location: [facility]     Verified Location with Patient:  Yes  Address provided to the patient     Pre-op H&P Required:  No: Local anesthesia        Post-Op/Follow Up Appt:  Not Indicated in Request      Informed patient they will need a  to drive them home:  Yes    Patients : Spouse    Patient is aware that pre-op RN from the procedure center will call 2-3 days prior to scheduled procedure to confirm arrival time and review any instructions:  Yes       Additional Comments: N/A        Marisol Foreman MA on 5/6/2025 at 1:46 PM      P: [phone]

## 2025-05-07 ENCOUNTER — LAB (OUTPATIENT)
Dept: LAB | Facility: CLINIC | Age: 45
End: 2025-05-07
Payer: COMMERCIAL

## 2025-05-07 DIAGNOSIS — M54.50 CHRONIC BILATERAL LOW BACK PAIN WITHOUT SCIATICA: ICD-10-CM

## 2025-05-07 DIAGNOSIS — G89.29 CHRONIC BILATERAL LOW BACK PAIN WITHOUT SCIATICA: ICD-10-CM

## 2025-05-07 DIAGNOSIS — Z13.6 CARDIOVASCULAR SCREENING; LDL GOAL LESS THAN 160: ICD-10-CM

## 2025-05-07 DIAGNOSIS — Z00.00 ROUTINE GENERAL MEDICAL EXAMINATION AT A HEALTH CARE FACILITY: ICD-10-CM

## 2025-05-07 DIAGNOSIS — Z80.42 FAMILY HX OF PROSTATE CANCER: ICD-10-CM

## 2025-05-07 LAB
ALBUMIN SERPL BCG-MCNC: 4.5 G/DL (ref 3.5–5.2)
ALP SERPL-CCNC: 108 U/L (ref 40–150)
ALT SERPL W P-5'-P-CCNC: 57 U/L (ref 0–70)
ANION GAP SERPL CALCULATED.3IONS-SCNC: 9 MMOL/L (ref 7–15)
AST SERPL W P-5'-P-CCNC: 40 U/L (ref 0–45)
BILIRUB SERPL-MCNC: 0.5 MG/DL
BUN SERPL-MCNC: 11.7 MG/DL (ref 6–20)
CALCIUM SERPL-MCNC: 9.2 MG/DL (ref 8.8–10.4)
CHLORIDE SERPL-SCNC: 105 MMOL/L (ref 98–107)
CHOLEST SERPL-MCNC: 229 MG/DL
CREAT SERPL-MCNC: 1.02 MG/DL (ref 0.67–1.17)
EGFRCR SERPLBLD CKD-EPI 2021: >90 ML/MIN/1.73M2
FASTING STATUS PATIENT QL REPORTED: YES
FASTING STATUS PATIENT QL REPORTED: YES
GLUCOSE SERPL-MCNC: 88 MG/DL (ref 70–99)
HCO3 SERPL-SCNC: 26 MMOL/L (ref 22–29)
HDLC SERPL-MCNC: 35 MG/DL
LDLC SERPL CALC-MCNC: 165 MG/DL
NONHDLC SERPL-MCNC: 194 MG/DL
POTASSIUM SERPL-SCNC: 4.4 MMOL/L (ref 3.4–5.3)
PROT SERPL-MCNC: 7.5 G/DL (ref 6.4–8.3)
PSA SERPL DL<=0.01 NG/ML-MCNC: 1.28 NG/ML (ref 0–2.5)
SODIUM SERPL-SCNC: 140 MMOL/L (ref 135–145)
TRIGL SERPL-MCNC: 145 MG/DL

## 2025-05-07 PROCEDURE — 80053 COMPREHEN METABOLIC PANEL: CPT

## 2025-05-07 PROCEDURE — 36415 COLL VENOUS BLD VENIPUNCTURE: CPT

## 2025-05-07 PROCEDURE — 80061 LIPID PANEL: CPT

## 2025-05-07 PROCEDURE — G0103 PSA SCREENING: HCPCS

## 2025-05-08 ENCOUNTER — RESULTS FOLLOW-UP (OUTPATIENT)
Dept: FAMILY MEDICINE | Facility: CLINIC | Age: 45
End: 2025-05-08

## 2025-05-08 NOTE — RESULT ENCOUNTER NOTE
-Your comprehensive metabolic panel (CMP, which includes electrolyte levels, blood sugar levels, and kidney and liver function tests) looks good/normal.  -Your PSA (prostate cancer screening lab) was in the normal range which is good to see.   -Your cholesterol panel looks worse with a higher LDL (the bad cholesterol), up from 108 in 2017, 132 in 2020, and now at 165.  The LDL goal is <130, and the LDL is often improved by lowering your intake of saturated fats (and getting more vegetables and legumes in your diet).  Your HDL (the good cholesterol), is also lower than we like to see, and this is often increased with more aerobic exercise.    Please let me know if you have any questions.  Best,   Han Mercado MD

## 2025-05-09 ENCOUNTER — ANCILLARY PROCEDURE (OUTPATIENT)
Dept: RADIOLOGY | Facility: AMBULATORY SURGERY CENTER | Age: 45
End: 2025-05-09
Attending: ANESTHESIOLOGY
Payer: COMMERCIAL

## 2025-05-09 ENCOUNTER — HOSPITAL ENCOUNTER (OUTPATIENT)
Facility: AMBULATORY SURGERY CENTER | Age: 45
Discharge: HOME | End: 2025-05-09
Attending: ANESTHESIOLOGY | Admitting: ANESTHESIOLOGY
Payer: COMMERCIAL

## 2025-05-09 VITALS
HEIGHT: 70 IN | BODY MASS INDEX: 26.48 KG/M2 | DIASTOLIC BLOOD PRESSURE: 100 MMHG | TEMPERATURE: 97.4 F | HEART RATE: 70 BPM | OXYGEN SATURATION: 98 % | SYSTOLIC BLOOD PRESSURE: 141 MMHG | WEIGHT: 185 LBS | RESPIRATION RATE: 16 BRPM

## 2025-05-09 DIAGNOSIS — M54.16 LUMBAR RADICULOPATHY: ICD-10-CM

## 2025-05-09 PROCEDURE — 64483 NJX AA&/STRD TFRM EPI L/S 1: CPT | Mod: RT

## 2025-05-09 RX ORDER — LIDOCAINE HYDROCHLORIDE 10 MG/ML
INJECTION, SOLUTION EPIDURAL; INFILTRATION; INTRACAUDAL; PERINEURAL PRN
Status: DISCONTINUED | OUTPATIENT
Start: 2025-05-09 | End: 2025-05-09 | Stop reason: HOSPADM

## 2025-05-09 RX ORDER — DIAZEPAM 5 MG/1
5-10 TABLET ORAL
Status: DISCONTINUED | OUTPATIENT
Start: 2025-05-09 | End: 2025-05-10 | Stop reason: HOSPADM

## 2025-05-09 RX ORDER — DEXAMETHASONE SODIUM PHOSPHATE 10 MG/ML
INJECTION, SOLUTION INTRAMUSCULAR; INTRAVENOUS PRN
Status: DISCONTINUED | OUTPATIENT
Start: 2025-05-09 | End: 2025-05-09 | Stop reason: HOSPADM

## 2025-05-09 RX ORDER — IOPAMIDOL 408 MG/ML
INJECTION, SOLUTION INTRATHECAL PRN
Status: DISCONTINUED | OUTPATIENT
Start: 2025-05-09 | End: 2025-05-09 | Stop reason: HOSPADM

## 2025-05-09 NOTE — DISCHARGE INSTRUCTIONS
Home Care Instructions after an Epidural Steroid Pain Injection    A lumbar epidural steroid injection delivers steroid medication directly into the area that may be causing your lower back pain and/or leg pain. A cervical or thoracic epidural steroid injection delivers steroids into the epidural space surrounding spinal nerve roots to help relieve pain in the upper spine/neck.    Activity  -Rest today  -Do not work today  -Resume normal activity tomorrow  -DO NOT shower for 24 hours  -DO NOT remove bandaid for 24 hours    Pain  -You may experience soreness at the injection site for one or two days  -You may use an ice pack for 20 minutes every 2 hours for the first 24 hours  -You may use a heating pad after the first 24 hours  -You may use Tylenol (acetaminophen) every 4 hours or other pain medicines as     directed by your physician    You may experience numbness radiating into your legs or arms (depending on the procedure location). This numbness may last several hours. Until sensation returns to normal; please use caution in walking, climbing stairs, and stepping out of your vehicle, etc.    Common side effects of steroids:  Not everyone will experience corticosteroid side effects. If side effects are experienced, they will gradually subside in the 7-10 day period following an injection. Most common side effects include:  -Flushed face and/or chest  -Feeling of warmth, particularly in the face but could be an overall feeling of warmth  -Increased blood sugar in diabetic patients  -Menstrual irregularities my occur. If taking hormone-based birth control an alternate method of birth control is recommended  -Sleep disturbances and/or mood swings are possible  -Leg cramps    Please contact us if you have:  -Severe pain  -Fever more than 101.5 degrees Fahrenheit  -Signs of infection at the injection site (redness, swelling, or drainage)    FOR PAIN CENTER PATIENTS:  If you have questions, please contact the Pain  Clinic at [phone] Option #1 between the hours of 7:00 am and 3:00 pm Monday through Friday. After office hours you can contact the on call provider by dialing [phone]. If you need immediate attention, we recommend that you go to a hospital emergency room or dial [phone].

## 2025-05-09 NOTE — OP NOTE
Patient: Yobani Baldwin Age: 44 year old   MRN: [id number] Attending: Dr. Martinez     Date of Visit: May 9, 2025      PAIN MEDICINE CLINIC PROCEDURE NOTE    ATTENDING CLINICIAN:    Rene Martinez MD    ASSISTANT CLINICIAN:  Chi Piznao MD     PREPROCEDURE DIAGNOSES:  1.  Lumbosacral radiculopathy        PROCEDURE(S) PERFORMED:  1.  Right L4-L5  transforaminal epidural injection.    2.  Fluoroscopic guidance for the above-named procedure(s).      ANESTHESIA:  Local. See nursing notes for pre and post procedure vitals    BLOOD LOSS:  Minimal.    DRAINS AND SPECIMENS:  None.    COMPLICATIONS:  None.    INDICATIONS:  Yobani Baldwin is a 44 year old male with a history of low back pain with radicular symptoms to the lower extremity.  The patient stated that the patient was in their usual state of health and denied recent anticoagulant use or recent infections.  Therefore, the plan is to perform above mentioned procedure.     Procedure Details:  The patient was met in the procedure room, where the patient was identified by name, medical record number and date of birth.  All of the patient s last minute questions were answered. Written informed consent was obtained and saved in the electronic medical record, after the risks, benefits, and alternatives were discussed with the patient.      A formal time-out procedure was performed, as per protocol, including patient name, title of procedure, and site of procedure, and all in the room concurred.  Routine monitors were applied.      The patient was placed in the prone position on the procedure room table.  All pressure points were checked and comfortably padded.  Routine monitors were placed.  Vital signs were stable.    A chlorhexidine prep was completed followed by sterile draping per standard procedure.     The AP fluoroscopic view was optimized for approach at right L4-5 neural foramina. Targeting the 6 o'clock position of the pedicle, skin, and subcutaneous  tissue overlying the region was anesthetized with 1% lidocaine using a 25-gauge 1-1/2 inch needle.  Then a 22-gauge 3-1/2 inch spinal needle was advanced through the anesthetized tract. Under serial fluoroscopic images, the needle are intermittently advanced until osseous contact was made at the 6 o'clock position of the pedicle.  The needle was then walked off in an inferior direction.  Using lateral fluoroscopic imaging, needle tip position was confirmed at the superior portion of the neural foramen.  Then after negative aspiration, 0.5 mL of the Omnipaque contrast was injected, confirming appropriate epidural and nerve root spread.  After negative aspiration, 2 mL of treatment solution containing 2 mL of bupivacaine 0.25% was injected.-Before the procedure scheduled around the morning at  Light pressure was held at the puncture site(s) to prevent ecchymosis and oozing.  The patient's skin was cleansed, and hemostasis was confirmed.  Band-aids were applied to the needle injection site(s).      Condition:    The patient remained awake and alert throughout the procedure.  The patient tolerated the procedure well and was monitored for approximately 15 minutes afterward in the post procedure area.  There were no immediate post procedure complications noted.  The patient was then discharged to home as per protocol.  The patient will follow up in the outpatient clinic in 4 week(s), unless otherwise clinically indicated.      Pre-procedure pain score: 4/10  Post-procedure pain score: 0/10
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
## 2025-08-15 ENCOUNTER — OFFICE VISIT (OUTPATIENT)
Dept: FAMILY MEDICINE | Facility: CLINIC | Age: 45
End: 2025-08-15
Payer: COMMERCIAL

## 2025-08-15 VITALS
HEIGHT: 70 IN | HEART RATE: 79 BPM | WEIGHT: 182 LBS | DIASTOLIC BLOOD PRESSURE: 77 MMHG | RESPIRATION RATE: 16 BRPM | TEMPERATURE: 97.5 F | BODY MASS INDEX: 26.05 KG/M2 | SYSTOLIC BLOOD PRESSURE: 130 MMHG | OXYGEN SATURATION: 99 %

## 2025-08-15 DIAGNOSIS — S43.62XA STERNOCLAVICULAR SPRAIN, LEFT, INITIAL ENCOUNTER: Primary | ICD-10-CM

## 2025-08-15 PROCEDURE — 1126F AMNT PAIN NOTED NONE PRSNT: CPT | Performed by: FAMILY MEDICINE

## 2025-08-15 PROCEDURE — 3075F SYST BP GE 130 - 139MM HG: CPT | Performed by: FAMILY MEDICINE

## 2025-08-15 PROCEDURE — 99213 OFFICE O/P EST LOW 20 MIN: CPT | Performed by: FAMILY MEDICINE

## 2025-08-15 PROCEDURE — 3078F DIAST BP <80 MM HG: CPT | Performed by: FAMILY MEDICINE

## 2025-08-15 ASSESSMENT — ANXIETY QUESTIONNAIRES
4. TROUBLE RELAXING: NOT AT ALL
GAD7 TOTAL SCORE: 8
2. NOT BEING ABLE TO STOP OR CONTROL WORRYING: MORE THAN HALF THE DAYS
6. BECOMING EASILY ANNOYED OR IRRITABLE: NOT AT ALL
7. FEELING AFRAID AS IF SOMETHING AWFUL MIGHT HAPPEN: MORE THAN HALF THE DAYS
3. WORRYING TOO MUCH ABOUT DIFFERENT THINGS: MORE THAN HALF THE DAYS
5. BEING SO RESTLESS THAT IT IS HARD TO SIT STILL: NOT AT ALL
1. FEELING NERVOUS, ANXIOUS, OR ON EDGE: MORE THAN HALF THE DAYS
7. FEELING AFRAID AS IF SOMETHING AWFUL MIGHT HAPPEN: MORE THAN HALF THE DAYS

## 2025-08-15 ASSESSMENT — PAIN SCALES - GENERAL: PAINLEVEL_OUTOF10: NO PAIN (0)

## (undated) DEVICE — LINEN TOWEL PACK X5 5464

## (undated) DEVICE — SU MONOCRYL 3-0 PS-2 18" UND Y497G

## (undated) DEVICE — DRSG TEGADERM 4X10" 1627

## (undated) DEVICE — GLOVE BIOGEL PI ULTRATOUCH SZ 7.0 41170

## (undated) DEVICE — TOOL DISSECT MIDAS MR8 14CM MATCH HEAD 3MM MR8-14MH30

## (undated) DEVICE — GLOVE BIOGEL PI MICRO SZ 7.5 48575

## (undated) DEVICE — NDL SPINAL 22GA 3.5" QUINCKE 405181

## (undated) DEVICE — DRAIN ROUND W/RESERV KIT JACKSON PRATT 10FR 400ML SU130-402D

## (undated) DEVICE — NDL SPINAL 18GA 3.5" 405184

## (undated) DEVICE — DECANTER TRANSFER DEVICE 2008S

## (undated) DEVICE — SUCTION MANIFOLD NEPTUNE 2 SYS 4 PORT 0702-020-000

## (undated) DEVICE — ADH SKIN CLOSURE PREMIERPRO EXOFIN 1.0ML 3470

## (undated) DEVICE — SU VICRYL 1 CT-1 CR 8X18" J741D

## (undated) DEVICE — POSITIONER ARMBOARD FOAM 1PAIR LF FP-ARMB1

## (undated) DEVICE — TRAY PAIN INJECTION 97A 640

## (undated) DEVICE — DRSG TEGADERM 4X4 3/4" 1626W

## (undated) DEVICE — LINEN BACK PACK 5440

## (undated) DEVICE — SUCTION MINISQUAIR SMOKE EVAC CAPTURE DEVICE SQ20012-01

## (undated) DEVICE — SU ETHILON 3-0 PS-1 18" 1663H

## (undated) DEVICE — SU VICRYL 2-0 CT-2 8X18" UND D8144

## (undated) DEVICE — SOL WATER IRRIG 1000ML BOTTLE 2F7114

## (undated) DEVICE — SYR 50ML LL W/O NDL 309653

## (undated) DEVICE — DRSG GAUZE 4X8" NON21842

## (undated) DEVICE — SOL NACL 0.9% IRRIG 1000ML BOTTLE 2F7124

## (undated) DEVICE — SOL ISOPROPYL RUBBING ALCOHOL USP 70% 4OZ HDX-20 I0020

## (undated) DEVICE — GLOVE BIOGEL PI MICRO INDICATOR UNDERGLOVE SZ 8.0 48980

## (undated) DEVICE — Device

## (undated) DEVICE — PREP CHLORAPREP W/ORANGE TINT 10.5ML 260715

## (undated) RX ORDER — METHOCARBAMOL 750 MG/1
TABLET, FILM COATED ORAL
Status: DISPENSED
Start: 2023-02-02

## (undated) RX ORDER — FENTANYL CITRATE 50 UG/ML
INJECTION, SOLUTION INTRAMUSCULAR; INTRAVENOUS
Status: DISPENSED
Start: 2023-02-02

## (undated) RX ORDER — BUPIVACAINE HYDROCHLORIDE 2.5 MG/ML
INJECTION, SOLUTION EPIDURAL; INFILTRATION; INTRACAUDAL
Status: DISPENSED
Start: 2023-02-02

## (undated) RX ORDER — HYDROMORPHONE HYDROCHLORIDE 1 MG/ML
INJECTION, SOLUTION INTRAMUSCULAR; INTRAVENOUS; SUBCUTANEOUS
Status: DISPENSED
Start: 2023-02-02

## (undated) RX ORDER — BUPIVACAINE HYDROCHLORIDE AND EPINEPHRINE 2.5; 5 MG/ML; UG/ML
INJECTION, SOLUTION EPIDURAL; INFILTRATION; INTRACAUDAL; PERINEURAL
Status: DISPENSED
Start: 2023-02-02

## (undated) RX ORDER — DIAZEPAM 10 MG/2ML
INJECTION, SOLUTION INTRAMUSCULAR; INTRAVENOUS
Status: DISPENSED
Start: 2023-02-02

## (undated) RX ORDER — VANCOMYCIN HYDROCHLORIDE 500 MG/10ML
INJECTION, POWDER, LYOPHILIZED, FOR SOLUTION INTRAVENOUS
Status: DISPENSED
Start: 2023-02-02

## (undated) RX ORDER — ONDANSETRON 2 MG/ML
INJECTION INTRAMUSCULAR; INTRAVENOUS
Status: DISPENSED
Start: 2023-02-02

## (undated) RX ORDER — ACETAMINOPHEN 325 MG/1
TABLET ORAL
Status: DISPENSED
Start: 2023-02-02

## (undated) RX ORDER — VANCOMYCIN HYDROCHLORIDE 1 G/20ML
INJECTION, POWDER, LYOPHILIZED, FOR SOLUTION INTRAVENOUS
Status: DISPENSED
Start: 2023-02-02

## (undated) RX ORDER — DEXAMETHASONE SODIUM PHOSPHATE 4 MG/ML
INJECTION, SOLUTION INTRA-ARTICULAR; INTRALESIONAL; INTRAMUSCULAR; INTRAVENOUS; SOFT TISSUE
Status: DISPENSED
Start: 2023-02-02

## (undated) RX ORDER — FENTANYL CITRATE 50 UG/ML
INJECTION, SOLUTION INTRAMUSCULAR; INTRAVENOUS
Status: DISPENSED
Start: 2022-02-09

## (undated) RX ORDER — OXYCODONE HYDROCHLORIDE 5 MG/1
TABLET ORAL
Status: DISPENSED
Start: 2023-02-02

## (undated) RX ORDER — CEFAZOLIN SODIUM/WATER 2 G/20 ML
SYRINGE (ML) INTRAVENOUS
Status: DISPENSED
Start: 2023-02-02